# Patient Record
Sex: MALE | Race: WHITE | NOT HISPANIC OR LATINO | Employment: FULL TIME | URBAN - NONMETROPOLITAN AREA
[De-identification: names, ages, dates, MRNs, and addresses within clinical notes are randomized per-mention and may not be internally consistent; named-entity substitution may affect disease eponyms.]

---

## 2023-02-26 ENCOUNTER — OFFICE VISIT (OUTPATIENT)
Dept: URGENT CARE | Facility: PHYSICIAN GROUP | Age: 48
End: 2023-02-26
Payer: COMMERCIAL

## 2023-02-26 VITALS
OXYGEN SATURATION: 98 % | TEMPERATURE: 98.3 F | RESPIRATION RATE: 16 BRPM | SYSTOLIC BLOOD PRESSURE: 124 MMHG | WEIGHT: 271 LBS | HEART RATE: 114 BPM | DIASTOLIC BLOOD PRESSURE: 76 MMHG

## 2023-02-26 DIAGNOSIS — J44.1 COPD WITH ACUTE EXACERBATION (HCC): ICD-10-CM

## 2023-02-26 PROCEDURE — 99204 OFFICE O/P NEW MOD 45 MIN: CPT | Performed by: PHYSICIAN ASSISTANT

## 2023-02-26 RX ORDER — BENZONATATE 200 MG/1
200 CAPSULE ORAL 3 TIMES DAILY PRN
Qty: 30 CAPSULE | Refills: 0 | Status: SHIPPED | OUTPATIENT
Start: 2023-02-26 | End: 2023-07-17

## 2023-02-26 RX ORDER — ALBUTEROL SULFATE 90 UG/1
AEROSOL, METERED RESPIRATORY (INHALATION)
COMMUNITY
Start: 2021-08-22 | End: 2023-07-17 | Stop reason: SDUPTHER

## 2023-02-26 RX ORDER — PREDNISONE 5 MG/1
TABLET ORAL
COMMUNITY
Start: 2021-12-10 | End: 2023-02-26

## 2023-02-26 RX ORDER — ALBUTEROL SULFATE 2.5 MG/3ML
2.5 SOLUTION RESPIRATORY (INHALATION)
COMMUNITY
Start: 2022-07-11 | End: 2024-03-04

## 2023-02-26 RX ORDER — FLUTICASONE PROPIONATE AND SALMETEROL 500; 50 UG/1; UG/1
1 POWDER RESPIRATORY (INHALATION)
COMMUNITY
Start: 2021-06-09 | End: 2023-02-26

## 2023-02-26 RX ORDER — KETOCONAZOLE 20 MG/ML
SHAMPOO TOPICAL
COMMUNITY
Start: 2022-04-11 | End: 2023-02-26

## 2023-02-26 RX ORDER — DOXYCYCLINE HYCLATE 100 MG
100 TABLET ORAL 2 TIMES DAILY
Qty: 14 TABLET | Refills: 0 | Status: SHIPPED | OUTPATIENT
Start: 2023-02-26 | End: 2023-03-05

## 2023-02-26 RX ORDER — TIOTROPIUM BROMIDE INHALATION SPRAY 3.12 UG/1
2 SPRAY, METERED RESPIRATORY (INHALATION) DAILY
COMMUNITY
Start: 2021-04-23 | End: 2023-02-26

## 2023-02-26 RX ORDER — METHYLPREDNISOLONE 4 MG/1
TABLET ORAL
Qty: 21 TABLET | Refills: 0 | Status: SHIPPED | OUTPATIENT
Start: 2023-02-26 | End: 2023-07-17

## 2023-02-26 RX ORDER — MONTELUKAST SODIUM 10 MG/1
1 TABLET ORAL EVERY EVENING
COMMUNITY
Start: 2021-06-11 | End: 2023-02-26

## 2023-02-26 RX ORDER — FLUCONAZOLE 200 MG/1
TABLET ORAL
COMMUNITY
Start: 2022-04-11 | End: 2023-02-26

## 2023-02-26 ASSESSMENT — ENCOUNTER SYMPTOMS
MYALGIAS: 0
VOMITING: 0
COUGH: 1
CHILLS: 0
HEADACHES: 0
HEMOPTYSIS: 0
DIARRHEA: 0
RHINORRHEA: 1
FEVER: 0
WHEEZING: 1
SORE THROAT: 0
ABDOMINAL PAIN: 0
SWEATS: 0
SPUTUM PRODUCTION: 1
NAUSEA: 0
SHORTNESS OF BREATH: 1

## 2023-02-26 ASSESSMENT — COPD QUESTIONNAIRES: COPD: 1

## 2023-02-27 NOTE — PROGRESS NOTES
Subjective     Luis Carlos Singh is a 47 y.o. male who presents with Congestion and Cough (X1week )            Cough  This is a new problem. Episode onset: 1 week. The problem has been gradually worsening. The problem occurs constantly. The cough is Productive of purulent sputum. Associated symptoms include nasal congestion, rhinorrhea, shortness of breath and wheezing. Pertinent negatives include no chest pain, chills, ear congestion, ear pain, fever, headaches, hemoptysis, myalgias, postnasal drip, rash, sore throat or sweats. The symptoms are aggravated by lying down. He has tried OTC cough suppressant for the symptoms. The treatment provided no relief. His past medical history is significant for COPD.       No past medical history on file.      No past surgical history on file.      No family history on file.    Allergies:  Penicillins      Medications, Allergies, and current problem list reviewed today in Epic    Review of Systems   Constitutional:  Positive for malaise/fatigue. Negative for chills and fever.   HENT:  Positive for congestion and rhinorrhea. Negative for ear discharge, ear pain, postnasal drip and sore throat.    Respiratory:  Positive for cough, sputum production, shortness of breath and wheezing. Negative for hemoptysis.    Cardiovascular:  Negative for chest pain.   Gastrointestinal:  Negative for abdominal pain, diarrhea, nausea and vomiting.   Musculoskeletal:  Negative for myalgias.   Skin:  Negative for rash.   Neurological:  Negative for headaches.        All other systems reviewed and are negative.       Objective     /76   Pulse (!) 114   Temp 36.8 °C (98.3 °F) (Temporal)   Resp 16   Wt 123 kg (271 lb)   SpO2 98%      Physical Exam  Constitutional:       General: He is not in acute distress.     Appearance: He is not ill-appearing.   HENT:      Head: Normocephalic and atraumatic.      Nose: Congestion present.      Mouth/Throat:      Mouth: Mucous membranes are moist.       Pharynx: No posterior oropharyngeal erythema.   Eyes:      Conjunctiva/sclera: Conjunctivae normal.   Cardiovascular:      Rate and Rhythm: Regular rhythm. Tachycardia present.      Heart sounds: Normal heart sounds.   Pulmonary:      Effort: Pulmonary effort is normal. No respiratory distress.      Breath sounds: No stridor. Wheezing and rhonchi present. No rales.      Comments: Diffuse mild wheezes and rhonchi  Skin:     General: Skin is warm and dry.   Neurological:      General: No focal deficit present.      Mental Status: He is alert and oriented to person, place, and time.   Psychiatric:         Mood and Affect: Mood normal.         Behavior: Behavior normal.         Thought Content: Thought content normal.         Judgment: Judgment normal.                           Assessment & Plan        1. COPD with acute exacerbation (HCC)    - doxycycline (VIBRAMYCIN) 100 MG Tab; Take 1 Tablet by mouth 2 times a day for 7 days.  Dispense: 14 Tablet; Refill: 0  - methylPREDNISolone (MEDROL DOSEPAK) 4 MG Tablet Therapy Pack; Follow schedule on package instructions.  Dispense: 21 Tablet; Refill: 0  - benzonatate (TESSALON) 200 MG capsule; Take 1 Capsule by mouth 3 times a day as needed for Cough.  Dispense: 30 Capsule; Refill: 0     Differential diagnoses, Supportive care, and indications for immediate follow-up discussed with patient.   Pathogenesis of diagnosis discussed including typical length and natural progression.   Instructed to return to clinic or nearest emergency department for any change in condition, further concerns, or worsening of symptoms.      The patient demonstrated a good understanding and agreed with the treatment plan.    Nickie Sanabria P.A.-C.

## 2023-07-17 ENCOUNTER — OFFICE VISIT (OUTPATIENT)
Dept: MEDICAL GROUP | Facility: CLINIC | Age: 48
End: 2023-07-17
Payer: COMMERCIAL

## 2023-07-17 VITALS
BODY MASS INDEX: 37.94 KG/M2 | OXYGEN SATURATION: 96 % | WEIGHT: 265 LBS | HEART RATE: 85 BPM | RESPIRATION RATE: 20 BRPM | DIASTOLIC BLOOD PRESSURE: 74 MMHG | TEMPERATURE: 98.4 F | SYSTOLIC BLOOD PRESSURE: 108 MMHG | HEIGHT: 70 IN

## 2023-07-17 DIAGNOSIS — R79.89 LOW TESTOSTERONE: ICD-10-CM

## 2023-07-17 DIAGNOSIS — J44.89 ASTHMA-CHRONIC OBSTRUCTIVE PULMONARY DISEASE OVERLAP SYNDROME (HCC): ICD-10-CM

## 2023-07-17 DIAGNOSIS — Z13.220 LIPID SCREENING: ICD-10-CM

## 2023-07-17 DIAGNOSIS — B36.0 TINEA VERSICOLOR: ICD-10-CM

## 2023-07-17 DIAGNOSIS — Z11.59 NEED FOR HEPATITIS C SCREENING TEST: ICD-10-CM

## 2023-07-17 DIAGNOSIS — Z13.29 THYROID DISORDER SCREEN: ICD-10-CM

## 2023-07-17 DIAGNOSIS — Z13.21 ENCOUNTER FOR VITAMIN DEFICIENCY SCREENING: ICD-10-CM

## 2023-07-17 PROBLEM — E23.0 HYPOGONADOTROPIC HYPOGONADISM (HCC): Status: ACTIVE | Noted: 2020-03-05

## 2023-07-17 PROCEDURE — 3074F SYST BP LT 130 MM HG: CPT | Performed by: PHYSICIAN ASSISTANT

## 2023-07-17 PROCEDURE — 99214 OFFICE O/P EST MOD 30 MIN: CPT | Performed by: PHYSICIAN ASSISTANT

## 2023-07-17 PROCEDURE — 3078F DIAST BP <80 MM HG: CPT | Performed by: PHYSICIAN ASSISTANT

## 2023-07-17 RX ORDER — ALBUTEROL SULFATE 90 UG/1
2 AEROSOL, METERED RESPIRATORY (INHALATION) EVERY 4 HOURS PRN
Qty: 18 G | Refills: 2 | Status: SHIPPED | OUTPATIENT
Start: 2023-07-17

## 2023-07-17 RX ORDER — FLUCONAZOLE 200 MG/1
TABLET ORAL
Qty: 2 TABLET | Refills: 0 | Status: SHIPPED | OUTPATIENT
Start: 2023-07-17 | End: 2023-08-16

## 2023-07-17 ASSESSMENT — PATIENT HEALTH QUESTIONNAIRE - PHQ9: CLINICAL INTERPRETATION OF PHQ2 SCORE: 0

## 2023-07-17 NOTE — PROGRESS NOTES
"cc:  rash    Subjective:     Luis Carlos Singh is a 48 y.o. male presenting for rash      Patient presents to the office for rash.    Patient states that this is mostly heat activated for 2 years.  He states that he has been given ketoconazole soap which has never helped.  It is only itchy if he is hot and sweaty.  It will turn pink with heat.      Patient was diagnosed with copd after having covid.  He is currently on albuterol once a day if active.  Less than this if he is active.  Running upstairs, he will need to use his albuterol.      Patient has previously been on testosterone for hypogonadism.  He indicates that he has had difficulty even with medication getting his testosterone levels to be within a normal range.  He was seeing endocrinology at [place] for this.  He is requesting a referral and lab work so that he can discuss using testosterone again.    Review of systems:  See above.   Denies any symptoms unless previously indicated.        Current Outpatient Medications:     fluconazole (DIFLUCAN) 200 MG Tab, Take 400 mg one time., Disp: 2 Tablet, Rfl: 0    albuterol 108 (90 Base) MCG/ACT Aero Soln inhalation aerosol, Inhale 2 Puffs every four hours as needed for Shortness of Breath., Disp: 18 g, Rfl: 2    albuterol (PROVENTIL) 2.5mg/3ml Nebu Soln solution for nebulization, Inhale 2.5 mg., Disp: , Rfl:     gabapentin (NEURONTIN) 300 MG Cap, Take 1 Capsule by mouth 3 times a day. (Patient not taking: Reported on 7/17/2023), Disp: 90 Capsule, Rfl: 0    Allergies, past medical history, past surgical history, family history, social history reviewed and updated    Objective:     Vitals: /74 (BP Location: Left arm, Patient Position: Sitting, BP Cuff Size: Large adult)   Pulse 85   Temp 36.9 °C (98.4 °F) (Temporal)   Resp 20   Ht 1.778 m (5' 10\")   Wt 120 kg (265 lb)   SpO2 96%   BMI 38.02 kg/m²   General: Alert, pleasant, NAD  EYES:   PERRL, EOMI, no icterus or pallor.  Conjunctivae and lids normal. "   HENT:  Normocephalic.  External ears normal..    Respiratory: Normal respiratory effort.    Abdomen: obese  Skin: Warm, dry, tinea versicolor rash upper extremities and trunk  Musculoskeletal: Gait is normal.  Moves all extremities well.    Extremities: Normal range of motion all extremities.   Neurological: No tremors, sensation grossly intact,  CN2-12 intact.  Psych:  Affect/mood is normal, judgement is good, memory is intact, grooming is appropriate.    Assessment/Plan:     Luis Carlos was seen today for establish care and rash.    Diagnoses and all orders for this visit:    Tinea versicolor  -     fluconazole (DIFLUCAN) 200 MG Tab; Take 400 mg one time.    Ketoconazole shampoo has not been effective for patient.  We will try patient on fluconazole 400 mg one-time.  Follow-up in 3 to 4 weeks.    Asthma-chronic obstructive pulmonary disease overlap syndrome (HCC)  -     albuterol 108 (90 Base) MCG/ACT Aero Soln inhalation aerosol; Inhale 2 Puffs every four hours as needed for Shortness of Breath.    Patient is currently taking albuterol.  He has found Spiriva to be less effective.  We will continue with albuterol at this time but may consider an oral with follow-up depending on symptoms.    Low testosterone  -     Testosterone, Free & Total, Adult Male (w/SHBG); Future  -     Referral to Urology  -     PROSTATE SPECIFIC AG SCREENING; Future  -     CBC WITH DIFFERENTIAL; Future    Labs ordered to evaluate further.  Referral submitted.    Lipid screening  -     Comp Metabolic Panel; Future  -     Lipid Profile; Future  Thyroid disorder screen  -     TSH WITH REFLEX TO FT4; Future  -     Comp Metabolic Panel; Future  -     Lipid Profile; Future  Encounter for vitamin deficiency screening  -     VITAMIN D,25 HYDROXY (DEFICIENCY); Future  Need for hepatitis C screening test  -     HEP C VIRUS ANTIBODY; Future      Lab work is been ordered to evaluate further.      Return in about 4 weeks (around 8/14/2023), or if  symptoms worsen or fail to improve, for 3-4 weeks labs and rash follow up.  .    Please note that this dictation was created using voice recognition software. I have made every reasonable attempt to correct obvious errors, but expect that there are errors of grammar and possible content that I did not discover before finalizing note.

## 2023-08-07 ENCOUNTER — HOSPITAL ENCOUNTER (OUTPATIENT)
Dept: LAB | Facility: MEDICAL CENTER | Age: 48
End: 2023-08-07
Attending: PHYSICIAN ASSISTANT
Payer: COMMERCIAL

## 2023-08-07 DIAGNOSIS — Z13.220 LIPID SCREENING: ICD-10-CM

## 2023-08-07 DIAGNOSIS — R79.89 LOW TESTOSTERONE: ICD-10-CM

## 2023-08-07 DIAGNOSIS — Z11.59 NEED FOR HEPATITIS C SCREENING TEST: ICD-10-CM

## 2023-08-07 DIAGNOSIS — Z13.21 ENCOUNTER FOR VITAMIN DEFICIENCY SCREENING: ICD-10-CM

## 2023-08-07 DIAGNOSIS — Z13.29 THYROID DISORDER SCREEN: ICD-10-CM

## 2023-08-07 LAB
25(OH)D3 SERPL-MCNC: 29 NG/ML (ref 30–100)
ALBUMIN SERPL BCP-MCNC: 4.5 G/DL (ref 3.2–4.9)
ALBUMIN/GLOB SERPL: 1.6 G/DL
ALP SERPL-CCNC: 53 U/L (ref 30–99)
ALT SERPL-CCNC: 35 U/L (ref 2–50)
ANION GAP SERPL CALC-SCNC: 10 MMOL/L (ref 7–16)
AST SERPL-CCNC: 22 U/L (ref 12–45)
BASOPHILS # BLD AUTO: 1.2 % (ref 0–1.8)
BASOPHILS # BLD: 0.08 K/UL (ref 0–0.12)
BILIRUB SERPL-MCNC: 0.6 MG/DL (ref 0.1–1.5)
BUN SERPL-MCNC: 15 MG/DL (ref 8–22)
CALCIUM ALBUM COR SERPL-MCNC: 9.2 MG/DL (ref 8.5–10.5)
CALCIUM SERPL-MCNC: 9.6 MG/DL (ref 8.5–10.5)
CHLORIDE SERPL-SCNC: 102 MMOL/L (ref 96–112)
CHOLEST SERPL-MCNC: 160 MG/DL (ref 100–199)
CO2 SERPL-SCNC: 26 MMOL/L (ref 20–33)
CREAT SERPL-MCNC: 0.87 MG/DL (ref 0.5–1.4)
EOSINOPHIL # BLD AUTO: 0.25 K/UL (ref 0–0.51)
EOSINOPHIL NFR BLD: 3.7 % (ref 0–6.9)
ERYTHROCYTE [DISTWIDTH] IN BLOOD BY AUTOMATED COUNT: 39.5 FL (ref 35.9–50)
FASTING STATUS PATIENT QL REPORTED: NORMAL
GFR SERPLBLD CREATININE-BSD FMLA CKD-EPI: 105 ML/MIN/1.73 M 2
GLOBULIN SER CALC-MCNC: 2.8 G/DL (ref 1.9–3.5)
GLUCOSE SERPL-MCNC: 108 MG/DL (ref 65–99)
HCT VFR BLD AUTO: 44.1 % (ref 42–52)
HCV AB SER QL: NORMAL
HDLC SERPL-MCNC: 32 MG/DL
HGB BLD-MCNC: 14.8 G/DL (ref 14–18)
IMM GRANULOCYTES # BLD AUTO: 0.08 K/UL (ref 0–0.11)
IMM GRANULOCYTES NFR BLD AUTO: 1.2 % (ref 0–0.9)
LDLC SERPL CALC-MCNC: 98 MG/DL
LYMPHOCYTES # BLD AUTO: 2.74 K/UL (ref 1–4.8)
LYMPHOCYTES NFR BLD: 40.1 % (ref 22–41)
MCH RBC QN AUTO: 29.7 PG (ref 27–33)
MCHC RBC AUTO-ENTMCNC: 33.6 G/DL (ref 32.3–36.5)
MCV RBC AUTO: 88.4 FL (ref 81.4–97.8)
MONOCYTES # BLD AUTO: 0.65 K/UL (ref 0–0.85)
MONOCYTES NFR BLD AUTO: 9.5 % (ref 0–13.4)
NEUTROPHILS # BLD AUTO: 3.04 K/UL (ref 1.82–7.42)
NEUTROPHILS NFR BLD: 44.3 % (ref 44–72)
NRBC # BLD AUTO: 0 K/UL
NRBC BLD-RTO: 0 /100 WBC (ref 0–0.2)
PLATELET # BLD AUTO: 255 K/UL (ref 164–446)
PMV BLD AUTO: 10.8 FL (ref 9–12.9)
POTASSIUM SERPL-SCNC: 4.8 MMOL/L (ref 3.6–5.5)
PROT SERPL-MCNC: 7.3 G/DL (ref 6–8.2)
PSA SERPL-MCNC: 1.63 NG/ML (ref 0–4)
RBC # BLD AUTO: 4.99 M/UL (ref 4.7–6.1)
SODIUM SERPL-SCNC: 138 MMOL/L (ref 135–145)
TRIGL SERPL-MCNC: 150 MG/DL (ref 0–149)
TSH SERPL DL<=0.005 MIU/L-ACNC: 0.88 UIU/ML (ref 0.38–5.33)
WBC # BLD AUTO: 6.8 K/UL (ref 4.8–10.8)

## 2023-08-07 PROCEDURE — 84402 ASSAY OF FREE TESTOSTERONE: CPT

## 2023-08-07 PROCEDURE — 84270 ASSAY OF SEX HORMONE GLOBUL: CPT

## 2023-08-07 PROCEDURE — 85025 COMPLETE CBC W/AUTO DIFF WBC: CPT

## 2023-08-07 PROCEDURE — 82306 VITAMIN D 25 HYDROXY: CPT

## 2023-08-07 PROCEDURE — 86803 HEPATITIS C AB TEST: CPT

## 2023-08-07 PROCEDURE — 84153 ASSAY OF PSA TOTAL: CPT

## 2023-08-07 PROCEDURE — 80061 LIPID PANEL: CPT

## 2023-08-07 PROCEDURE — 36415 COLL VENOUS BLD VENIPUNCTURE: CPT

## 2023-08-07 PROCEDURE — 84443 ASSAY THYROID STIM HORMONE: CPT

## 2023-08-07 PROCEDURE — 80053 COMPREHEN METABOLIC PANEL: CPT

## 2023-08-07 PROCEDURE — 84403 ASSAY OF TOTAL TESTOSTERONE: CPT

## 2023-08-10 LAB
SHBG SERPL-SCNC: 9 NMOL/L (ref 19–76)
TESTOST FREE MFR SERPL: 2.8 % (ref 1.6–2.9)
TESTOST FREE SERPL-MCNC: 47 PG/ML (ref 47–244)
TESTOST SERPL-MCNC: 168 NG/DL (ref 300–890)

## 2023-08-16 ENCOUNTER — OFFICE VISIT (OUTPATIENT)
Dept: MEDICAL GROUP | Facility: CLINIC | Age: 48
End: 2023-08-16
Payer: COMMERCIAL

## 2023-08-16 VITALS
HEART RATE: 89 BPM | SYSTOLIC BLOOD PRESSURE: 122 MMHG | TEMPERATURE: 98.1 F | BODY MASS INDEX: 36.31 KG/M2 | DIASTOLIC BLOOD PRESSURE: 78 MMHG | OXYGEN SATURATION: 96 % | WEIGHT: 259.4 LBS | RESPIRATION RATE: 16 BRPM | HEIGHT: 71 IN

## 2023-08-16 DIAGNOSIS — R73.9 HYPERGLYCEMIA: ICD-10-CM

## 2023-08-16 DIAGNOSIS — R73.03 PREDIABETES: ICD-10-CM

## 2023-08-16 DIAGNOSIS — Z65.9 CONCERNED ABOUT HAVING SOCIAL PROBLEM: ICD-10-CM

## 2023-08-16 DIAGNOSIS — R79.89 LOW TESTOSTERONE: ICD-10-CM

## 2023-08-16 DIAGNOSIS — E55.9 VITAMIN D DEFICIENCY: ICD-10-CM

## 2023-08-16 DIAGNOSIS — E78.2 MIXED HYPERLIPIDEMIA: ICD-10-CM

## 2023-08-16 DIAGNOSIS — Z23 NEED FOR VACCINATION: ICD-10-CM

## 2023-08-16 PROCEDURE — 90471 IMMUNIZATION ADMIN: CPT | Performed by: PHYSICIAN ASSISTANT

## 2023-08-16 PROCEDURE — 90472 IMMUNIZATION ADMIN EACH ADD: CPT | Performed by: PHYSICIAN ASSISTANT

## 2023-08-16 PROCEDURE — 3078F DIAST BP <80 MM HG: CPT | Performed by: PHYSICIAN ASSISTANT

## 2023-08-16 PROCEDURE — 99214 OFFICE O/P EST MOD 30 MIN: CPT | Mod: 25 | Performed by: PHYSICIAN ASSISTANT

## 2023-08-16 PROCEDURE — 90632 HEPA VACCINE ADULT IM: CPT | Performed by: PHYSICIAN ASSISTANT

## 2023-08-16 PROCEDURE — 90746 HEPB VACCINE 3 DOSE ADULT IM: CPT | Performed by: PHYSICIAN ASSISTANT

## 2023-08-16 PROCEDURE — 3074F SYST BP LT 130 MM HG: CPT | Performed by: PHYSICIAN ASSISTANT

## 2023-08-16 ASSESSMENT — FIBROSIS 4 INDEX: FIB4 SCORE: 0.73

## 2023-08-16 NOTE — PROGRESS NOTES
"cc:  rash    Subjective:     Luis Carlos Singh is a 48 y.o. male presenting for rash      Patient presents to the office for rash.  Patient states that his rash is gone.      Patient is also here to follow-up with his most recent lab results.  His labs do show a low testosterone level.  He does have an appointment scheduled with urology.  Labs also show mixed hyperlipidemia, prediabetes and low vitamin D.    Patient also presents the office today as he is very concerned about his wife who tried to harm herself yesterday.  She was not willing to come with him to the office today but patient has concerns as to how to best help her.  He is struggling a great deal with how to best help her.    Review of systems:  See above.   Denies any symptoms unless previously indicated.        Current Outpatient Medications:     albuterol 108 (90 Base) MCG/ACT Aero Soln inhalation aerosol, Inhale 2 Puffs every four hours as needed for Shortness of Breath., Disp: 18 g, Rfl: 2    albuterol (PROVENTIL) 2.5mg/3ml Nebu Soln solution for nebulization, Inhale 2.5 mg., Disp: , Rfl:     gabapentin (NEURONTIN) 300 MG Cap, Take 1 Capsule by mouth 3 times a day. (Patient not taking: Reported on 7/17/2023), Disp: 90 Capsule, Rfl: 0    Allergies, past medical history, past surgical history, family history, social history reviewed and updated    Objective:     Vitals: /78 (BP Location: Left arm, Patient Position: Sitting, BP Cuff Size: Large adult)   Pulse 89   Temp 36.7 °C (98.1 °F) (Temporal)   Resp 16   Ht 1.803 m (5' 11\")   Wt 118 kg (259 lb 6.4 oz)   SpO2 96%   BMI 36.18 kg/m²   General: Alert, pleasant, NAD  EYES:   PERRL, EOMI, no icterus or pallor.  Conjunctivae and lids normal.   HENT:  Normocephalic.  External ears normal.  Neck supple.   Respiratory: Normal respiratory effort.    Abdomen: obese  Skin: Warm, dry, no rashes.  Musculoskeletal: Gait is normal.  Moves all extremities well.    Extremities: normal range of motion " all extremities.   Neurological: No tremors, sensation grossly intact,  CN2-12 intact.  Psych:  Affect/mood is normal, judgement is good, memory is intact, grooming is appropriate.     Latest Reference Range & Units 08/07/23 06:25   WBC 4.8 - 10.8 K/uL 6.8   RBC 4.70 - 6.10 M/uL 4.99   Hemoglobin 14.0 - 18.0 g/dL 14.8   Hematocrit 42.0 - 52.0 % 44.1   MCV 81.4 - 97.8 fL 88.4   MCH 27.0 - 33.0 pg 29.7   MCHC 32.3 - 36.5 g/dL 33.6   RDW 35.9 - 50.0 fL 39.5   Platelet Count 164 - 446 K/uL 255   MPV 9.0 - 12.9 fL 10.8   Neutrophils-Polys 44.00 - 72.00 % 44.30   Neutrophils (Absolute) 1.82 - 7.42 K/uL 3.04   Lymphocytes 22.00 - 41.00 % 40.10   Lymphs (Absolute) 1.00 - 4.80 K/uL 2.74   Monocytes 0.00 - 13.40 % 9.50   Monos (Absolute) 0.00 - 0.85 K/uL 0.65   Eosinophils 0.00 - 6.90 % 3.70   Eos (Absolute) 0.00 - 0.51 K/uL 0.25   Basophils 0.00 - 1.80 % 1.20   Baso (Absolute) 0.00 - 0.12 K/uL 0.08   Immature Granulocytes 0.00 - 0.90 % 1.20 (H)   Immature Granulocytes (abs) 0.00 - 0.11 K/uL 0.08   Nucleated RBC 0.00 - 0.20 /100 WBC 0.00   NRBC (Absolute) K/uL 0.00   Sodium 135 - 145 mmol/L 138   Potassium 3.6 - 5.5 mmol/L 4.8   Chloride 96 - 112 mmol/L 102   Co2 20 - 33 mmol/L 26   Anion Gap 7.0 - 16.0  10.0   Glucose 65 - 99 mg/dL 108 (H)   Bun 8 - 22 mg/dL 15   Creatinine 0.50 - 1.40 mg/dL 0.87   GFR (CKD-EPI) >60 mL/min/1.73 m 2 105   Calcium 8.5 - 10.5 mg/dL 9.6   Correct Calcium 8.5 - 10.5 mg/dL 9.2   AST(SGOT) 12 - 45 U/L 22   ALT(SGPT) 2 - 50 U/L 35   Alkaline Phosphatase 30 - 99 U/L 53   Total Bilirubin 0.1 - 1.5 mg/dL 0.6   Albumin 3.2 - 4.9 g/dL 4.5   Total Protein 6.0 - 8.2 g/dL 7.3   Globulin 1.9 - 3.5 g/dL 2.8   A-G Ratio g/dL 1.6   Fasting Status  Fasting   Cholesterol,Tot 100 - 199 mg/dL 160   Triglycerides 0 - 149 mg/dL 150 (H)   HDL >=40 mg/dL 32 !   LDL <100 mg/dL 98   25-Hydroxy   Vitamin D 25 30 - 100 ng/mL 29 (L)   Prostatic Specific Antigen Tot 0.00 - 4.00 ng/mL 1.63   TSH 0.380 - 5.330 uIU/mL 0.880    Hepatitis C Antibody Non-Reactive  Non-Reactive   Free Testosterone 47 - 244 pg/mL 47   Sex Hormone Bind Globulin 19 - 76 nmol/L 9 (L)   Testosterone % Free 1.6 - 2.9 % 2.8   Testosterone,Total 300 - 890 ng/dL 168 (L)   (H): Data is abnormally high  !: Data is abnormal  (L): Data is abnormally low    Assessment/Plan:     Luis Carlos was seen today for rash and lab results.    Diagnoses and all orders for this visit:    Low testosterone  -     Testosterone, Free & Total, Adult Male (w/SHBG); Future    Urology referral has been submitted.  Patient has an appointment.    Mixed hyperlipidemia  Prediabetes  Hyperglycemia  Vitamin D deficiency    Labs discussed with patient.  Follow-up in approximately 9 months for repeat labs.    Concerned about having social problem    This is a 30-minute appointment with greater than 50% spent in education counseling and coordination of care.  Patient is struggling significantly as his wife has tried to harm herself and he is not sure what to do.  Lengthy discussion about options.  Ultimately I do believe that his wife is in need of significant help and I have instructed patient that the best option is to try and get her to the ER where they can do a legal hold if needed so that she will receive the help that she needs.  Patient will let me know how this is proceeding.    Need for vaccination  -     Hepatitis B Vaccine Adult IM  -     Hepatitis A Vaccine Adult IM    Vaccines given.    No follow-ups on file.    Please note that this dictation was created using voice recognition software. I have made every reasonable attempt to correct obvious errors, but expect that there are errors of grammar and possible content that I did not discover before finalizing note.

## 2023-09-25 ENCOUNTER — NON-PROVIDER VISIT (OUTPATIENT)
Dept: MEDICAL GROUP | Facility: CLINIC | Age: 48
End: 2023-09-25
Payer: COMMERCIAL

## 2023-09-25 DIAGNOSIS — Z23 NEED FOR VACCINATION: ICD-10-CM

## 2023-09-25 PROCEDURE — 90746 HEPB VACCINE 3 DOSE ADULT IM: CPT | Performed by: PHYSICIAN ASSISTANT

## 2023-09-25 PROCEDURE — 90471 IMMUNIZATION ADMIN: CPT | Performed by: PHYSICIAN ASSISTANT

## 2023-09-25 NOTE — PROGRESS NOTES
"Luis Carlos Singh is a 48 y.o. male here for a non-provider visit for:   HEPATITIS B 2 of 3    Reason for immunization: continue or complete series started at the office  Immunization records indicate need for vaccine: Yes, confirmed with Epic  Minimum interval has been met for this vaccine: Yes  ABN completed: Not Indicated    VIS Dated  5/12/23 was given to patient: Yes  All IAC Questionnaire questions were answered \"No.\"    Patient tolerated injection and no adverse effects were observed or reported: Yes    Pt scheduled for next dose in series: Yes    "

## 2023-09-28 DIAGNOSIS — G56.21 CUBITAL TUNNEL SYNDROME ON RIGHT: ICD-10-CM

## 2023-09-28 RX ORDER — GABAPENTIN 300 MG/1
300 CAPSULE ORAL 3 TIMES DAILY
Qty: 90 CAPSULE | Refills: 1 | Status: SHIPPED | OUTPATIENT
Start: 2023-09-28 | End: 2023-11-28

## 2023-09-28 NOTE — TELEPHONE ENCOUNTER
Received request via: Patient    Was the patient seen in the last year in this department? Yes    Does the patient have an active prescription (recently filled or refills available) for medication(s) requested? No    Does the patient have detention Plus and need 100 day supply (blood pressure, diabetes and cholesterol meds only)? Patient does not have SCP      Last Ov 8/16/23  Last Labs 8/7/23    Medication marked not taking and was denied yesterday by [facility]

## 2023-10-21 ENCOUNTER — HOSPITAL ENCOUNTER (OUTPATIENT)
Dept: LAB | Facility: MEDICAL CENTER | Age: 48
End: 2023-10-21
Attending: STUDENT IN AN ORGANIZED HEALTH CARE EDUCATION/TRAINING PROGRAM
Payer: COMMERCIAL

## 2023-10-21 LAB
25(OH)D3 SERPL-MCNC: 30 NG/ML (ref 30–100)
ESTRADIOL SERPL-MCNC: 31.6 PG/ML
HCT VFR BLD AUTO: 47.7 % (ref 42–52)
HGB BLD-MCNC: 15.6 G/DL (ref 14–18)
TESTOST SERPL-MCNC: 643 NG/DL (ref 175–781)

## 2023-10-21 PROCEDURE — 82670 ASSAY OF TOTAL ESTRADIOL: CPT

## 2023-10-21 PROCEDURE — 84403 ASSAY OF TOTAL TESTOSTERONE: CPT

## 2023-10-21 PROCEDURE — 36415 COLL VENOUS BLD VENIPUNCTURE: CPT

## 2023-10-21 PROCEDURE — 85014 HEMATOCRIT: CPT

## 2023-10-21 PROCEDURE — 85018 HEMOGLOBIN: CPT

## 2023-10-21 PROCEDURE — 82306 VITAMIN D 25 HYDROXY: CPT

## 2023-11-26 DIAGNOSIS — G56.21 CUBITAL TUNNEL SYNDROME ON RIGHT: ICD-10-CM

## 2023-11-27 NOTE — TELEPHONE ENCOUNTER
Was the patient seen in the last year in this department? Yes    Does patient have an active prescription for medications requested? Yes    Received Request Via: Pharmacy    Hospital Outpatient Visit on 10/21/2023   Component Date Value    Hemoglobin 10/21/2023 15.6     Hematocrit 10/21/2023 47.7     Testosterone,Total 10/21/2023 643     Estradiol-E2 10/21/2023 31.6     25-Hydroxy   Vitamin D 25 10/21/2023 30    Hospital Outpatient Visit on 08/07/2023   Component Date Value    WBC 08/07/2023 6.8     RBC 08/07/2023 4.99     Hemoglobin 08/07/2023 14.8     Hematocrit 08/07/2023 44.1     MCV 08/07/2023 88.4     MCH 08/07/2023 29.7     MCHC 08/07/2023 33.6     RDW 08/07/2023 39.5     Platelet Count 08/07/2023 255     MPV 08/07/2023 10.8     Neutrophils-Polys 08/07/2023 44.30     Lymphocytes 08/07/2023 40.10     Monocytes 08/07/2023 9.50     Eosinophils 08/07/2023 3.70     Basophils 08/07/2023 1.20     Immature Granulocytes 08/07/2023 1.20 (H)     Nucleated RBC 08/07/2023 0.00     Neutrophils (Absolute) 08/07/2023 3.04     Lymphs (Absolute) 08/07/2023 2.74     Monos (Absolute) 08/07/2023 0.65     Eos (Absolute) 08/07/2023 0.25     Baso (Absolute) 08/07/2023 0.08     Immature Granulocytes (a* 08/07/2023 0.08     NRBC (Absolute) 08/07/2023 0.00     Prostatic Specific Antig* 08/07/2023 1.63     Testosterone,Total 08/07/2023 168 (L)     Sex Hormone Bind Globulin 08/07/2023 9 (L)     Free Testosterone 08/07/2023 47     Testosterone % Free 08/07/2023 2.8     Cholesterol,Tot 08/07/2023 160     Triglycerides 08/07/2023 150 (H)     HDL 08/07/2023 32 (A)     LDL 08/07/2023 98     Sodium 08/07/2023 138     Potassium 08/07/2023 4.8     Chloride 08/07/2023 102     Co2 08/07/2023 26     Anion Gap 08/07/2023 10.0     Glucose 08/07/2023 108 (H)     Bun 08/07/2023 15     Creatinine 08/07/2023 0.87     Calcium 08/07/2023 9.6     Correct Calcium 08/07/2023 9.2     AST(SGOT) 08/07/2023 22     ALT(SGPT) 08/07/2023 35     Alkaline Phosphatase  08/07/2023 53     Total Bilirubin 08/07/2023 0.6     Albumin 08/07/2023 4.5     Total Protein 08/07/2023 7.3     Globulin 08/07/2023 2.8     A-G Ratio 08/07/2023 1.6     25-Hydroxy   Vitamin D 25 08/07/2023 29 (L)     TSH 08/07/2023 0.880     Hepatitis C Antibody 08/07/2023 Non-Reactive     Fasting Status 08/07/2023 Fasting     GFR (CKD-EPI) 08/07/2023 105    ]

## 2023-11-28 RX ORDER — GABAPENTIN 300 MG/1
300 CAPSULE ORAL 3 TIMES DAILY
Qty: 90 CAPSULE | Refills: 2 | Status: SHIPPED | OUTPATIENT
Start: 2023-11-28 | End: 2024-01-04

## 2024-01-04 ENCOUNTER — OFFICE VISIT (OUTPATIENT)
Dept: MEDICAL GROUP | Facility: CLINIC | Age: 49
End: 2024-01-04
Payer: COMMERCIAL

## 2024-01-04 VITALS
RESPIRATION RATE: 20 BRPM | SYSTOLIC BLOOD PRESSURE: 118 MMHG | OXYGEN SATURATION: 95 % | DIASTOLIC BLOOD PRESSURE: 68 MMHG | HEIGHT: 71 IN | HEART RATE: 87 BPM | WEIGHT: 277.2 LBS | BODY MASS INDEX: 38.81 KG/M2 | TEMPERATURE: 98.5 F

## 2024-01-04 DIAGNOSIS — M48.062 SPINAL STENOSIS OF LUMBAR REGION WITH NEUROGENIC CLAUDICATION: ICD-10-CM

## 2024-01-04 DIAGNOSIS — E23.0 HYPOGONADOTROPIC HYPOGONADISM (HCC): ICD-10-CM

## 2024-01-04 DIAGNOSIS — Z23 NEED FOR VACCINATION: ICD-10-CM

## 2024-01-04 PROCEDURE — 90471 IMMUNIZATION ADMIN: CPT | Performed by: PHYSICIAN ASSISTANT

## 2024-01-04 PROCEDURE — 90686 IIV4 VACC NO PRSV 0.5 ML IM: CPT | Performed by: PHYSICIAN ASSISTANT

## 2024-01-04 PROCEDURE — 99213 OFFICE O/P EST LOW 20 MIN: CPT | Mod: 25 | Performed by: PHYSICIAN ASSISTANT

## 2024-01-04 PROCEDURE — 3074F SYST BP LT 130 MM HG: CPT | Performed by: PHYSICIAN ASSISTANT

## 2024-01-04 PROCEDURE — 3078F DIAST BP <80 MM HG: CPT | Performed by: PHYSICIAN ASSISTANT

## 2024-01-04 RX ORDER — GABAPENTIN 400 MG/1
400 CAPSULE ORAL 3 TIMES DAILY
Qty: 270 CAPSULE | Refills: 1 | Status: SHIPPED | OUTPATIENT
Start: 2024-01-04 | End: 2024-01-29 | Stop reason: SDUPTHER

## 2024-01-04 RX ORDER — SYRINGE W-NEEDLE,DISPOSAB,3 ML 25GX5/8"
SYRINGE, EMPTY DISPOSABLE MISCELLANEOUS
COMMUNITY
Start: 2023-11-10 | End: 2024-03-04

## 2024-01-04 RX ORDER — TESTOSTERONE CYPIONATE 200 MG/ML
140 INJECTION, SOLUTION INTRAMUSCULAR
COMMUNITY

## 2024-01-04 ASSESSMENT — FIBROSIS 4 INDEX: FIB4 SCORE: 0.73

## 2024-01-04 ASSESSMENT — PATIENT HEALTH QUESTIONNAIRE - PHQ9: CLINICAL INTERPRETATION OF PHQ2 SCORE: 0

## 2024-01-05 NOTE — PROGRESS NOTES
"cc: Spinal stenosis    Subjective:     Luis Carlos Singh is a 48 y.o. male presenting for spinal stenosis    Patient presents to the office for spinal stenosis.  Patient states that he was to have a 3rd back surgery and has not had a chance to have this scheduled.  He is having increased numbness contributing to urinary incontinence.  He is having increased burning and pain.  He states that the concern is at l2 l3.  He has had epidurals.  They worked for short periods.  Patient is requesting referral for surgeon and understands that he will need an MRI for this.    Patient is requesting an 18-gauge 1-1/2 inch needle to draw up testosterone.  He indicates that with a 22-gauge, it is difficult to drop the thick fluid.  In the past he has had an 18-gauge needle to draw up the testosterone.    Patient is due for flu vaccine today.    Review of systems:  See above.   Denies any symptoms unless previously indicated.        Current Outpatient Medications:     testosterone cypionate (DEPO-TESTOSTERONE) 200 MG/ML injection, , Disp: , Rfl:     SylantroPOINT SAFETY SYRINGE 22G X 1-1/2\" 3 ML Misc, USE AS DIRECTED TO INJECT DEPOTESTOSTERONE, Disp: , Rfl:     NEEDLE, DISP, 18 G 18G X 1-1/2\" Misc, Use one needle weekly for testosterone, Disp: 12 Each, Rfl: 3    gabapentin (NEURONTIN) 400 MG Cap, Take 1 Capsule by mouth 3 times a day., Disp: 270 Capsule, Rfl: 1    albuterol 108 (90 Base) MCG/ACT Aero Soln inhalation aerosol, Inhale 2 Puffs every four hours as needed for Shortness of Breath., Disp: 18 g, Rfl: 2    albuterol (PROVENTIL) 2.5mg/3ml Nebu Soln solution for nebulization, Inhale 2.5 mg., Disp: , Rfl:     Allergies, past medical history, past surgical history, family history, social history reviewed and updated    Objective:     Vitals: /68 (BP Location: Left arm, Patient Position: Sitting, BP Cuff Size: Adult)   Pulse 87   Temp 36.9 °C (98.5 °F) (Temporal)   Resp 20   Ht 1.803 m (5' 11\")   Wt (!) 126 kg (277 lb 3.2 " "oz)   SpO2 95%   BMI 38.66 kg/m²   General: Alert, pleasant, NAD  EYES:   PERRL, EOMI, no icterus or pallor.  Conjunctivae and lids normal.   HENT:  Normocephalic.  External ears normal.   Neck supple.     Respiratory: Normal respiratory effort.   Abdomen: obese  Skin: Warm, dry, no rashes.  Musculoskeletal: Gait is normal.  Moves all extremities well.    Extremities: normal range of motion all extremities.   Neurological: No tremors, sensation grossly intact, CN2-12 intact.  Psych:  Affect/mood is normal, judgement is good, memory is intact, grooming is appropriate.    Assessment/Plan:     Luis Carlos was seen today for other and medication refill.    Diagnoses and all orders for this visit:    Spinal stenosis of lumbar region with neurogenic claudication  -     Referral to Neurosurgery  -     MR-LUMBAR SPINE-W/O; Future  -     gabapentin (NEURONTIN) 400 MG Cap; Take 1 Capsule by mouth 3 times a day.    Patient has a significant history of spinal surgery and back issues.  Will obtain an MRI to evaluate further, increase the gabapentin from 300 to 400 mg 3 times a day as it is helpful.  However his symptoms are worsening, increasing the dose I believe would be of benefit to the patient.  He will let me know if he is not hearing regarding the referrals in the next week.    Hypogonadotropic hypogonadism (HCC)  -     NEEDLE, DISP, 18 G 18G X 1-1/2\" Misc; Use one needle weekly for testosterone    Prescription submitted for 18-gauge 1-1/2 inch needle to draw up testosterone.    Need for vaccination  -     INFLUENZA VACCINE QUAD INJ (PF)      Flu vaccine given today.      No follow-ups on file.    Please note that this dictation was created using voice recognition software. I have made every reasonable attempt to correct obvious errors, but expect that there are errors of grammar and possible content that I did not discover before finalizing note.   "

## 2024-01-11 ENCOUNTER — HOSPITAL ENCOUNTER (OUTPATIENT)
Dept: RADIOLOGY | Facility: MEDICAL CENTER | Age: 49
End: 2024-01-11
Attending: PHYSICIAN ASSISTANT
Payer: COMMERCIAL

## 2024-01-11 DIAGNOSIS — M48.062 SPINAL STENOSIS OF LUMBAR REGION WITH NEUROGENIC CLAUDICATION: ICD-10-CM

## 2024-01-11 PROCEDURE — 72148 MRI LUMBAR SPINE W/O DYE: CPT

## 2024-01-27 ENCOUNTER — HOSPITAL ENCOUNTER (OUTPATIENT)
Dept: LAB | Facility: MEDICAL CENTER | Age: 49
End: 2024-01-27
Attending: PHYSICIAN ASSISTANT
Payer: COMMERCIAL

## 2024-01-27 ENCOUNTER — HOSPITAL ENCOUNTER (OUTPATIENT)
Dept: LAB | Facility: MEDICAL CENTER | Age: 49
End: 2024-01-27
Attending: STUDENT IN AN ORGANIZED HEALTH CARE EDUCATION/TRAINING PROGRAM
Payer: COMMERCIAL

## 2024-01-27 DIAGNOSIS — R79.89 LOW TESTOSTERONE: ICD-10-CM

## 2024-01-27 LAB
25(OH)D3 SERPL-MCNC: 28 NG/ML (ref 30–100)
ESTRADIOL SERPL-MCNC: 100 PG/ML
HCT VFR BLD AUTO: 53.4 % (ref 42–52)
HGB BLD-MCNC: 17.5 G/DL (ref 14–18)
TESTOST SERPL-MCNC: 1188 NG/DL (ref 175–781)

## 2024-01-27 PROCEDURE — 82306 VITAMIN D 25 HYDROXY: CPT

## 2024-01-27 PROCEDURE — 84403 ASSAY OF TOTAL TESTOSTERONE: CPT

## 2024-01-27 PROCEDURE — 82670 ASSAY OF TOTAL ESTRADIOL: CPT

## 2024-01-27 PROCEDURE — 84270 ASSAY OF SEX HORMONE GLOBUL: CPT

## 2024-01-27 PROCEDURE — 85014 HEMATOCRIT: CPT

## 2024-01-27 PROCEDURE — 85018 HEMOGLOBIN: CPT

## 2024-01-27 PROCEDURE — 84402 ASSAY OF FREE TESTOSTERONE: CPT

## 2024-01-27 PROCEDURE — 36415 COLL VENOUS BLD VENIPUNCTURE: CPT

## 2024-01-29 DIAGNOSIS — M48.062 SPINAL STENOSIS OF LUMBAR REGION WITH NEUROGENIC CLAUDICATION: ICD-10-CM

## 2024-01-29 LAB
SHBG SERPL-SCNC: 11 NMOL/L (ref 19–76)
TESTOST FREE MFR SERPL: 3 % (ref 1.6–2.9)
TESTOST FREE SERPL-MCNC: 351 PG/ML (ref 47–244)
TESTOST SERPL-MCNC: 1154 NG/DL (ref 300–890)

## 2024-01-29 RX ORDER — GABAPENTIN 400 MG/1
400 CAPSULE ORAL 3 TIMES DAILY
Qty: 270 CAPSULE | Refills: 1 | Status: SHIPPED | OUTPATIENT
Start: 2024-01-29

## 2024-01-29 NOTE — TELEPHONE ENCOUNTER
Received request via: Pharmacy    Was the patient seen in the last year in this department? Yes    Does the patient have an active prescription (recently filled or refills available) for medication(s) requested? No    Pharmacy Name: [facility]     Does the patient have [facility] Plus and need 100 day supply (blood pressure, diabetes and cholesterol meds only)? Patient does not have SCP

## 2024-01-31 ENCOUNTER — HOSPITAL ENCOUNTER (OUTPATIENT)
Dept: RADIOLOGY | Facility: MEDICAL CENTER | Age: 49
End: 2024-01-31
Attending: NEUROLOGICAL SURGERY
Payer: COMMERCIAL

## 2024-01-31 DIAGNOSIS — M54.16 LUMBAR RADICULOPATHY: ICD-10-CM

## 2024-01-31 PROCEDURE — 72131 CT LUMBAR SPINE W/O DYE: CPT

## 2024-03-04 ENCOUNTER — APPOINTMENT (OUTPATIENT)
Dept: RADIOLOGY | Facility: MEDICAL CENTER | Age: 49
DRG: 454 | End: 2024-03-04
Attending: NEUROLOGICAL SURGERY
Payer: COMMERCIAL

## 2024-03-04 ENCOUNTER — HOSPITAL ENCOUNTER (INPATIENT)
Facility: MEDICAL CENTER | Age: 49
LOS: 4 days | DRG: 454 | End: 2024-03-08
Attending: EMERGENCY MEDICINE | Admitting: STUDENT IN AN ORGANIZED HEALTH CARE EDUCATION/TRAINING PROGRAM
Payer: COMMERCIAL

## 2024-03-04 DIAGNOSIS — M54.16 LUMBAR RADICULOPATHY, ACUTE: ICD-10-CM

## 2024-03-04 DIAGNOSIS — Z98.1 S/P LUMBAR FUSION: ICD-10-CM

## 2024-03-04 DIAGNOSIS — R29.898 WEAKNESS OF LOWER EXTREMITY, UNSPECIFIED LATERALITY: ICD-10-CM

## 2024-03-04 DIAGNOSIS — M54.50 LUMBAR BACK PAIN: ICD-10-CM

## 2024-03-04 PROBLEM — G62.9 NEUROPATHY: Status: ACTIVE | Noted: 2024-03-04

## 2024-03-04 PROBLEM — E66.9 CLASS 2 OBESITY IN ADULT: Status: ACTIVE | Noted: 2024-03-04

## 2024-03-04 PROBLEM — G83.4 CAUDA EQUINA SYNDROME (HCC): Status: ACTIVE | Noted: 2024-03-04

## 2024-03-04 PROBLEM — E66.812 CLASS 2 OBESITY IN ADULT: Status: ACTIVE | Noted: 2024-03-04

## 2024-03-04 LAB
ALBUMIN SERPL BCP-MCNC: 4.3 G/DL (ref 3.2–4.9)
ALBUMIN/GLOB SERPL: 1.4 G/DL
ALP SERPL-CCNC: 49 U/L (ref 30–99)
ALT SERPL-CCNC: 32 U/L (ref 2–50)
ANION GAP SERPL CALC-SCNC: 14 MMOL/L (ref 7–16)
APTT PPP: 28.8 SEC (ref 24.7–36)
AST SERPL-CCNC: 23 U/L (ref 12–45)
BASOPHILS # BLD AUTO: 0.9 % (ref 0–1.8)
BASOPHILS # BLD: 0.1 K/UL (ref 0–0.12)
BILIRUB SERPL-MCNC: 0.3 MG/DL (ref 0.1–1.5)
BUN SERPL-MCNC: 11 MG/DL (ref 8–22)
CALCIUM ALBUM COR SERPL-MCNC: 9.1 MG/DL (ref 8.5–10.5)
CALCIUM SERPL-MCNC: 9.3 MG/DL (ref 8.5–10.5)
CHLORIDE SERPL-SCNC: 101 MMOL/L (ref 96–112)
CO2 SERPL-SCNC: 22 MMOL/L (ref 20–33)
CREAT SERPL-MCNC: 0.8 MG/DL (ref 0.5–1.4)
EOSINOPHIL # BLD AUTO: 0.17 K/UL (ref 0–0.51)
EOSINOPHIL NFR BLD: 1.5 % (ref 0–6.9)
ERYTHROCYTE [DISTWIDTH] IN BLOOD BY AUTOMATED COUNT: 37.1 FL (ref 35.9–50)
GFR SERPLBLD CREATININE-BSD FMLA CKD-EPI: 107 ML/MIN/1.73 M 2
GLOBULIN SER CALC-MCNC: 3.1 G/DL (ref 1.9–3.5)
GLUCOSE SERPL-MCNC: 74 MG/DL (ref 65–99)
HCT VFR BLD AUTO: 53.9 % (ref 42–52)
HGB BLD-MCNC: 18.1 G/DL (ref 14–18)
IMM GRANULOCYTES # BLD AUTO: 0.03 K/UL (ref 0–0.11)
IMM GRANULOCYTES NFR BLD AUTO: 0.3 % (ref 0–0.9)
INR PPP: 1.04 (ref 0.87–1.13)
LYMPHOCYTES # BLD AUTO: 4.63 K/UL (ref 1–4.8)
LYMPHOCYTES NFR BLD: 40.9 % (ref 22–41)
MAGNESIUM SERPL-MCNC: 2.2 MG/DL (ref 1.5–2.5)
MCH RBC QN AUTO: 28.5 PG (ref 27–33)
MCHC RBC AUTO-ENTMCNC: 33.6 G/DL (ref 32.3–36.5)
MCV RBC AUTO: 85 FL (ref 81.4–97.8)
MONOCYTES # BLD AUTO: 1.03 K/UL (ref 0–0.85)
MONOCYTES NFR BLD AUTO: 9.1 % (ref 0–13.4)
NEUTROPHILS # BLD AUTO: 5.36 K/UL (ref 1.82–7.42)
NEUTROPHILS NFR BLD: 47.3 % (ref 44–72)
NRBC # BLD AUTO: 0 K/UL
NRBC BLD-RTO: 0 /100 WBC (ref 0–0.2)
PLATELET # BLD AUTO: 300 K/UL (ref 164–446)
PMV BLD AUTO: 10.6 FL (ref 9–12.9)
POTASSIUM SERPL-SCNC: 4.2 MMOL/L (ref 3.6–5.5)
PROT SERPL-MCNC: 7.4 G/DL (ref 6–8.2)
PROTHROMBIN TIME: 13.7 SEC (ref 12–14.6)
RBC # BLD AUTO: 6.34 M/UL (ref 4.7–6.1)
SODIUM SERPL-SCNC: 137 MMOL/L (ref 135–145)
WBC # BLD AUTO: 11.3 K/UL (ref 4.8–10.8)

## 2024-03-04 PROCEDURE — 36415 COLL VENOUS BLD VENIPUNCTURE: CPT

## 2024-03-04 PROCEDURE — 770001 HCHG ROOM/CARE - MED/SURG/GYN PRIV*

## 2024-03-04 PROCEDURE — 51798 US URINE CAPACITY MEASURE: CPT

## 2024-03-04 PROCEDURE — 99222 1ST HOSP IP/OBS MODERATE 55: CPT | Performed by: STUDENT IN AN ORGANIZED HEALTH CARE EDUCATION/TRAINING PROGRAM

## 2024-03-04 PROCEDURE — 85610 PROTHROMBIN TIME: CPT

## 2024-03-04 PROCEDURE — 700102 HCHG RX REV CODE 250 W/ 637 OVERRIDE(OP): Performed by: NURSE PRACTITIONER

## 2024-03-04 PROCEDURE — 80053 COMPREHEN METABOLIC PANEL: CPT | Mod: 91

## 2024-03-04 PROCEDURE — 85025 COMPLETE CBC W/AUTO DIFF WBC: CPT | Mod: 91

## 2024-03-04 PROCEDURE — 700102 HCHG RX REV CODE 250 W/ 637 OVERRIDE(OP): Performed by: STUDENT IN AN ORGANIZED HEALTH CARE EDUCATION/TRAINING PROGRAM

## 2024-03-04 PROCEDURE — 83735 ASSAY OF MAGNESIUM: CPT | Mod: 91

## 2024-03-04 PROCEDURE — 700111 HCHG RX REV CODE 636 W/ 250 OVERRIDE (IP): Performed by: EMERGENCY MEDICINE

## 2024-03-04 PROCEDURE — 700105 HCHG RX REV CODE 258: Performed by: STUDENT IN AN ORGANIZED HEALTH CARE EDUCATION/TRAINING PROGRAM

## 2024-03-04 PROCEDURE — 72148 MRI LUMBAR SPINE W/O DYE: CPT

## 2024-03-04 PROCEDURE — A9270 NON-COVERED ITEM OR SERVICE: HCPCS | Performed by: STUDENT IN AN ORGANIZED HEALTH CARE EDUCATION/TRAINING PROGRAM

## 2024-03-04 PROCEDURE — A9270 NON-COVERED ITEM OR SERVICE: HCPCS | Performed by: NURSE PRACTITIONER

## 2024-03-04 PROCEDURE — 700111 HCHG RX REV CODE 636 W/ 250 OVERRIDE (IP): Mod: JZ | Performed by: STUDENT IN AN ORGANIZED HEALTH CARE EDUCATION/TRAINING PROGRAM

## 2024-03-04 PROCEDURE — 99285 EMERGENCY DEPT VISIT HI MDM: CPT

## 2024-03-04 PROCEDURE — 85730 THROMBOPLASTIN TIME PARTIAL: CPT

## 2024-03-04 PROCEDURE — 96374 THER/PROPH/DIAG INJ IV PUSH: CPT

## 2024-03-04 PROCEDURE — 84100 ASSAY OF PHOSPHORUS: CPT

## 2024-03-04 RX ORDER — HYDROMORPHONE HYDROCHLORIDE 1 MG/ML
0.5 INJECTION, SOLUTION INTRAMUSCULAR; INTRAVENOUS; SUBCUTANEOUS ONCE
Status: COMPLETED | OUTPATIENT
Start: 2024-03-04 | End: 2024-03-04

## 2024-03-04 RX ORDER — CELECOXIB 200 MG/1
200 CAPSULE ORAL 2 TIMES DAILY PRN
Status: DISCONTINUED | OUTPATIENT
Start: 2024-03-09 | End: 2024-03-05

## 2024-03-04 RX ORDER — LIDOCAINE 4 G/G
1 PATCH TOPICAL EVERY 24 HOURS
Status: DISCONTINUED | OUTPATIENT
Start: 2024-03-04 | End: 2024-03-08 | Stop reason: HOSPADM

## 2024-03-04 RX ORDER — PROMETHAZINE HYDROCHLORIDE 25 MG/1
12.5-25 SUPPOSITORY RECTAL EVERY 4 HOURS PRN
Status: DISCONTINUED | OUTPATIENT
Start: 2024-03-04 | End: 2024-03-05

## 2024-03-04 RX ORDER — ACETAMINOPHEN 325 MG/1
650 TABLET ORAL EVERY 6 HOURS PRN
Status: DISCONTINUED | OUTPATIENT
Start: 2024-03-04 | End: 2024-03-04

## 2024-03-04 RX ORDER — ONDANSETRON 4 MG/1
4 TABLET, ORALLY DISINTEGRATING ORAL EVERY 4 HOURS PRN
Status: DISCONTINUED | OUTPATIENT
Start: 2024-03-04 | End: 2024-03-05

## 2024-03-04 RX ORDER — GABAPENTIN 400 MG/1
400 CAPSULE ORAL 3 TIMES DAILY
Status: DISCONTINUED | OUTPATIENT
Start: 2024-03-04 | End: 2024-03-08 | Stop reason: HOSPADM

## 2024-03-04 RX ORDER — METHOCARBAMOL 750 MG/1
750 TABLET, FILM COATED ORAL 3 TIMES DAILY
Status: DISCONTINUED | OUTPATIENT
Start: 2024-03-04 | End: 2024-03-05

## 2024-03-04 RX ORDER — HYDROMORPHONE HYDROCHLORIDE 1 MG/ML
0.5 INJECTION, SOLUTION INTRAMUSCULAR; INTRAVENOUS; SUBCUTANEOUS
Status: DISCONTINUED | OUTPATIENT
Start: 2024-03-04 | End: 2024-03-06

## 2024-03-04 RX ORDER — ACETAMINOPHEN 325 MG/1
650 TABLET ORAL EVERY 6 HOURS PRN
Status: DISCONTINUED | OUTPATIENT
Start: 2024-03-09 | End: 2024-03-08 | Stop reason: HOSPADM

## 2024-03-04 RX ORDER — ACETAMINOPHEN 325 MG/1
650 TABLET ORAL EVERY 6 HOURS
Status: DISCONTINUED | OUTPATIENT
Start: 2024-03-04 | End: 2024-03-08 | Stop reason: HOSPADM

## 2024-03-04 RX ORDER — SODIUM CHLORIDE 9 MG/ML
INJECTION, SOLUTION INTRAVENOUS CONTINUOUS
Status: DISCONTINUED | OUTPATIENT
Start: 2024-03-04 | End: 2024-03-05

## 2024-03-04 RX ORDER — CELECOXIB 200 MG/1
200 CAPSULE ORAL 2 TIMES DAILY
Status: DISCONTINUED | OUTPATIENT
Start: 2024-03-04 | End: 2024-03-05

## 2024-03-04 RX ORDER — HYDROCODONE BITARTRATE AND ACETAMINOPHEN 5; 325 MG/1; MG/1
2 TABLET ORAL EVERY 6 HOURS PRN
Status: DISCONTINUED | OUTPATIENT
Start: 2024-03-04 | End: 2024-03-06

## 2024-03-04 RX ORDER — DEXAMETHASONE SODIUM PHOSPHATE 4 MG/ML
4 INJECTION, SOLUTION INTRA-ARTICULAR; INTRALESIONAL; INTRAMUSCULAR; INTRAVENOUS; SOFT TISSUE EVERY 6 HOURS
Status: DISPENSED | OUTPATIENT
Start: 2024-03-04 | End: 2024-03-06

## 2024-03-04 RX ORDER — OXYCODONE HYDROCHLORIDE 10 MG/1
10 TABLET ORAL
Status: DISCONTINUED | OUTPATIENT
Start: 2024-03-04 | End: 2024-03-04

## 2024-03-04 RX ORDER — PROCHLORPERAZINE EDISYLATE 5 MG/ML
5-10 INJECTION INTRAMUSCULAR; INTRAVENOUS EVERY 4 HOURS PRN
Status: DISCONTINUED | OUTPATIENT
Start: 2024-03-04 | End: 2024-03-05

## 2024-03-04 RX ORDER — PROMETHAZINE HYDROCHLORIDE 25 MG/1
12.5-25 TABLET ORAL EVERY 4 HOURS PRN
Status: DISCONTINUED | OUTPATIENT
Start: 2024-03-04 | End: 2024-03-05

## 2024-03-04 RX ORDER — OXYCODONE HYDROCHLORIDE 5 MG/1
5 TABLET ORAL
Status: DISCONTINUED | OUTPATIENT
Start: 2024-03-04 | End: 2024-03-04

## 2024-03-04 RX ORDER — AMOXICILLIN 250 MG
2 CAPSULE ORAL EVERY EVENING
Status: DISCONTINUED | OUTPATIENT
Start: 2024-03-04 | End: 2024-03-05

## 2024-03-04 RX ORDER — LABETALOL HYDROCHLORIDE 5 MG/ML
10 INJECTION, SOLUTION INTRAVENOUS EVERY 4 HOURS PRN
Status: DISCONTINUED | OUTPATIENT
Start: 2024-03-04 | End: 2024-03-08 | Stop reason: HOSPADM

## 2024-03-04 RX ORDER — ALBUTEROL SULFATE 90 UG/1
2 AEROSOL, METERED RESPIRATORY (INHALATION) EVERY 4 HOURS PRN
Status: DISCONTINUED | OUTPATIENT
Start: 2024-03-04 | End: 2024-03-08 | Stop reason: HOSPADM

## 2024-03-04 RX ORDER — ONDANSETRON 2 MG/ML
4 INJECTION INTRAMUSCULAR; INTRAVENOUS EVERY 4 HOURS PRN
Status: DISCONTINUED | OUTPATIENT
Start: 2024-03-04 | End: 2024-03-05

## 2024-03-04 RX ORDER — POLYETHYLENE GLYCOL 3350 17 G/17G
1 POWDER, FOR SOLUTION ORAL
Status: DISCONTINUED | OUTPATIENT
Start: 2024-03-04 | End: 2024-03-05

## 2024-03-04 RX ORDER — IPRATROPIUM BROMIDE AND ALBUTEROL SULFATE 2.5; .5 MG/3ML; MG/3ML
3 SOLUTION RESPIRATORY (INHALATION)
Status: DISCONTINUED | OUTPATIENT
Start: 2024-03-04 | End: 2024-03-08 | Stop reason: HOSPADM

## 2024-03-04 RX ORDER — HYDROCODONE BITARTRATE AND ACETAMINOPHEN 5; 325 MG/1; MG/1
1 TABLET ORAL EVERY 6 HOURS PRN
Status: DISCONTINUED | OUTPATIENT
Start: 2024-03-04 | End: 2024-03-06

## 2024-03-04 RX ADMIN — CELECOXIB 200 MG: 200 CAPSULE ORAL at 17:27

## 2024-03-04 RX ADMIN — HYDROCODONE BITARTRATE AND ACETAMINOPHEN 2 TABLET: 5; 325 TABLET ORAL at 21:10

## 2024-03-04 RX ADMIN — HYDROMORPHONE HYDROCHLORIDE 0.5 MG: 1 INJECTION, SOLUTION INTRAMUSCULAR; INTRAVENOUS; SUBCUTANEOUS at 14:58

## 2024-03-04 RX ADMIN — DEXAMETHASONE SODIUM PHOSPHATE 4 MG: 4 INJECTION INTRA-ARTICULAR; INTRALESIONAL; INTRAMUSCULAR; INTRAVENOUS; SOFT TISSUE at 17:28

## 2024-03-04 RX ADMIN — ACETAMINOPHEN 650 MG: 325 TABLET, FILM COATED ORAL at 17:26

## 2024-03-04 RX ADMIN — GABAPENTIN 400 MG: 400 CAPSULE ORAL at 17:27

## 2024-03-04 RX ADMIN — METHOCARBAMOL 750 MG: 750 TABLET ORAL at 17:26

## 2024-03-04 RX ADMIN — OXYCODONE 5 MG: 5 TABLET ORAL at 18:32

## 2024-03-04 RX ADMIN — DOCUSATE SODIUM 50 MG AND SENNOSIDES 8.6 MG 2 TABLET: 8.6; 5 TABLET, FILM COATED ORAL at 17:26

## 2024-03-04 RX ADMIN — SODIUM CHLORIDE: 9 INJECTION, SOLUTION INTRAVENOUS at 17:46

## 2024-03-04 ASSESSMENT — ENCOUNTER SYMPTOMS
COUGH: 0
MYALGIAS: 1
CHILLS: 0
FEVER: 0
VOMITING: 0
DIZZINESS: 0
HEADACHES: 0
DOUBLE VISION: 0
NAUSEA: 0
ABDOMINAL PAIN: 0
HEARTBURN: 0
NECK PAIN: 0
SHORTNESS OF BREATH: 0
BLURRED VISION: 0
BACK PAIN: 1
PALPITATIONS: 0
FOCAL WEAKNESS: 0
WEAKNESS: 1
FALLS: 0
DEPRESSION: 0
BRUISES/BLEEDS EASILY: 0

## 2024-03-04 ASSESSMENT — PATIENT HEALTH QUESTIONNAIRE - PHQ9
2. FEELING DOWN, DEPRESSED, IRRITABLE, OR HOPELESS: NOT AT ALL
SUM OF ALL RESPONSES TO PHQ9 QUESTIONS 1 AND 2: 0
1. LITTLE INTEREST OR PLEASURE IN DOING THINGS: NOT AT ALL

## 2024-03-04 ASSESSMENT — PAIN DESCRIPTION - PAIN TYPE
TYPE: ACUTE PAIN

## 2024-03-04 ASSESSMENT — FIBROSIS 4 INDEX: FIB4 SCORE: 0.73

## 2024-03-04 ASSESSMENT — LIFESTYLE VARIABLES: SUBSTANCE_ABUSE: 0

## 2024-03-04 NOTE — ED NOTES
Med rec updated and complete. Allergies reviewed. Confirmed name and date of birth.   No outpatient antibiotic use in last 30 days.  No anticoagulant or antiplatelet medications.      Home pharmacy    [facility] = [phone]

## 2024-03-04 NOTE — H&P
Hospital Medicine History & Physical Note    Date of Service  3/4/2024    Primary Care Physician  Jackie Espinosa P.A.-C.    Consultants  Neurosurgery (Dr. Lauren)    Code Status  Full Code    Chief Complaint  Chief Complaint   Patient presents with    Low Back Pain     Chronic 5/10 lower back pain. Degenerative disc problem with multiple surgeries to the lumbar and sacral area.  Last night, severe pain started while sleeping with right leg weakness and pain. Urine incontinence started this morning.        History of Presenting Illness  Luis Carlos Singh is a 48 y.o. male with history of asthma, chronic back pain followed by Dr. Lauren who presented 3/4/2024 to ER at the referral neurosurgery PA for evaluation of lumbar radiculopathy, suspected cauda equina syndrome.  He does endorse mild episodic urinary incontinence, denies loss of bowel incontinence.  He was seen by neurosurgery office outpatient earlier today, due to concern of possibly needing invasive surgical intervention, referred to ER for evaluation.  Neurosurgery has been consulted formally by ERP, formal evaluation to follow.  Admitted to medicine service for further evaluation and treatment.    I discussed the plan of care with patient, family, bedside RN, and pharmacy.    Review of Systems  Review of Systems   Constitutional:  Negative for chills and fever.   HENT:  Negative for hearing loss and tinnitus.    Eyes:  Negative for blurred vision and double vision.   Respiratory:  Negative for cough and shortness of breath.    Cardiovascular:  Negative for chest pain and palpitations.   Gastrointestinal:  Negative for abdominal pain, heartburn, nausea and vomiting.   Genitourinary:  Negative for dysuria and urgency.   Musculoskeletal:  Positive for back pain, joint pain and myalgias. Negative for falls and neck pain.   Skin:  Negative for itching and rash.   Neurological:  Positive for weakness. Negative for dizziness, focal weakness and headaches.  "  Endo/Heme/Allergies:  Negative for environmental allergies. Does not bruise/bleed easily.   Psychiatric/Behavioral:  Negative for depression and substance abuse.    All other systems reviewed and are negative.      Past Medical History   has no past medical history on file.    Surgical History   has a past surgical history that includes lumbar fusion anterior and carpal tunnel release (Left).     Family History  family history is not on file.   Family history reviewed with patient. There is no family history that is pertinent to the chief complaint.     Social History   reports that he has never smoked. He has never used smokeless tobacco. He reports current alcohol use. He reports that he does not currently use drugs.    Allergies  Allergies   Allergen Reactions    Penicillins Rash     > 40 years ago       Medications  Prior to Admission Medications   Prescriptions Last Dose Informant Patient Reported? Taking?   NEEDLE, DISP, 18 G 18G X 1-12\" Misc supply at n/a Patient, Family Member No No   Sig: Use one needle weekly for testosterone   albuterol 108 (90 Base) MCG/ACT Aero Soln inhalation aerosol 2024 at 1530 Patient, Family Member No No   Sig: Inhale 2 Puffs every four hours as needed for Shortness of Breath.   gabapentin (NEURONTIN) 400 MG Cap 3/4/2024 at 0800 Patient, Family Member No Yes   Sig: Take 1 Capsule by mouth 3 times a day.   testosterone cypionate (DEPO-TESTOSTERONE) 200 MG/ML injection 2024 at 1000 Patient, Family Member Yes No   Si mg every Wednesday. 0.7 ml = 140 mg      Facility-Administered Medications: None       Physical Exam  Temp:  [36.6 °C (97.8 °F)-36.7 °C (98.1 °F)] 36.7 °C (98.1 °F)  Pulse:  [83-94] 83  Resp:  [14-16] 16  BP: (122-133)/() 127/75  SpO2:  [94 %-95 %] 95 %  Blood Pressure: (!) 133/102   Temperature: 36.6 °C (97.8 °F)   Pulse: 94   Respiration: 14   Pulse Oximetry: 94 %       Physical Exam  Vitals and nursing note reviewed.   Constitutional:       " "General: He is not in acute distress.     Appearance: He is obese.   HENT:      Head: Normocephalic and atraumatic.      Nose: Nose normal.      Mouth/Throat:      Mouth: Mucous membranes are moist.   Eyes:      General: No scleral icterus.     Extraocular Movements: Extraocular movements intact.   Cardiovascular:      Rate and Rhythm: Normal rate and regular rhythm.      Pulses: Normal pulses.      Heart sounds:      No friction rub.   Pulmonary:      Effort: No respiratory distress.      Breath sounds: No wheezing or rales.   Chest:      Chest wall: No tenderness.   Abdominal:      General: There is no distension.      Tenderness: There is no abdominal tenderness. There is no guarding or rebound.   Musculoskeletal:      Cervical back: Neck supple. No tenderness.      Comments: Tender in thoracolumbar region  Positive straight leg raise test on right   Skin:     General: Skin is warm and dry.      Capillary Refill: Capillary refill takes less than 2 seconds.   Neurological:      General: No focal deficit present.      Mental Status: He is alert and oriented to person, place, and time.   Psychiatric:         Mood and Affect: Mood normal.         Laboratory:  Recent Labs     03/04/24  1434   WBC 11.3*   RBC 6.34*   HEMOGLOBIN 18.1*   HEMATOCRIT 53.9*   MCV 85.0   MCH 28.5   MCHC 33.6   RDW 37.1   PLATELETCT 300   MPV 10.6     Recent Labs     03/04/24  1434   SODIUM 137   POTASSIUM 4.2   CHLORIDE 101   CO2 22   GLUCOSE 74   BUN 11   CREATININE 0.80   CALCIUM 9.3     Recent Labs     03/04/24  1434   ALTSGPT 32   ASTSGOT 23   ALKPHOSPHAT 49   TBILIRUBIN 0.3   GLUCOSE 74     Recent Labs     03/04/24  1434   APTT 28.8   INR 1.04     No results for input(s): \"NTPROBNP\" in the last 72 hours.      No results for input(s): \"TROPONINT\" in the last 72 hours.    Imaging:  MR-LUMBAR SPINE-W/O    (Results Pending)       no X-Ray or EKG requiring interpretation    Assessment/Plan:  Justification for Admission Status  I anticipate " this patient will require at least 2 midnights of hospitalization, therefore appropriate for inpatient status.        * Cauda equina syndrome (HCC)  Assessment & Plan  Suspected  MRI lumbar pending    Decadron  Supportive pain control  PT/OT  Neurosurgery consulted, f/u appreciated    Lumbar radiculopathy, acute- (present on admission)  Assessment & Plan  As noted above  RLE>LLE    Neuropathy  Assessment & Plan  Gabapentin    Hypogonadotropic hypogonadism (HCC)- (present on admission)  Assessment & Plan  Per history  Was on testosterone  Outpatient follow-up    Asthma-chronic obstructive pulmonary disease overlap syndrome- (present on admission)  Assessment & Plan  Currently not in acute exacerbation  As needed DuoNebs, albuterol    Class 2 obesity in adult  Assessment & Plan  Diet and lifestyle modification  Body mass index is 38.74 kg/m².          VTE prophylaxis: SCDs/TEDs

## 2024-03-04 NOTE — ED PROVIDER NOTES
ED Provider Note    CHIEF COMPLAINT  Chief Complaint   Patient presents with    Low Back Pain     Chronic 5/10 lower back pain. Degenerative disc problem with multiple surgeries to the lumbar and sacral area.  Last night, severe pain started while sleeping with right leg weakness and pain. Urine incontinence started this morning.        EXTERNAL RECORDS REVIEWED  Outpatient Notes with neurosurgery,   February 14, 2024 with noted junctional stenosis,, patient was subsequently seen by additional provider 2 days and sent to the emergency department with concern for spinal compression syndrome    HPI/ROS  LIMITATION TO HISTORY   Select: : None  OUTSIDE HISTORIAN(S):  none    Luis Carlos Singh is a 48 y.o. male who presents with low back pain.  Patient reports he has had low back pain for many years requiring 2 prior surgeries on his lumbar spine in [facility].  He has been following with Dr. Lauren here in [place] due to some continued issues with his back.  However last night he began having severe back pain that was much worse than his usual pain.  At that point he also reports pain shooting down his right leg as well as some weakness and numbness to the right leg.  He does report he has had some chronic numbness and occasional pain to his left leg.  He also reports difficulty urinating and incontinence beginning last night.  He reports a numbing sensation to the inner part of his groin as well.  He reports no other focal weakness or numbness.  He reports no abdominal pain, nausea vomiting or diarrhea.  He reports no fevers or chills.    He was seen in the neurosurgery office and sent to the emergency department today    PAST MEDICAL HISTORY       SURGICAL HISTORY   has a past surgical history that includes lumbar fusion anterior and carpal tunnel release (Left).    FAMILY HISTORY  No family history on file.    SOCIAL HISTORY  Social History     Tobacco Use    Smoking status: Never    Smokeless tobacco:  "Never   Vaping Use    Vaping Use: Never used   Substance and Sexual Activity    Alcohol use: Yes     Comment: 12 pack beer/mo    Drug use: Not Currently    Sexual activity: Not on file       CURRENT MEDICATIONS  Home Medications       Reviewed by Cherise Ro (Pharmacy Tech) on 03/04/24 at 1443  Med List Status: Complete     Medication Last Dose Status   albuterol 108 (90 Base) MCG/ACT Aero Soln inhalation aerosol 2/29/2024 Active   gabapentin (NEURONTIN) 400 MG Cap 3/4/2024 Active   NEEDLE, DISP, 18 G 18G X 1-1/2\" Misc supply Active   testosterone cypionate (DEPO-TESTOSTERONE) 200 MG/ML injection 2/28/2024 Active                    ALLERGIES  Allergies   Allergen Reactions    Penicillins Rash     > 40 years ago       PHYSICAL EXAM  VITAL SIGNS: /75   Pulse 83   Temp 36.7 °C (98.1 °F) (Temporal)   Resp 16   Ht 1.778 m (5' 10\")   Wt 122 kg (270 lb)   SpO2 95%   BMI 38.74 kg/m²      Pulse ox interpretation: I interpret this pulse ox as normal.  Constitutional: Alert uncomfortable.  HENT: No signs of trauma, Bilateral external ears normal, Nose normal.   Eyes: Pupils are equal and reactive, Conjunctiva normal, Non-icteric.   Neck: Normal range of motion, No tenderness, Supple, No stridor.   Cardiovascular: Regular rate and rhythm, no murmurs.   Thorax & Lungs: Normal breath sounds, No respiratory distress, No wheezing, No chest tenderness.   Abdomen: Bowel sounds normal, Soft, No tenderness, No masses, No pulsatile masses. No peritoneal signs.  Skin: Warm, Dry, No erythema, No rash.   Back: Large midline surgical scar that is well-healed.  Minimal midline tenderness in the lumbar spine, no swelling, no erythema no bony tenderness, No CVA tenderness.   Extremities: Intact distal pulses, No edema, No tenderness, No cyanosis,  Negative Verona's sign.   Musculoskeletal: Good range of motion in all major joints. No tenderness to palpation or major deformities noted.   Neurologic: Alert , on the left the " strength is 5 out of 5 with hip flexion extension, knee flexion extension and dorsiflexion and plantarflexion at the ankle, on the right, strength is 4 out of 5 with flexion and extension of the hip although potential some pain limitations, 5 out of 5 with flexion extension at knee and 4 out of 5 with flexion extension at the ankle    Psychiatric: Affect normal, Judgment normal, Mood normal.               DIAGNOSTIC STUDIES / PROCEDURES  Labs Reviewed   CBC WITH DIFFERENTIAL - Abnormal; Notable for the following components:       Result Value    WBC 11.3 (*)     RBC 6.34 (*)     Hemoglobin 18.1 (*)     Hematocrit 53.9 (*)     Monos (Absolute) 1.03 (*)     All other components within normal limits   COMP METABOLIC PANEL   PROTHROMBIN TIME   APTT   MAGNESIUM   ESTIMATED GFR             COURSE & MEDICAL DECISION MAKING        INITIAL ASSESSMENT, COURSE AND PLAN  Care Narrative: 1:54 PM  Patient is evaluated the bedside and chart is reviewed.  At this point patient does have known degenerative disease in his spine and is having some concerning symptoms for spinal compression syndrome.   will provide analgesia with IV pain medication, contact neurosurgery with apparent operative plan    I discussed the case with Dr. Lauren's PA.  I will plan to admit the patient to the hospitalist, likely or tomorrow.  Did advise to obtain an MRI for Dr. Lauren so this has been ordered as well    Case discussed with Dr. Cardenas for admission       PROBLEM LIST  # Cauda equina symptoms.  Patient with longstanding history of degenerative lumbar disease.  He is developed new neurologic symptoms as well as worsening pain and admitted to the hospital for operative care with Dr. Lauren    # Lumbar back pain.  DISPOSITION AND DISCUSSIONS  I have discussed management of the patient with the following physicians and GIDEON's:  CHRISTIANO Moore on behalf of Dr. Lauren    Patient is admitted in guarded condition    FINAL DIAGNOSIS  1. Lumbar back pain     2. Weakness of lower extremity, unspecified laterality           Electronically signed by: Trevon Pandey M.D., 3/4/2024 1:53 PM

## 2024-03-04 NOTE — ED TRIAGE NOTES
Luis Carlos Singh  48 y.o. male  Chief Complaint   Patient presents with    Low Back Pain     Chronic 5/10 lower back pain. Degenerative disc problem with multiple surgeries to the lumbar and sacral area.  Last night, severe pain started while sleeping with right leg weakness and pain. Urine incontinence started this morning.      Pt on wheelchair to triage for above complaint. Baseline ambulates without assistance.      Pt is GCS 15, speaking in full sentences, follows commands and responds appropriately to questions. Resp are even and unlabored.    Pt placed in ED lobby. Pt educated on triage process. Pt encouraged to alert staff for any changes.       Vitals:    03/04/24 1233   BP: (!) 133/102   Pulse: 94   Resp: 14   Temp: 36.6 °C (97.8 °F)   SpO2: 94%

## 2024-03-05 ENCOUNTER — ANESTHESIA EVENT (OUTPATIENT)
Dept: SURGERY | Facility: MEDICAL CENTER | Age: 49
DRG: 454 | End: 2024-03-05
Payer: COMMERCIAL

## 2024-03-05 ENCOUNTER — ANESTHESIA (OUTPATIENT)
Dept: SURGERY | Facility: MEDICAL CENTER | Age: 49
DRG: 454 | End: 2024-03-05
Payer: COMMERCIAL

## 2024-03-05 ENCOUNTER — APPOINTMENT (OUTPATIENT)
Dept: RADIOLOGY | Facility: MEDICAL CENTER | Age: 49
DRG: 454 | End: 2024-03-05
Attending: NEUROLOGICAL SURGERY
Payer: COMMERCIAL

## 2024-03-05 PROBLEM — E87.5 HYPERKALEMIA: Status: ACTIVE | Noted: 2024-03-05

## 2024-03-05 LAB
ALBUMIN SERPL BCP-MCNC: 4.7 G/DL (ref 3.2–4.9)
ALBUMIN/GLOB SERPL: 1.7 G/DL
ALP SERPL-CCNC: 49 U/L (ref 30–99)
ALT SERPL-CCNC: 35 U/L (ref 2–50)
ANION GAP SERPL CALC-SCNC: 10 MMOL/L (ref 7–16)
ANION GAP SERPL CALC-SCNC: 12 MMOL/L (ref 7–16)
AST SERPL-CCNC: 20 U/L (ref 12–45)
BASOPHILS # BLD AUTO: 0.5 % (ref 0–1.8)
BASOPHILS # BLD: 0.05 K/UL (ref 0–0.12)
BILIRUB SERPL-MCNC: 0.4 MG/DL (ref 0.1–1.5)
BUN SERPL-MCNC: 12 MG/DL (ref 8–22)
BUN SERPL-MCNC: 14 MG/DL (ref 8–22)
CALCIUM ALBUM COR SERPL-MCNC: 8.7 MG/DL (ref 8.5–10.5)
CALCIUM SERPL-MCNC: 9.1 MG/DL (ref 8.5–10.5)
CALCIUM SERPL-MCNC: 9.3 MG/DL (ref 8.5–10.5)
CHLORIDE SERPL-SCNC: 101 MMOL/L (ref 96–112)
CHLORIDE SERPL-SCNC: 99 MMOL/L (ref 96–112)
CO2 SERPL-SCNC: 21 MMOL/L (ref 20–33)
CO2 SERPL-SCNC: 23 MMOL/L (ref 20–33)
CREAT SERPL-MCNC: 1.06 MG/DL (ref 0.5–1.4)
CREAT SERPL-MCNC: 1.17 MG/DL (ref 0.5–1.4)
EOSINOPHIL # BLD AUTO: 0.01 K/UL (ref 0–0.51)
EOSINOPHIL NFR BLD: 0.1 % (ref 0–6.9)
ERYTHROCYTE [DISTWIDTH] IN BLOOD BY AUTOMATED COUNT: 37.7 FL (ref 35.9–50)
GFR SERPLBLD CREATININE-BSD FMLA CKD-EPI: 76 ML/MIN/1.73 M 2
GFR SERPLBLD CREATININE-BSD FMLA CKD-EPI: 85 ML/MIN/1.73 M 2
GLOBULIN SER CALC-MCNC: 2.8 G/DL (ref 1.9–3.5)
GLUCOSE SERPL-MCNC: 153 MG/DL (ref 65–99)
GLUCOSE SERPL-MCNC: 154 MG/DL (ref 65–99)
HCT VFR BLD AUTO: 53.9 % (ref 42–52)
HGB BLD-MCNC: 17.9 G/DL (ref 14–18)
IMM GRANULOCYTES # BLD AUTO: 0.04 K/UL (ref 0–0.11)
IMM GRANULOCYTES NFR BLD AUTO: 0.4 % (ref 0–0.9)
LYMPHOCYTES # BLD AUTO: 2.43 K/UL (ref 1–4.8)
LYMPHOCYTES NFR BLD: 26 % (ref 22–41)
MAGNESIUM SERPL-MCNC: 2.1 MG/DL (ref 1.5–2.5)
MCH RBC QN AUTO: 28.5 PG (ref 27–33)
MCHC RBC AUTO-ENTMCNC: 33.2 G/DL (ref 32.3–36.5)
MCV RBC AUTO: 86 FL (ref 81.4–97.8)
MONOCYTES # BLD AUTO: 0.27 K/UL (ref 0–0.85)
MONOCYTES NFR BLD AUTO: 2.9 % (ref 0–13.4)
NEUTROPHILS # BLD AUTO: 6.56 K/UL (ref 1.82–7.42)
NEUTROPHILS NFR BLD: 70.1 % (ref 44–72)
NRBC # BLD AUTO: 0 K/UL
NRBC BLD-RTO: 0 /100 WBC (ref 0–0.2)
PHOSPHATE SERPL-MCNC: 1.7 MG/DL (ref 2.5–4.5)
PLATELET # BLD AUTO: 327 K/UL (ref 164–446)
PMV BLD AUTO: 10.9 FL (ref 9–12.9)
POTASSIUM SERPL-SCNC: 5.3 MMOL/L (ref 3.6–5.5)
POTASSIUM SERPL-SCNC: 5.8 MMOL/L (ref 3.6–5.5)
PROT SERPL-MCNC: 7.5 G/DL (ref 6–8.2)
RBC # BLD AUTO: 6.27 M/UL (ref 4.7–6.1)
SODIUM SERPL-SCNC: 132 MMOL/L (ref 135–145)
SODIUM SERPL-SCNC: 134 MMOL/L (ref 135–145)
WBC # BLD AUTO: 9.4 K/UL (ref 4.8–10.8)

## 2024-03-05 PROCEDURE — 160035 HCHG PACU - 1ST 60 MINS PHASE I: Performed by: NEUROLOGICAL SURGERY

## 2024-03-05 PROCEDURE — 700102 HCHG RX REV CODE 250 W/ 637 OVERRIDE(OP): Performed by: STUDENT IN AN ORGANIZED HEALTH CARE EDUCATION/TRAINING PROGRAM

## 2024-03-05 PROCEDURE — 700111 HCHG RX REV CODE 636 W/ 250 OVERRIDE (IP): Mod: JZ | Performed by: STUDENT IN AN ORGANIZED HEALTH CARE EDUCATION/TRAINING PROGRAM

## 2024-03-05 PROCEDURE — 700111 HCHG RX REV CODE 636 W/ 250 OVERRIDE (IP): Performed by: ANESTHESIOLOGY

## 2024-03-05 PROCEDURE — 770001 HCHG ROOM/CARE - MED/SURG/GYN PRIV*

## 2024-03-05 PROCEDURE — 700101 HCHG RX REV CODE 250: Performed by: NEUROLOGICAL SURGERY

## 2024-03-05 PROCEDURE — A9270 NON-COVERED ITEM OR SERVICE: HCPCS | Performed by: PHYSICIAN ASSISTANT

## 2024-03-05 PROCEDURE — 700101 HCHG RX REV CODE 250: Performed by: ANESTHESIOLOGY

## 2024-03-05 PROCEDURE — 700101 HCHG RX REV CODE 250: Performed by: PHYSICIAN ASSISTANT

## 2024-03-05 PROCEDURE — 160042 HCHG SURGERY MINUTES - EA ADDL 1 MIN LEVEL 5: Performed by: NEUROLOGICAL SURGERY

## 2024-03-05 PROCEDURE — A9270 NON-COVERED ITEM OR SERVICE: HCPCS | Performed by: STUDENT IN AN ORGANIZED HEALTH CARE EDUCATION/TRAINING PROGRAM

## 2024-03-05 PROCEDURE — 700102 HCHG RX REV CODE 250 W/ 637 OVERRIDE(OP): Performed by: PHYSICIAN ASSISTANT

## 2024-03-05 PROCEDURE — 160009 HCHG ANES TIME/MIN: Performed by: NEUROLOGICAL SURGERY

## 2024-03-05 PROCEDURE — 99233 SBSQ HOSP IP/OBS HIGH 50: CPT | Performed by: INTERNAL MEDICINE

## 2024-03-05 PROCEDURE — 700105 HCHG RX REV CODE 258: Performed by: ANESTHESIOLOGY

## 2024-03-05 PROCEDURE — 0SG00A0 FUSION OF LUMBAR VERTEBRAL JOINT WITH INTERBODY FUSION DEVICE, ANTERIOR APPROACH, ANTERIOR COLUMN, OPEN APPROACH: ICD-10-PCS | Performed by: NEUROLOGICAL SURGERY

## 2024-03-05 PROCEDURE — A9270 NON-COVERED ITEM OR SERVICE: HCPCS | Performed by: ANESTHESIOLOGY

## 2024-03-05 PROCEDURE — 72100 X-RAY EXAM L-S SPINE 2/3 VWS: CPT

## 2024-03-05 PROCEDURE — 8E0WXBZ COMPUTER ASSISTED PROCEDURE OF TRUNK REGION: ICD-10-PCS | Performed by: NEUROLOGICAL SURGERY

## 2024-03-05 PROCEDURE — A9270 NON-COVERED ITEM OR SERVICE: HCPCS | Performed by: NURSE PRACTITIONER

## 2024-03-05 PROCEDURE — 700111 HCHG RX REV CODE 636 W/ 250 OVERRIDE (IP): Performed by: PHYSICIAN ASSISTANT

## 2024-03-05 PROCEDURE — 110371 HCHG SHELL REV 272: Performed by: NEUROLOGICAL SURGERY

## 2024-03-05 PROCEDURE — C1713 ANCHOR/SCREW BN/BN,TIS/BN: HCPCS | Performed by: NEUROLOGICAL SURGERY

## 2024-03-05 PROCEDURE — 110454 HCHG SHELL REV 250: Performed by: NEUROLOGICAL SURGERY

## 2024-03-05 PROCEDURE — 700105 HCHG RX REV CODE 258: Performed by: PHYSICIAN ASSISTANT

## 2024-03-05 PROCEDURE — 160002 HCHG RECOVERY MINUTES (STAT): Performed by: NEUROLOGICAL SURGERY

## 2024-03-05 PROCEDURE — 700102 HCHG RX REV CODE 250 W/ 637 OVERRIDE(OP): Performed by: NURSE PRACTITIONER

## 2024-03-05 PROCEDURE — 502240 HCHG MISC OR SUPPLY RC 0272: Performed by: NEUROLOGICAL SURGERY

## 2024-03-05 PROCEDURE — 80048 BASIC METABOLIC PNL TOTAL CA: CPT

## 2024-03-05 PROCEDURE — 700102 HCHG RX REV CODE 250 W/ 637 OVERRIDE(OP): Performed by: ANESTHESIOLOGY

## 2024-03-05 PROCEDURE — 502000 HCHG MISC OR IMPLANTS RC 0278: Performed by: NEUROLOGICAL SURGERY

## 2024-03-05 PROCEDURE — 0ST20ZZ RESECTION OF LUMBAR VERTEBRAL DISC, OPEN APPROACH: ICD-10-PCS | Performed by: NEUROLOGICAL SURGERY

## 2024-03-05 PROCEDURE — 700105 HCHG RX REV CODE 258

## 2024-03-05 PROCEDURE — 160036 HCHG PACU - EA ADDL 30 MINS PHASE I: Performed by: NEUROLOGICAL SURGERY

## 2024-03-05 PROCEDURE — 700111 HCHG RX REV CODE 636 W/ 250 OVERRIDE (IP): Performed by: NEUROLOGICAL SURGERY

## 2024-03-05 PROCEDURE — 36415 COLL VENOUS BLD VENIPUNCTURE: CPT

## 2024-03-05 PROCEDURE — 0SG1071 FUSION OF 2 OR MORE LUMBAR VERTEBRAL JOINTS WITH AUTOLOGOUS TISSUE SUBSTITUTE, POSTERIOR APPROACH, POSTERIOR COLUMN, OPEN APPROACH: ICD-10-PCS | Performed by: NEUROLOGICAL SURGERY

## 2024-03-05 PROCEDURE — 700111 HCHG RX REV CODE 636 W/ 250 OVERRIDE (IP)

## 2024-03-05 PROCEDURE — 160048 HCHG OR STATISTICAL LEVEL 1-5: Performed by: NEUROLOGICAL SURGERY

## 2024-03-05 PROCEDURE — 160031 HCHG SURGERY MINUTES - 1ST 30 MINS LEVEL 5: Performed by: NEUROLOGICAL SURGERY

## 2024-03-05 DEVICE — GRAFT CANCELLOUS CUBES 6-10MM 15CC: Type: IMPLANTABLE DEVICE | Site: SPINE LUMBAR | Status: FUNCTIONAL

## 2024-03-05 DEVICE — SCREW 7.5MM X 50MM POLYAXIAL (1EA): Type: IMPLANTABLE DEVICE | Site: SPINE LUMBAR | Status: FUNCTIONAL

## 2024-03-05 DEVICE — SCREW SET INVICTUS: Type: IMPLANTABLE DEVICE | Site: SPINE LUMBAR | Status: FUNCTIONAL

## 2024-03-05 DEVICE — IMPLANTABLE DEVICE: Type: IMPLANTABLE DEVICE | Site: SPINE LUMBAR | Status: FUNCTIONAL

## 2024-03-05 DEVICE — SCREW 6.5MM X 50MM POLYAXIAL (1EA): Type: IMPLANTABLE DEVICE | Site: SPINE LUMBAR | Status: FUNCTIONAL

## 2024-03-05 RX ORDER — VANCOMYCIN HYDROCHLORIDE 1 G/20ML
INJECTION, POWDER, LYOPHILIZED, FOR SOLUTION INTRAVENOUS
Status: COMPLETED | OUTPATIENT
Start: 2024-03-05 | End: 2024-03-05

## 2024-03-05 RX ORDER — OXYCODONE HCL 5 MG/5 ML
10 SOLUTION, ORAL ORAL
Status: COMPLETED | OUTPATIENT
Start: 2024-03-05 | End: 2024-03-05

## 2024-03-05 RX ORDER — HALOPERIDOL 5 MG/ML
1 INJECTION INTRAMUSCULAR
Status: DISCONTINUED | OUTPATIENT
Start: 2024-03-05 | End: 2024-03-05 | Stop reason: HOSPADM

## 2024-03-05 RX ORDER — SODIUM CHLORIDE, SODIUM LACTATE, POTASSIUM CHLORIDE, CALCIUM CHLORIDE 600; 310; 30; 20 MG/100ML; MG/100ML; MG/100ML; MG/100ML
INJECTION, SOLUTION INTRAVENOUS
Status: DISCONTINUED | OUTPATIENT
Start: 2024-03-05 | End: 2024-03-05 | Stop reason: SURG

## 2024-03-05 RX ORDER — OXYCODONE HCL 5 MG/5 ML
5 SOLUTION, ORAL ORAL
Status: COMPLETED | OUTPATIENT
Start: 2024-03-05 | End: 2024-03-05

## 2024-03-05 RX ORDER — CEFAZOLIN SODIUM 1 G/3ML
INJECTION, POWDER, FOR SOLUTION INTRAMUSCULAR; INTRAVENOUS PRN
Status: DISCONTINUED | OUTPATIENT
Start: 2024-03-05 | End: 2024-03-07 | Stop reason: HOSPADM

## 2024-03-05 RX ORDER — OXYCODONE HYDROCHLORIDE 5 MG/1
5 TABLET ORAL EVERY 4 HOURS PRN
Status: DISCONTINUED | OUTPATIENT
Start: 2024-03-05 | End: 2024-03-08 | Stop reason: HOSPADM

## 2024-03-05 RX ORDER — MEPERIDINE HYDROCHLORIDE 25 MG/ML
6.25 INJECTION INTRAMUSCULAR; INTRAVENOUS; SUBCUTANEOUS
Status: DISCONTINUED | OUTPATIENT
Start: 2024-03-05 | End: 2024-03-05 | Stop reason: HOSPADM

## 2024-03-05 RX ORDER — ONDANSETRON 2 MG/ML
4 INJECTION INTRAMUSCULAR; INTRAVENOUS EVERY 4 HOURS PRN
Status: DISCONTINUED | OUTPATIENT
Start: 2024-03-05 | End: 2024-03-08 | Stop reason: HOSPADM

## 2024-03-05 RX ORDER — MIDAZOLAM HYDROCHLORIDE 1 MG/ML
1 INJECTION INTRAMUSCULAR; INTRAVENOUS
Status: DISCONTINUED | OUTPATIENT
Start: 2024-03-05 | End: 2024-03-05 | Stop reason: HOSPADM

## 2024-03-05 RX ORDER — DIPHENHYDRAMINE HYDROCHLORIDE 50 MG/ML
12.5 INJECTION INTRAMUSCULAR; INTRAVENOUS
Status: DISCONTINUED | OUTPATIENT
Start: 2024-03-05 | End: 2024-03-05 | Stop reason: HOSPADM

## 2024-03-05 RX ORDER — HYDROMORPHONE HYDROCHLORIDE 1 MG/ML
0.4 INJECTION, SOLUTION INTRAMUSCULAR; INTRAVENOUS; SUBCUTANEOUS
Status: DISCONTINUED | OUTPATIENT
Start: 2024-03-05 | End: 2024-03-05 | Stop reason: HOSPADM

## 2024-03-05 RX ORDER — HYDROCODONE BITARTRATE AND ACETAMINOPHEN 5; 325 MG/1; MG/1
1 TABLET ORAL EVERY 4 HOURS PRN
Status: DISCONTINUED | OUTPATIENT
Start: 2024-03-05 | End: 2024-03-08 | Stop reason: HOSPADM

## 2024-03-05 RX ORDER — PROMETHAZINE HYDROCHLORIDE 25 MG/1
12.5-25 TABLET ORAL EVERY 4 HOURS PRN
Status: DISCONTINUED | OUTPATIENT
Start: 2024-03-05 | End: 2024-03-08 | Stop reason: HOSPADM

## 2024-03-05 RX ORDER — DOCUSATE SODIUM 100 MG/1
100 CAPSULE, LIQUID FILLED ORAL 2 TIMES DAILY
Status: DISCONTINUED | OUTPATIENT
Start: 2024-03-05 | End: 2024-03-08 | Stop reason: HOSPADM

## 2024-03-05 RX ORDER — EPHEDRINE SULFATE 50 MG/ML
5 INJECTION, SOLUTION INTRAVENOUS
Status: DISCONTINUED | OUTPATIENT
Start: 2024-03-05 | End: 2024-03-05 | Stop reason: HOSPADM

## 2024-03-05 RX ORDER — OXYCODONE HYDROCHLORIDE 10 MG/1
10 TABLET ORAL EVERY 4 HOURS PRN
Status: DISCONTINUED | OUTPATIENT
Start: 2024-03-05 | End: 2024-03-08 | Stop reason: HOSPADM

## 2024-03-05 RX ORDER — BISACODYL 10 MG
10 SUPPOSITORY, RECTAL RECTAL
Status: DISCONTINUED | OUTPATIENT
Start: 2024-03-05 | End: 2024-03-08 | Stop reason: HOSPADM

## 2024-03-05 RX ORDER — HYDROMORPHONE HYDROCHLORIDE 1 MG/ML
0.1 INJECTION, SOLUTION INTRAMUSCULAR; INTRAVENOUS; SUBCUTANEOUS
Status: DISCONTINUED | OUTPATIENT
Start: 2024-03-05 | End: 2024-03-05 | Stop reason: HOSPADM

## 2024-03-05 RX ORDER — HYDROMORPHONE HYDROCHLORIDE 1 MG/ML
0.2 INJECTION, SOLUTION INTRAMUSCULAR; INTRAVENOUS; SUBCUTANEOUS
Status: DISCONTINUED | OUTPATIENT
Start: 2024-03-05 | End: 2024-03-05 | Stop reason: HOSPADM

## 2024-03-05 RX ORDER — ONDANSETRON 2 MG/ML
INJECTION INTRAMUSCULAR; INTRAVENOUS PRN
Status: DISCONTINUED | OUTPATIENT
Start: 2024-03-05 | End: 2024-03-05 | Stop reason: SURG

## 2024-03-05 RX ORDER — ONDANSETRON 2 MG/ML
4 INJECTION INTRAMUSCULAR; INTRAVENOUS
Status: DISCONTINUED | OUTPATIENT
Start: 2024-03-05 | End: 2024-03-05 | Stop reason: HOSPADM

## 2024-03-05 RX ORDER — METHOCARBAMOL 750 MG/1
750 TABLET, FILM COATED ORAL EVERY 8 HOURS PRN
Status: DISCONTINUED | OUTPATIENT
Start: 2024-03-06 | End: 2024-03-08 | Stop reason: HOSPADM

## 2024-03-05 RX ORDER — BUPIVACAINE HYDROCHLORIDE 5 MG/ML
INJECTION, SOLUTION EPIDURAL; INTRACAUDAL
Status: DISCONTINUED | OUTPATIENT
Start: 2024-03-05 | End: 2024-03-05 | Stop reason: HOSPADM

## 2024-03-05 RX ORDER — HEPARIN SODIUM 1000 [USP'U]/ML
INJECTION, SOLUTION INTRAVENOUS; SUBCUTANEOUS
Status: DISCONTINUED | OUTPATIENT
Start: 2024-03-05 | End: 2024-03-05 | Stop reason: HOSPADM

## 2024-03-05 RX ORDER — ENEMA 19; 7 G/133ML; G/133ML
1 ENEMA RECTAL
Status: DISCONTINUED | OUTPATIENT
Start: 2024-03-05 | End: 2024-03-08 | Stop reason: HOSPADM

## 2024-03-05 RX ORDER — ENOXAPARIN SODIUM 100 MG/ML
40 INJECTION SUBCUTANEOUS DAILY
Status: DISCONTINUED | OUTPATIENT
Start: 2024-03-06 | End: 2024-03-07

## 2024-03-05 RX ORDER — PROMETHAZINE HYDROCHLORIDE 25 MG/1
12.5-25 SUPPOSITORY RECTAL EVERY 4 HOURS PRN
Status: DISCONTINUED | OUTPATIENT
Start: 2024-03-05 | End: 2024-03-08 | Stop reason: HOSPADM

## 2024-03-05 RX ORDER — DIPHENHYDRAMINE HYDROCHLORIDE 50 MG/ML
25 INJECTION INTRAMUSCULAR; INTRAVENOUS EVERY 6 HOURS PRN
Status: DISCONTINUED | OUTPATIENT
Start: 2024-03-05 | End: 2024-03-08 | Stop reason: HOSPADM

## 2024-03-05 RX ORDER — HYDROMORPHONE HYDROCHLORIDE 2 MG/ML
INJECTION, SOLUTION INTRAMUSCULAR; INTRAVENOUS; SUBCUTANEOUS PRN
Status: DISCONTINUED | OUTPATIENT
Start: 2024-03-05 | End: 2024-03-07 | Stop reason: HOSPADM

## 2024-03-05 RX ORDER — ACETAMINOPHEN 325 MG/1
650 TABLET ORAL EVERY 4 HOURS PRN
Status: DISCONTINUED | OUTPATIENT
Start: 2024-03-05 | End: 2024-03-06

## 2024-03-05 RX ORDER — AMOXICILLIN 250 MG
1 CAPSULE ORAL
Status: DISCONTINUED | OUTPATIENT
Start: 2024-03-05 | End: 2024-03-08 | Stop reason: HOSPADM

## 2024-03-05 RX ORDER — ROCURONIUM BROMIDE 10 MG/ML
INJECTION, SOLUTION INTRAVENOUS PRN
Status: DISCONTINUED | OUTPATIENT
Start: 2024-03-05 | End: 2024-03-05 | Stop reason: SURG

## 2024-03-05 RX ORDER — CEFAZOLIN SODIUM 1 G/3ML
INJECTION, POWDER, FOR SOLUTION INTRAMUSCULAR; INTRAVENOUS
Status: DISCONTINUED | OUTPATIENT
Start: 2024-03-05 | End: 2024-03-05 | Stop reason: HOSPADM

## 2024-03-05 RX ORDER — SODIUM CHLORIDE 9 MG/ML
INJECTION, SOLUTION INTRAVENOUS CONTINUOUS
Status: DISCONTINUED | OUTPATIENT
Start: 2024-03-05 | End: 2024-03-08 | Stop reason: HOSPADM

## 2024-03-05 RX ORDER — LABETALOL HYDROCHLORIDE 5 MG/ML
5 INJECTION, SOLUTION INTRAVENOUS
Status: DISCONTINUED | OUTPATIENT
Start: 2024-03-05 | End: 2024-03-05 | Stop reason: HOSPADM

## 2024-03-05 RX ORDER — BUPIVACAINE HYDROCHLORIDE AND EPINEPHRINE 5; 5 MG/ML; UG/ML
INJECTION, SOLUTION PERINEURAL
Status: DISCONTINUED | OUTPATIENT
Start: 2024-03-05 | End: 2024-03-05 | Stop reason: HOSPADM

## 2024-03-05 RX ORDER — CALCIUM CARBONATE 500 MG/1
500 TABLET, CHEWABLE ORAL 2 TIMES DAILY
Status: DISCONTINUED | OUTPATIENT
Start: 2024-03-05 | End: 2024-03-08 | Stop reason: HOSPADM

## 2024-03-05 RX ORDER — METHOCARBAMOL 750 MG/1
750 TABLET, FILM COATED ORAL EVERY 8 HOURS PRN
Status: DISCONTINUED | OUTPATIENT
Start: 2024-03-05 | End: 2024-03-05

## 2024-03-05 RX ORDER — AMOXICILLIN 250 MG
1 CAPSULE ORAL NIGHTLY
Status: DISCONTINUED | OUTPATIENT
Start: 2024-03-05 | End: 2024-03-08 | Stop reason: HOSPADM

## 2024-03-05 RX ORDER — SODIUM CHLORIDE 9 MG/ML
500 INJECTION, SOLUTION INTRAVENOUS ONCE
Status: COMPLETED | OUTPATIENT
Start: 2024-03-05 | End: 2024-03-05

## 2024-03-05 RX ORDER — HYDRALAZINE HYDROCHLORIDE 20 MG/ML
5 INJECTION INTRAMUSCULAR; INTRAVENOUS
Status: DISCONTINUED | OUTPATIENT
Start: 2024-03-05 | End: 2024-03-05 | Stop reason: HOSPADM

## 2024-03-05 RX ORDER — HYDROCODONE BITARTRATE AND ACETAMINOPHEN 10; 325 MG/1; MG/1
1 TABLET ORAL EVERY 4 HOURS PRN
Status: DISCONTINUED | OUTPATIENT
Start: 2024-03-05 | End: 2024-03-08 | Stop reason: HOSPADM

## 2024-03-05 RX ORDER — SODIUM CHLORIDE, SODIUM LACTATE, POTASSIUM CHLORIDE, CALCIUM CHLORIDE 600; 310; 30; 20 MG/100ML; MG/100ML; MG/100ML; MG/100ML
INJECTION, SOLUTION INTRAVENOUS CONTINUOUS
Status: DISCONTINUED | OUTPATIENT
Start: 2024-03-05 | End: 2024-03-05 | Stop reason: HOSPADM

## 2024-03-05 RX ORDER — ONDANSETRON 4 MG/1
4 TABLET, ORALLY DISINTEGRATING ORAL EVERY 4 HOURS PRN
Status: DISCONTINUED | OUTPATIENT
Start: 2024-03-05 | End: 2024-03-08 | Stop reason: HOSPADM

## 2024-03-05 RX ORDER — SUCCINYLCHOLINE CHLORIDE 20 MG/ML
INJECTION INTRAMUSCULAR; INTRAVENOUS PRN
Status: DISCONTINUED | OUTPATIENT
Start: 2024-03-05 | End: 2024-03-05 | Stop reason: SURG

## 2024-03-05 RX ORDER — DEXAMETHASONE SODIUM PHOSPHATE 4 MG/ML
INJECTION, SOLUTION INTRA-ARTICULAR; INTRALESIONAL; INTRAMUSCULAR; INTRAVENOUS; SOFT TISSUE PRN
Status: DISCONTINUED | OUTPATIENT
Start: 2024-03-05 | End: 2024-03-05 | Stop reason: SURG

## 2024-03-05 RX ORDER — POLYETHYLENE GLYCOL 3350 17 G/17G
1 POWDER, FOR SOLUTION ORAL 2 TIMES DAILY PRN
Status: DISCONTINUED | OUTPATIENT
Start: 2024-03-05 | End: 2024-03-08 | Stop reason: HOSPADM

## 2024-03-05 RX ORDER — LIDOCAINE HYDROCHLORIDE 20 MG/ML
INJECTION, SOLUTION EPIDURAL; INFILTRATION; INTRACAUDAL; PERINEURAL PRN
Status: DISCONTINUED | OUTPATIENT
Start: 2024-03-05 | End: 2024-03-05 | Stop reason: SURG

## 2024-03-05 RX ORDER — HYDROMORPHONE HYDROCHLORIDE 1 MG/ML
0.5 INJECTION, SOLUTION INTRAMUSCULAR; INTRAVENOUS; SUBCUTANEOUS
Status: DISCONTINUED | OUTPATIENT
Start: 2024-03-05 | End: 2024-03-08 | Stop reason: HOSPADM

## 2024-03-05 RX ORDER — PROCHLORPERAZINE EDISYLATE 5 MG/ML
5-10 INJECTION INTRAMUSCULAR; INTRAVENOUS EVERY 4 HOURS PRN
Status: DISCONTINUED | OUTPATIENT
Start: 2024-03-05 | End: 2024-03-08 | Stop reason: HOSPADM

## 2024-03-05 RX ORDER — DIPHENHYDRAMINE HCL 25 MG
25 TABLET ORAL EVERY 6 HOURS PRN
Status: DISCONTINUED | OUTPATIENT
Start: 2024-03-05 | End: 2024-03-08 | Stop reason: HOSPADM

## 2024-03-05 RX ADMIN — SODIUM CHLORIDE 500 ML: 9 INJECTION, SOLUTION INTRAVENOUS at 02:16

## 2024-03-05 RX ADMIN — ONDANSETRON 4 MG: 2 INJECTION INTRAMUSCULAR; INTRAVENOUS at 17:29

## 2024-03-05 RX ADMIN — LIDOCAINE HYDROCHLORIDE 50 MG: 20 INJECTION, SOLUTION EPIDURAL; INFILTRATION; INTRACAUDAL at 12:38

## 2024-03-05 RX ADMIN — DOCUSATE SODIUM 100 MG: 100 CAPSULE, LIQUID FILLED ORAL at 21:07

## 2024-03-05 RX ADMIN — DEXAMETHASONE SODIUM PHOSPHATE 4 MG: 4 INJECTION INTRA-ARTICULAR; INTRALESIONAL; INTRAMUSCULAR; INTRAVENOUS; SOFT TISSUE at 23:28

## 2024-03-05 RX ADMIN — FENTANYL CITRATE 50 MCG: 50 INJECTION, SOLUTION INTRAMUSCULAR; INTRAVENOUS at 13:14

## 2024-03-05 RX ADMIN — ACETAMINOPHEN 650 MG: 325 TABLET, FILM COATED ORAL at 00:00

## 2024-03-05 RX ADMIN — FENTANYL CITRATE 50 MCG: 50 INJECTION, SOLUTION INTRAMUSCULAR; INTRAVENOUS at 15:04

## 2024-03-05 RX ADMIN — CEFAZOLIN 2 G: 10 INJECTION, POWDER, FOR SOLUTION INTRAVENOUS at 21:46

## 2024-03-05 RX ADMIN — DEXAMETHASONE SODIUM PHOSPHATE 4 MG: 4 INJECTION INTRA-ARTICULAR; INTRALESIONAL; INTRAMUSCULAR; INTRAVENOUS; SOFT TISSUE at 05:26

## 2024-03-05 RX ADMIN — SODIUM CHLORIDE, POTASSIUM CHLORIDE, SODIUM LACTATE AND CALCIUM CHLORIDE: 600; 310; 30; 20 INJECTION, SOLUTION INTRAVENOUS at 12:31

## 2024-03-05 RX ADMIN — FENTANYL CITRATE 50 MCG: 50 INJECTION, SOLUTION INTRAMUSCULAR; INTRAVENOUS at 17:10

## 2024-03-05 RX ADMIN — HYDROMORPHONE HYDROCHLORIDE 0.5 MG: 2 INJECTION INTRAMUSCULAR; INTRAVENOUS; SUBCUTANEOUS at 13:59

## 2024-03-05 RX ADMIN — SUCCINYLCHOLINE CHLORIDE 180 MG: 20 INJECTION, SOLUTION INTRAMUSCULAR; INTRAVENOUS at 12:38

## 2024-03-05 RX ADMIN — CELECOXIB 200 MG: 200 CAPSULE ORAL at 05:26

## 2024-03-05 RX ADMIN — ACETAMINOPHEN 650 MG: 325 TABLET, FILM COATED ORAL at 05:26

## 2024-03-05 RX ADMIN — METHOCARBAMOL 1000 MG: 100 INJECTION INTRAMUSCULAR; INTRAVENOUS at 18:41

## 2024-03-05 RX ADMIN — CEFAZOLIN 3 G: 1 INJECTION, POWDER, FOR SOLUTION INTRAMUSCULAR; INTRAVENOUS at 13:00

## 2024-03-05 RX ADMIN — DOCUSATE SODIUM 50 MG AND SENNOSIDES 8.6 MG 1 TABLET: 8.6; 5 TABLET, FILM COATED ORAL at 21:07

## 2024-03-05 RX ADMIN — FENTANYL CITRATE 50 MCG: 50 INJECTION, SOLUTION INTRAMUSCULAR; INTRAVENOUS at 13:25

## 2024-03-05 RX ADMIN — GABAPENTIN 400 MG: 400 CAPSULE ORAL at 05:26

## 2024-03-05 RX ADMIN — FENTANYL CITRATE 50 MCG: 50 INJECTION, SOLUTION INTRAMUSCULAR; INTRAVENOUS at 13:48

## 2024-03-05 RX ADMIN — HYDROCODONE BITARTRATE AND ACETAMINOPHEN 2 TABLET: 5; 325 TABLET ORAL at 05:25

## 2024-03-05 RX ADMIN — FENTANYL CITRATE 50 MCG: 50 INJECTION, SOLUTION INTRAMUSCULAR; INTRAVENOUS at 17:47

## 2024-03-05 RX ADMIN — FENTANYL CITRATE 50 MCG: 50 INJECTION, SOLUTION INTRAMUSCULAR; INTRAVENOUS at 17:52

## 2024-03-05 RX ADMIN — HYDROMORPHONE HYDROCHLORIDE 0.5 MG: 2 INJECTION INTRAMUSCULAR; INTRAVENOUS; SUBCUTANEOUS at 12:38

## 2024-03-05 RX ADMIN — DEXAMETHASONE SODIUM PHOSPHATE 4 MG: 4 INJECTION INTRA-ARTICULAR; INTRALESIONAL; INTRAMUSCULAR; INTRAVENOUS; SOFT TISSUE at 00:01

## 2024-03-05 RX ADMIN — CEFAZOLIN 3 G: 1 INJECTION, POWDER, FOR SOLUTION INTRAMUSCULAR; INTRAVENOUS at 16:56

## 2024-03-05 RX ADMIN — SODIUM CHLORIDE: 9 INJECTION, SOLUTION INTRAVENOUS at 20:42

## 2024-03-05 RX ADMIN — FENTANYL CITRATE 50 MCG: 50 INJECTION, SOLUTION INTRAMUSCULAR; INTRAVENOUS at 15:42

## 2024-03-05 RX ADMIN — PROPOFOL 250 MG: 10 INJECTION, EMULSION INTRAVENOUS at 12:38

## 2024-03-05 RX ADMIN — SODIUM BICARBONATE 50 MEQ: 84 INJECTION, SOLUTION INTRAVENOUS at 03:22

## 2024-03-05 RX ADMIN — HYDROCODONE BITARTRATE AND ACETAMINOPHEN 1 TABLET: 10; 325 TABLET ORAL at 22:17

## 2024-03-05 RX ADMIN — ANTACID TABLETS 500 MG: 500 TABLET, CHEWABLE ORAL at 21:07

## 2024-03-05 RX ADMIN — OXYCODONE HYDROCHLORIDE 10 MG: 5 SOLUTION ORAL at 18:40

## 2024-03-05 RX ADMIN — HYDROMORPHONE HYDROCHLORIDE 0.4 MG: 1 INJECTION, SOLUTION INTRAMUSCULAR; INTRAVENOUS; SUBCUTANEOUS at 19:32

## 2024-03-05 RX ADMIN — HYDROMORPHONE HYDROCHLORIDE 0.5 MG: 2 INJECTION INTRAMUSCULAR; INTRAVENOUS; SUBCUTANEOUS at 15:08

## 2024-03-05 RX ADMIN — ROCURONIUM BROMIDE 5 MG: 50 INJECTION, SOLUTION INTRAVENOUS at 12:38

## 2024-03-05 RX ADMIN — FENTANYL CITRATE 50 MCG: 50 INJECTION, SOLUTION INTRAMUSCULAR; INTRAVENOUS at 16:49

## 2024-03-05 RX ADMIN — HYDROMORPHONE HYDROCHLORIDE 0.5 MG: 2 INJECTION INTRAMUSCULAR; INTRAVENOUS; SUBCUTANEOUS at 12:44

## 2024-03-05 RX ADMIN — METHOCARBAMOL 750 MG: 750 TABLET ORAL at 05:26

## 2024-03-05 RX ADMIN — ALBUTEROL SULFATE 2.5 MG: 2.5 SOLUTION RESPIRATORY (INHALATION) at 18:07

## 2024-03-05 RX ADMIN — DEXAMETHASONE SODIUM PHOSPHATE 10 MG: 4 INJECTION INTRA-ARTICULAR; INTRALESIONAL; INTRAMUSCULAR; INTRAVENOUS; SOFT TISSUE at 12:40

## 2024-03-05 RX ADMIN — Medication 30 MG: at 14:45

## 2024-03-05 RX ADMIN — HALOPERIDOL LACTATE 1 MG: 5 INJECTION, SOLUTION INTRAMUSCULAR at 17:53

## 2024-03-05 RX ADMIN — PROPOFOL 50 MG: 10 INJECTION, EMULSION INTRAVENOUS at 15:08

## 2024-03-05 ASSESSMENT — PAIN DESCRIPTION - PAIN TYPE
TYPE: ACUTE PAIN;SURGICAL PAIN
TYPE: ACUTE PAIN

## 2024-03-05 NOTE — CARE PLAN
The patient is Stable - Low risk of patient condition declining or worsening    Shift Goals  Clinical Goals: Pain Control, Safety and comfort, Surgery  Patient Goals: Pain Control, surgery  Family Goals: not present at bedside    Progress made toward(s) clinical / shift goals:    Problem: Pain - Standard  Goal: Alleviation of pain or a reduction in pain to the patient’s comfort goal  Outcome: Progressing  Patient alert and oriented x4. C/o of pain, medicated per MAR. Educated on pain scale.      Problem: Knowledge Deficit - Standard  Goal: Patient and family/care givers will demonstrate understanding of plan of care, disease process/condition, diagnostic tests and medications  Outcome: Progressing   Plan of care was discuss with patient. Encouraged to verbalize feelings. Questions was answered.     Patient is not progressing towards the following goals:

## 2024-03-05 NOTE — ASSESSMENT & PLAN NOTE
- Body mass index is 38.74 kg/m²..  -  on weight loss.  - outpatient referral for outpatient weight management.

## 2024-03-05 NOTE — THERAPY
Occupational Therapy Contact Note    Patient Name: Luis Carlos Singh  Age:  48 y.o., Sex:  male  Medical Record #: [id number]  Today's Date: 3/5/2024         03/05/24 0814   Interdisciplinary Plan of Care Collaboration   Collaboration Comments OT referral received. Pt awaiting lumbar surgery. Will complete OT eval post-op as indicated.

## 2024-03-05 NOTE — ASSESSMENT & PLAN NOTE
- MRI showed severe canal stenosis at L2-3 with cauda equina impingement, with bilateral moderate to severe foraminal narrowing with impingement upon the exiting L2 nerves.    - now s/p L2-L3 lateral discectomy and titanium interbody cage placement, lateral interbody/allograft arthrodesis and fusion, and lateral plating fixation 3/5/2024.  Continues to have significant output from YAZ drain.  Continue to hold prophylactic Lovenox for now.  -Off IV Decadron.  Has muscle spasms, start scheduled Flexeril, along with as needed Robaxin.   Continue as needed oral Norco and IV Dilaudid.  -Continue LSO brace.  -Outpatient skilled therapies per PT/OT recommendations.  Referrals placed.

## 2024-03-05 NOTE — CARE PLAN
Problem: Pain - Standard  Goal: Alleviation of pain or a reduction in pain to the patient’s comfort goal  Outcome: Progressing     Problem: Knowledge Deficit - Standard  Goal: Patient and family/care givers will demonstrate understanding of plan of care, disease process/condition, diagnostic tests and medications  Outcome: Progressing     Problem: Urinary Elimination  Goal: Establish and maintain regular urinary output  Outcome: Progressing   The patient is Stable - Low risk of patient condition declining or worsening    Shift Goals  Clinical Goals: Pain Control, Safety and comfort, Surgery  Patient Goals: Pain Control, surgery  Family Goals: not present at bedside    Progress made toward(s) clinical / shift goals:      Patient is not progressing towards the following goals:

## 2024-03-05 NOTE — ANESTHESIA PROCEDURE NOTES
Airway    Date/Time: 3/5/2024 12:39 PM    Performed by: Abel Su M.D.  Authorized by: Abel Su M.D.    Location:  OR  Urgency:  Elective  Indications for Airway Management:  Anesthesia      Spontaneous Ventilation: absent    Sedation Level:  Deep  Preoxygenated: Yes    Patient Position:  Sniffing  Mask Difficulty Assessment:  0 - not attempted  Final Airway Type:  Endotracheal airway  Final Endotracheal Airway:  ETT  Cuffed: Yes    Technique Used for Successful ETT Placement:  Direct laryngoscopy    Insertion Site:  Oral  Blade Type:  Glide  Laryngoscope Blade/Videolaryngoscope Blade Size:  3  ETT Size (mm):  7.5  Measured from:  Teeth  ETT to Teeth (cm):  22  Placement Verified by: auscultation and capnometry    Cormack-Lehane Classification:  Grade I - full view of glottis  Number of Attempts at Approach:  1

## 2024-03-05 NOTE — PROGRESS NOTES
0153 Voalted hospitalist Ed Scott PA-C - Patient's potassium 5.8. no chest pain. No difficulty breathing. /75, HR 76, SpO2 94%, R 16.     0203 Received orders for IVF and bicarb and then a repeat in an hour when finished.     0221 Called pharmacy and spoke with abril Stringer tube medication.     0430 -  ml bolus and bicarb given. Repeat K result 5.3, hospitalist informed.     0681 Report given to pre-op nurse Gwendolyn Cullen.

## 2024-03-05 NOTE — ASSESSMENT & PLAN NOTE
- Not in acute exacerbation.  Not hypoxic.  No wheezing on exam.  -Continue respiratory support, as needed bronchodilators.  Keep saturations above 88%.

## 2024-03-05 NOTE — PROGRESS NOTES
Patient had been on track for outpatient elective lumbar fusion, but developing cauda equina symptoms now. OR tomorrow for lateral interbody fusion for sagittal balance correction and lumbar laminectomy and fusion. Appreciate hospitalist service assistance

## 2024-03-05 NOTE — CARE PLAN
The patient is Stable - Low risk of patient condition declining or worsening    Shift Goals  Clinical Goals: pain control, comfort, safety  Patient Goals: pain control, comfort  Family Goals: comfort    Progress made toward(s) clinical / shift goals:  YES      Problem: Pain - Standard  Goal: Alleviation of pain or a reduction in pain to the patient’s comfort goal  Outcome: Progressing     Problem: Knowledge Deficit - Standard  Goal: Patient and family/care givers will demonstrate understanding of plan of care, disease process/condition, diagnostic tests and medications  Outcome: Progressing     Problem: Urinary Elimination  Goal: Establish and maintain regular urinary output  Outcome: Progressing

## 2024-03-05 NOTE — THERAPY
Physical Therapy Contact Note    Patient Name: Luis Carlos Singh  Age:  48 y.o., Sex:  male  Medical Record #: [id number]  Today's Date: 3/5/2024    PT consult received. Pt is scheduled for OR today for spine sx s/p cauda equina. Will f/u post-op as able/appropriate.

## 2024-03-05 NOTE — PROGRESS NOTES
"Hospital Medicine Daily Progress Note    Date of Service  3/5/2024    Chief Complaint  Low back pain    Hospital Course  Luis Carlos Singh is a 48 y.o. male with history of asthma, chronic back pain for which he follows Dr. Lauren, who presented 3/4/2024 to ER at the referral neurosurgery [facility] for evaluation of lumbar radiculopathy with worsening right lower extremity pain, weakness and groin numbness, urinary retention with mild episode of urinary incontinence without bowel incontinence, with suspicion for cauda equina syndrome.  On evaluation, WBC was 11,300.  Electrolytes and renal function were normal.  LFTs were normal.  MRI showed severe canal stenosis at L2-3 with cauda equina impingement, with bilateral moderate to severe foraminal narrowing with impingement upon the exiting L2 nerves.  Neurosurgery was called, and recommended surgery for lateral interbody fusion with sagittal balance correction and lumbar laminectomy and fusion.  Patient was started on Decadron, along with analgesics.      Interval Problem Update  3/5/2024 - I reviewed the patient's chart. There were no significant overnight events. Remains hemodynamically stable and afebrile. Stable on RA.  Sodium 134.  Renal function remain normal.  WBC count has normalized.  Potassium was mildly elevated at 5.8 last night, which improved with IV fluids and bicarbonate.    > I have personally seen and examined the patient today.  States pain on the legs manageable.  Still numb on the groin.  Has no sensation of bladder fullness, but able to empty his bladder when he senses \"funny\" feeling inside.  No chest pain or shortness of breath.    I personally reviewed all lab results mentioned above. Prior medical records from this institution and outside facilities were independently reviewed as noted. I also personally reviewed all ER physician and consultant recommendations and plans as documented above. History was independently obtained by myself. I have " discussed this patient's plan of care and discharge plan at IDT rounds today with Case Management, Nursing, Nursing leadership, and other members of the IDT team.    Consultants/Specialty  neurosurgery    Code Status  Full Code    Disposition  The patient is not medically cleared for discharge to home or a post-acute facility.      Discharge plan TBD postoperatively.  PT/OT evaluation.  I have placed the appropriate orders for post-discharge needs.    Review of Systems  ROS     Pertinent positives/negatives as mentioned above.     A complete review of systems was personally done by me. All other systems were negative.       Physical Exam  Temp:  [36.3 °C (97.3 °F)-37 °C (98.6 °F)] 36.6 °C (97.8 °F)  Pulse:  [] 105  Resp:  [14-18] 18  BP: (107-139)/() 139/86  SpO2:  [91 %-96 %] 95 %    Physical Exam  Vitals reviewed.   Constitutional:       General: He is not in acute distress.     Appearance: Normal appearance. He is obese. He is not toxic-appearing or diaphoretic.      Comments: Body mass index is 38.74 kg/m².   HENT:      Head: Normocephalic and atraumatic.      Right Ear: External ear normal.      Left Ear: External ear normal.      Mouth/Throat:      Mouth: Mucous membranes are moist.      Pharynx: No oropharyngeal exudate.   Eyes:      General: No scleral icterus.     Extraocular Movements: Extraocular movements intact.      Conjunctiva/sclera: Conjunctivae normal.      Pupils: Pupils are equal, round, and reactive to light.   Cardiovascular:      Rate and Rhythm: Normal rate and regular rhythm.      Heart sounds: Normal heart sounds. No murmur heard.     No gallop.   Pulmonary:      Effort: Pulmonary effort is normal. No respiratory distress.      Breath sounds: Normal breath sounds. No stridor. No wheezing, rhonchi or rales.   Chest:      Chest wall: No tenderness.   Abdominal:      General: Bowel sounds are normal. There is no distension.      Palpations: Abdomen is soft. There is no mass.       Tenderness: There is no abdominal tenderness. There is no guarding or rebound.   Musculoskeletal:         General: No swelling. Normal range of motion.      Cervical back: Normal range of motion and neck supple.      Right lower leg: No edema.      Left lower leg: No edema.   Lymphadenopathy:      Cervical: No cervical adenopathy.   Skin:     General: Skin is warm and dry.      Coloration: Skin is not jaundiced.      Findings: No rash.   Neurological:      General: No focal deficit present.      Mental Status: He is alert and oriented to person, place, and time.      Cranial Nerves: No cranial nerve deficit.   Psychiatric:         Mood and Affect: Mood normal.         Behavior: Behavior normal.         Thought Content: Thought content normal.         Judgment: Judgment normal.         Fluids    Intake/Output Summary (Last 24 hours) at 3/5/2024 1141  Last data filed at 3/4/2024 2047  Gross per 24 hour   Intake --   Output 700 ml   Net -700 ml       Laboratory  Recent Labs     03/04/24  1434 03/04/24  2351   WBC 11.3* 9.4   RBC 6.34* 6.27*   HEMOGLOBIN 18.1* 17.9   HEMATOCRIT 53.9* 53.9*   MCV 85.0 86.0   MCH 28.5 28.5   MCHC 33.6 33.2   RDW 37.1 37.7   PLATELETCT 300 327   MPV 10.6 10.9     Recent Labs     03/04/24  1434 03/04/24  2351 03/05/24  0453   SODIUM 137 132* 134*   POTASSIUM 4.2 5.8* 5.3   CHLORIDE 101 99 101   CO2 22 21 23   GLUCOSE 74 153* 154*   BUN 11 14 12   CREATININE 0.80 1.17 1.06   CALCIUM 9.3 9.3 9.1     Recent Labs     03/04/24  1434   APTT 28.8   INR 1.04               Imaging  MR-LUMBAR SPINE-W/O   Final Result         Severe canal stenosis at L2-3 with cauda equina impingement, unchanged from the prior examination. There is bilateral moderate-to-severe foraminal narrowing with impingement upon the exiting L2 nerves.      Postoperative changes with well decompressed spinal canal at the L4-5, L5-S1 level.      Bilateral moderate foraminal narrowing at L5-S1, stable from previous examination.          DX-PORTABLE FLUORO > 1 HOUR    (Results Pending)   DX-LUMBAR SPINE-2 OR 3 VIEWS    (Results Pending)   DX-O-ARM    (Results Pending)        Assessment/Plan  * Cauda equina syndrome (HCC)- (present on admission)  Assessment & Plan  - Suspected cauda equina syndrome. MRI showed severe canal stenosis at L2-3 with cauda equina impingement, with bilateral moderate to severe foraminal narrowing with impingement upon the exiting L2 nerves.    -Neurosurgery on board, plan for lateral interbody fusion with sagittal balance correction and lumbar laminectomy and fusion today.  -Continue IV Decadron for now, along with scheduled Robaxin and NSAIDs.  Continue as needed oral Norco and IV Dilaudid.  -PT/OT evaluation postoperatively.    Hyperkalemia  Assessment & Plan  - Resolved.  Continue to monitor.  BMP in the morning    Lumbar radiculopathy, acute- (present on admission)  Assessment & Plan  - Management as above.    Class 2 obesity in adult- (present on admission)  Assessment & Plan  - Body mass index is 38.74 kg/m²..  -  on weight loss.  - outpatient referral for outpatient weight management.      Neuropathy- (present on admission)  Assessment & Plan  -Continue gabapentin    Hypogonadotropic hypogonadism (HCC)- (present on admission)  Assessment & Plan  - Per history  -Was on testosterone  -Continue outpatient follow-up    Asthma-chronic obstructive pulmonary disease overlap syndrome- (present on admission)  Assessment & Plan  - Not in acute exacerbation.  Not hypoxic.  No wheezing on exam.  -Continue respiratory support, as needed bronchodilators.  Keep saturations above 88%.         VTE prophylaxis: SCD    My total time spent caring for the patient on the day of the encounter was 50 minutes. This does not include time spent on separately billable procedures/tests.

## 2024-03-05 NOTE — PROGRESS NOTES
Neurosurgery Progress Note    Subjective:  48 year old male presenting with symptoms of cauda equina syndrome.  He was previously seen in clinic and tentatively scheduled for L2/3 XLIF with laminectomy and posterior fusion. He presents with worsening right lower extremity pain and weakness as well as groin numbness and urinary retention. He has hx of solid bony fusion from L3-S1.     He is doing better today with the addition of pain medications. He reports continued numbness to the groin. He has been urinating without issues.     Exam:  Pleasant and cooperative   Lower ext strength 4/5 to R IP otherwise 5/5   Sensation diminished to L2/3 distribution to light touch       BP  Min: 107/66  Max: 133/102  Pulse  Av.6  Min: 79  Max: 94  Resp  Avg: 15.6  Min: 14  Max: 16  Temp  Av.6 °C (97.9 °F)  Min: 36.3 °C (97.3 °F)  Max: 37 °C (98.6 °F)  SpO2  Av %  Min: 91 %  Max: 96 %    No data recorded    Recent Labs     24  1434 24  2351   WBC 11.3* 9.4   RBC 6.34* 6.27*   HEMOGLOBIN 18.1* 17.9   HEMATOCRIT 53.9* 53.9*   MCV 85.0 86.0   MCH 28.5 28.5   MCHC 33.6 33.2   RDW 37.1 37.7   PLATELETCT 300 327   MPV 10.6 10.9     Recent Labs     24  1434 24  2351 24  0453   SODIUM 137 132* 134*   POTASSIUM 4.2 5.8* 5.3   CHLORIDE 101 99 101   CO2 22 21 23   GLUCOSE 74 153* 154*   BUN 11 14 12   CREATININE 0.80 1.17 1.06   CALCIUM 9.3 9.3 9.1     Recent Labs     24  1434   APTT 28.8   INR 1.04           Intake/Output                         24 - 2459 24 - 2459     [id number] Total [id number] Total                 Intake    Total Intake -- -- -- -- -- --       Output    Urine  --  700 700  --  -- --    Urine Void (mL) -- 700 700 -- -- --    Total Output --  -- -- --       Net I/O     -- - -- -- --              Intake/Output Summary (Last 24 hours) at 3/5/2024 0722  Last data filed at 3/4/2024 2047  Gross per 24 hour    Intake --   Output 700 ml   Net -700 ml       $ Bladder Scan Results (mL): 590     albuterol  2 Puff Q4HRS PRN    gabapentin  400 mg TID    Pharmacy Consult Request  1 Each PHARMACY TO DOSE    acetaminophen  650 mg Q6HRS    Followed by    [START ON 3/9/2024] acetaminophen  650 mg Q6HRS PRN    celecoxib  200 mg BID    Followed by    [START ON 3/9/2024] celecoxib  200 mg BID PRN    HYDROmorphone  0.5 mg Q3HRS PRN    methocarbamol  750 mg TID    dexamethasone  4 mg Q6HRS    lidocaine  1 Patch Q24HR    NS   Continuous    senna-docusate  2 Tablet Q EVENING    And    polyethylene glycol/lytes  1 Packet QDAY PRN    labetalol  10 mg Q4HRS PRN    ondansetron  4 mg Q4HRS PRN    ondansetron  4 mg Q4HRS PRN    promethazine  12.5-25 mg Q4HRS PRN    promethazine  12.5-25 mg Q4HRS PRN    prochlorperazine  5-10 mg Q4HRS PRN    ipratropium-albuterol  3 mL Q4H PRN (RT)    HYDROcodone-acetaminophen  1 Tablet Q6HRS PRN    HYDROcodone-acetaminophen  2 Tablet Q6HRS PRN       Assessment and Plan:  Hospital day # 2  POD#0 L2/3 XLIF with L2 laminectomy and extension of fusion to L2  He is NPO     Chemical prophylactic DVT therapy: No  Start date/time: tomorrow 3/6

## 2024-03-05 NOTE — ANESTHESIA PREPROCEDURE EVALUATION
Case: [id number] Date/Time: 03/05/24 4075    Procedures:       FUSION, SPINE, LUMBAR, WITH O-ARM IMAGING GUIDANCE, L2-3 EXTREME LATERAL INTERBODY FUSION, L2-3 LAMINECTOMY WITH EXTENSION TO L2 WITH STEALTH AND HIP BONE MARROW      LAMINECTOMY, SPINE, LUMBAR, WITH DISCECTOMY      ASPIRATION, BONE MARROW    Anesthesia type: General    Location: [facility] OR  / SURGERY [facility]    Surgeons: Sandro Lauren III, M.D.            Relevant Problems   PULMONARY   (positive) Asthma-chronic obstructive pulmonary disease overlap syndrome      Other   (positive) Cauda equina syndrome (HCC)   (positive) Chronic low back pain   (positive) Class 2 obesity in adult   (positive) Facial nerve palsy   (positive) Hyperkalemia   (positive) Hypogonadotropic hypogonadism (HCC)   (positive) Lumbar radiculopathy, acute   (positive) Neuropathy   Hyper K improved s/p fluid bolus/bicarb    Physical Exam    Airway   Mallampati: II  TM distance: >3 FB  Neck ROM: full       Cardiovascular - normal exam  Rhythm: regular  Rate: normal  (-) murmur     Dental - normal exam           Pulmonary - normal exam  Breath sounds clear to auscultation     Abdominal    Neurological - normal exam                   Anesthesia Plan    ASA 2       Plan - general       Airway plan will be ETT          Induction: intravenous    Postoperative Plan: Postoperative administration of opioids is intended.    Pertinent diagnostic labs and testing reviewed    Informed Consent:    Anesthetic plan and risks discussed with patient.    Use of blood products discussed with: patient whom consented to blood products.

## 2024-03-06 LAB
ANION GAP SERPL CALC-SCNC: 9 MMOL/L (ref 7–16)
BUN SERPL-MCNC: 10 MG/DL (ref 8–22)
CALCIUM SERPL-MCNC: 8.6 MG/DL (ref 8.5–10.5)
CHLORIDE SERPL-SCNC: 104 MMOL/L (ref 96–112)
CO2 SERPL-SCNC: 27 MMOL/L (ref 20–33)
CREAT SERPL-MCNC: 0.91 MG/DL (ref 0.5–1.4)
GFR SERPLBLD CREATININE-BSD FMLA CKD-EPI: 102 ML/MIN/1.73 M 2
GLUCOSE SERPL-MCNC: 124 MG/DL (ref 65–99)
POTASSIUM SERPL-SCNC: 4.3 MMOL/L (ref 3.6–5.5)
SODIUM SERPL-SCNC: 140 MMOL/L (ref 135–145)

## 2024-03-06 PROCEDURE — 700111 HCHG RX REV CODE 636 W/ 250 OVERRIDE (IP): Mod: JZ | Performed by: STUDENT IN AN ORGANIZED HEALTH CARE EDUCATION/TRAINING PROGRAM

## 2024-03-06 PROCEDURE — 700102 HCHG RX REV CODE 250 W/ 637 OVERRIDE(OP): Performed by: STUDENT IN AN ORGANIZED HEALTH CARE EDUCATION/TRAINING PROGRAM

## 2024-03-06 PROCEDURE — A9270 NON-COVERED ITEM OR SERVICE: HCPCS | Performed by: PHYSICIAN ASSISTANT

## 2024-03-06 PROCEDURE — 99233 SBSQ HOSP IP/OBS HIGH 50: CPT | Performed by: INTERNAL MEDICINE

## 2024-03-06 PROCEDURE — 97535 SELF CARE MNGMENT TRAINING: CPT

## 2024-03-06 PROCEDURE — 700105 HCHG RX REV CODE 258: Performed by: PHYSICIAN ASSISTANT

## 2024-03-06 PROCEDURE — A9270 NON-COVERED ITEM OR SERVICE: HCPCS | Performed by: STUDENT IN AN ORGANIZED HEALTH CARE EDUCATION/TRAINING PROGRAM

## 2024-03-06 PROCEDURE — 770001 HCHG ROOM/CARE - MED/SURG/GYN PRIV*

## 2024-03-06 PROCEDURE — 700111 HCHG RX REV CODE 636 W/ 250 OVERRIDE (IP): Performed by: PHYSICIAN ASSISTANT

## 2024-03-06 PROCEDURE — 36415 COLL VENOUS BLD VENIPUNCTURE: CPT

## 2024-03-06 PROCEDURE — 97162 PT EVAL MOD COMPLEX 30 MIN: CPT

## 2024-03-06 PROCEDURE — 700101 HCHG RX REV CODE 250: Performed by: PHYSICIAN ASSISTANT

## 2024-03-06 PROCEDURE — 97165 OT EVAL LOW COMPLEX 30 MIN: CPT

## 2024-03-06 PROCEDURE — 700102 HCHG RX REV CODE 250 W/ 637 OVERRIDE(OP): Performed by: PHYSICIAN ASSISTANT

## 2024-03-06 PROCEDURE — 80048 BASIC METABOLIC PNL TOTAL CA: CPT

## 2024-03-06 RX ADMIN — DEXAMETHASONE SODIUM PHOSPHATE 4 MG: 4 INJECTION INTRA-ARTICULAR; INTRALESIONAL; INTRAMUSCULAR; INTRAVENOUS; SOFT TISSUE at 05:30

## 2024-03-06 RX ADMIN — CEFAZOLIN 2 G: 10 INJECTION, POWDER, FOR SOLUTION INTRAVENOUS at 05:35

## 2024-03-06 RX ADMIN — Medication 1 APPLICATOR: at 17:39

## 2024-03-06 RX ADMIN — HYDROCODONE BITARTRATE AND ACETAMINOPHEN 1 TABLET: 10; 325 TABLET ORAL at 19:34

## 2024-03-06 RX ADMIN — GABAPENTIN 400 MG: 400 CAPSULE ORAL at 12:30

## 2024-03-06 RX ADMIN — DOCUSATE SODIUM 50 MG AND SENNOSIDES 8.6 MG 1 TABLET: 8.6; 5 TABLET, FILM COATED ORAL at 20:09

## 2024-03-06 RX ADMIN — ACETAMINOPHEN 650 MG: 325 TABLET, FILM COATED ORAL at 17:39

## 2024-03-06 RX ADMIN — HYDROCODONE BITARTRATE AND ACETAMINOPHEN 1 TABLET: 10; 325 TABLET ORAL at 02:28

## 2024-03-06 RX ADMIN — Medication 1 APPLICATOR: at 05:28

## 2024-03-06 RX ADMIN — DOCUSATE SODIUM 100 MG: 100 CAPSULE, LIQUID FILLED ORAL at 05:29

## 2024-03-06 RX ADMIN — METHOCARBAMOL 1000 MG: 100 INJECTION INTRAMUSCULAR; INTRAVENOUS at 13:20

## 2024-03-06 RX ADMIN — HYDROCODONE BITARTRATE AND ACETAMINOPHEN 1 TABLET: 10; 325 TABLET ORAL at 10:47

## 2024-03-06 RX ADMIN — GABAPENTIN 400 MG: 400 CAPSULE ORAL at 17:39

## 2024-03-06 RX ADMIN — ENOXAPARIN SODIUM 40 MG: 100 INJECTION SUBCUTANEOUS at 17:39

## 2024-03-06 RX ADMIN — ACETAMINOPHEN 650 MG: 325 TABLET, FILM COATED ORAL at 05:28

## 2024-03-06 RX ADMIN — DEXAMETHASONE SODIUM PHOSPHATE 4 MG: 4 INJECTION INTRA-ARTICULAR; INTRALESIONAL; INTRAMUSCULAR; INTRAVENOUS; SOFT TISSUE at 12:31

## 2024-03-06 RX ADMIN — ACETAMINOPHEN 650 MG: 325 TABLET, FILM COATED ORAL at 12:31

## 2024-03-06 RX ADMIN — ANTACID TABLETS 500 MG: 500 TABLET, CHEWABLE ORAL at 17:38

## 2024-03-06 RX ADMIN — GABAPENTIN 400 MG: 400 CAPSULE ORAL at 05:28

## 2024-03-06 RX ADMIN — CEFAZOLIN 2 G: 10 INJECTION, POWDER, FOR SOLUTION INTRAVENOUS at 14:47

## 2024-03-06 RX ADMIN — HYDROCODONE BITARTRATE AND ACETAMINOPHEN 1 TABLET: 10; 325 TABLET ORAL at 14:46

## 2024-03-06 RX ADMIN — DOCUSATE SODIUM 100 MG: 100 CAPSULE, LIQUID FILLED ORAL at 17:39

## 2024-03-06 RX ADMIN — CHOLECALCIFEROL TAB 125 MCG (5000 UNIT) 5000 UNITS: 125 TAB at 05:29

## 2024-03-06 RX ADMIN — METHOCARBAMOL 1000 MG: 100 INJECTION INTRAMUSCULAR; INTRAVENOUS at 05:39

## 2024-03-06 RX ADMIN — ANTACID TABLETS 500 MG: 500 TABLET, CHEWABLE ORAL at 05:28

## 2024-03-06 RX ADMIN — HYDROCODONE BITARTRATE AND ACETAMINOPHEN 1 TABLET: 10; 325 TABLET ORAL at 06:34

## 2024-03-06 ASSESSMENT — PAIN DESCRIPTION - PAIN TYPE
TYPE: ACUTE PAIN;SURGICAL PAIN
TYPE: SURGICAL PAIN

## 2024-03-06 ASSESSMENT — COGNITIVE AND FUNCTIONAL STATUS - GENERAL
SUGGESTED CMS G CODE MODIFIER MOBILITY: CJ
MOBILITY SCORE: 22
DAILY ACTIVITIY SCORE: 24
SUGGESTED CMS G CODE MODIFIER DAILY ACTIVITY: CH
WALKING IN HOSPITAL ROOM: A LITTLE
CLIMB 3 TO 5 STEPS WITH RAILING: A LITTLE

## 2024-03-06 ASSESSMENT — GAIT ASSESSMENTS
GAIT LEVEL OF ASSIST: STANDBY ASSIST
ASSISTIVE DEVICE: FRONT WHEEL WALKER
DEVIATION: BRADYKINETIC
DISTANCE (FEET): 100

## 2024-03-06 ASSESSMENT — ACTIVITIES OF DAILY LIVING (ADL): TOILETING: INDEPENDENT

## 2024-03-06 NOTE — OP REPORT
DATE OF SERVICE:  03/05/2024     PREOPERATIVE DIAGNOSES:  1.  Junctional spondylosis.  2.  Junctional stenosis.  3.  Previous L3-S1 laminectomy fusion by another practitioner.  4.  Cauda equina syndrome.  5.  Lumbar radiculopathy.     POSTOPERATIVE DIAGNOSES:  1.  Junctional spondylosis.  2.  Junctional stenosis.  3.  Previous L3-S1 laminectomy fusion by another practitioner.  4.  Cauda equina syndrome.  5.  Lumbar radiculopathy.     PROCEDURES PERFORMED:  1.  Left lateral retroperitoneal approach to the L2-L3 spine.  2.  L2-L3 lateral diskectomy and titanium interbody cage placement.  3.  L2-L3 lateral interbody/allograft arthrodesis and fusion.  4.  L2-L3 lateral plating fixation.  5.  Intraoperative monitoring.     SURGEON:  Sandro Lauren III, MD     ASSISTANT:  Gwendolyn Bernal PA-C     ANESTHESIA:  General.     ESTIMATED BLOOD LOSS:  5 mL.     FINDINGS:  Nice lordosis reconstruction with a 15 degree graft, will remain in   the OR for stage II in the prone position.     COMPLICATIONS:  None.     DRAINS LEFT:  None.     DISPOSITION:  As above.     HISTORY OF PRESENT ILLNESS:  This is a 48-year-old man who has been worked up   for junctional stenosis, spondylosis above the fusion with a positive sagittal   balance deformity and needle 1 level lordotic reconstruction and repeat   decompression extension of fusion.  Unfortunately, he developed cauda equina   syndrome, came in to the ER and we are conforming the stay to be within 24   hours of that window.  I explained the risks, benefits and alternatives of the   lateral and then the posterior procedure and this includes pain, infection,   bleeding, CSF leak and failure to completely resolve all symptoms, neurologic   deficits including pain, numbness, weakness, numbness, bladder or bowel   difficulties, failure of fixation, failure of fusion, need for rostral caudal   extensions due to junctional stenosis, injury to the bowel contents, great   vessels, ureter and  temporary or permanent sensory motor neuropathies.  He   understood the risks, benefits and agreed to consent.     SUMMARY OF OPERATIVE PROCEDURE:  The patient was taken to the operating suite,   placed under general anesthesia, was on a regular bed supine, rolled into the   right lateral decubitus position with arms on the Alphatec lateral padded   positioner.  All padded pressure points secured, arms up in winger position.    A SafeOp monitoring hooked up by SSEPs and EMGs never any changes during the   case.  The patient was placed orthogonally on the bed and strapped in safely   with all padded pressure points secured.  X-ray fluoroscopy was brought in,   drilled left lateral retroperitoneal approach. Incisions at the L2-L3 disk   space infiltrated with Marcaine with epinephrine.  Preoperative antibiotics   were given.  Proper timeout was performed.  The patient was prepped and draped   in sterile fashion.     A linear incision was made and soft tissues dissected with bipolar and   monopolar electrocautery.  Once we got to the fascia, we did all blunt   dissection techniques with the dilators and Penfield 4, getting through the   external oblique and internal oblique and transversalis fascia, popping into   the retroperitoneal fat.  We docked initially more posterior portion with   finding nerve stimulating at 6 milliamps, so we removed a little anterior and   then we did not find that further.  We did a dilating Alphatec retractor,   placed a disk erci and ESTHER blade, an excellent visualization of the interbody   space with muscle cleared away and no evidence of neural tissue.     L2-L3 lateral diskectomy and titanium interbody cage placement:  Using the   [employer] surface technology cages and instruments, made a large annulotomy,   increasing size pituitary rasps, rongeurs and curettes, performed a complete   diskectomy of the contralateral side, cracking the annulus.  We trialled out a   55 mm wide by 8 degree  high x15-degree lordotic cage and this had a nice   tight snug fit and gave us additional lordosis for stage II and compression,   which was the goal of the surgery, the apposition endplate was excellent.     L2-L3 lateral interbody/allograft arthrodesis and fusion:  For introduction of   the graft, this was packed with Alpha graft cellular bone matrix.  These   grafts were designed with surface technology cause direct binding vertebral   body above and below helping to assure fusion.     L2-L3 lateral plating fixation:  Using 1 up, 1 down integrated plate on the   cage, we all parallel to the endplates confirmed bony confines with a Ramsey   probe and placed 5.5x55 mm screws.  These had a good bony purchase.  We   tightened them down over the  locking nut to prevent backing out   the screws.  We were happy with the final construct.     We copiously irrigated with bacitracin infused saline, withdrew the disk eric   and ESTHER blade, laid down fibrillar in our path, slowly withdrew the retractor,   assured hemostasis, closed the wound in anatomic layers, 0 Vicryl for the   fascia, 2-0 Vicryls for the dermis and Steri-Strips for the skin.  Sterile   dressing was applied.  The patient was rolled back into the supine position to   be transferred to the prone bed for stage II.     There were no complications.  Needle and sponge count correct at the end of   the case.        ______________________________  MD RHEA Malloy III/HEATHER/POLINA    DD:  03/05/2024 17:40  DT:  03/05/2024 18:11    Job#:  [id number]

## 2024-03-06 NOTE — CARE PLAN
The patient is Stable - Low risk of patient condition declining or worsening    Shift Goals  Clinical Goals: pain control, safety  Patient Goals: pain control, sleep  Family Goals: comfort, sleep    Progress made toward(s) clinical / shift goals:    Problem: Pain - Standard  Goal: Alleviation of pain or a reduction in pain to the patient’s comfort goal  Outcome: Progressing     Problem: Knowledge Deficit - Standard  Goal: Patient and family/care givers will demonstrate understanding of plan of care, disease process/condition, diagnostic tests and medications  Outcome: Progressing       Patient is not progressing towards the following goals:

## 2024-03-06 NOTE — CARE PLAN
The patient is Stable - Low risk of patient condition declining or worsening    Shift Goals  Clinical Goals: pain control; mobility  Patient Goals: pain control; mobility  Family Goals: [place]omfort    Progress made toward(s) clinical / shift goals:    Problem: Pain - Standard  Goal: Alleviation of pain or a reduction in pain to the patient’s comfort goal  Outcome: Progressing     Problem: Knowledge Deficit - Standard  Goal: Patient and family/care givers will demonstrate understanding of plan of care, disease process/condition, diagnostic tests and medications  Outcome: Progressing     Problem: Urinary Elimination  Goal: Establish and maintain regular urinary output  Outcome: Progressing       Patient is not progressing towards the following goals:

## 2024-03-06 NOTE — THERAPY
Physical Therapy   Initial Evaluation     Patient Name: Luis Carlos Singh  Age:  48 y.o., Sex:  male  Medical Record #: [id number]  Today's Date: 3/6/2024     Precautions  Precautions: Fall Risk;Spinal / Back Precautions   Comments: no brace    Assessment  Patient is 48 y.o. male presenting with causa equina syndrome. Pt is now s/p stage #1 L2-3 XLIF then stage #2 L3-4 exploration of fusion and b/l redo L2-3 both on 3/5. Pt with PMH including asthma, chronic back pain. Pt is independent with functional mobility at baseline using no AD. Lives with wife who can help at home as needed. During current session, pt presents near functional baseline requiring overall SBA-SPV for mobility as detailed below. Able to walk 100 ft with FWW, no LOB. Pt self-limited gait distance but anticipate that endurance will improve with continued OOB mobility with nursing. 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 Pt was receptive to spinal prx edu and demo'ed good log roll. Recommend d/c home with FWW and OPPT. Pt denies any mobility concerns with d/c'ing home. Patient will not be actively followed for physical therapy services at this time, however may be seen if requested by physician for 1 more visit within 30 days to address any discharge or equipment needs.    Plan    Physical Therapy Initial Treatment Plan   Duration: Discharge Needs Only    DC Equipment Recommendations: Front-Wheel Walker (order initiated)  Discharge Recommendations: Recommend outpatient physical therapy services to address higher level deficits       Subjective    Pt received resting in bed, agreeable to participate.      Objective       03/06/24 0848   Initial Contact Note    Initial Contact Note Order Received and Verified, Evaluation Only - Patient Does Not Require Further Acute Physical Therapy at this Time.  However, May Benefit from Post Acute Therapy for Higher Level Functional Deficits.   Precautions    Precautions Fall Risk;Spinal / Back Precautions    Comments no brace   Vitals   O2 Delivery Device None - Room Air   Pain 0 - 10 Group   Therapist Pain Assessment During Activity;Nurse Notified  (c/o b/l hip pain with mobility)   Prior Living Situation   Prior Services Home-Independent   Housing / Facility 2 Story House   Steps Into Home 1   Steps In Home   (FOS, however pt plans to live exclusively downstairs with access to recliner for sleeping and full bathroom with shower)   Equipment Owned None   Lives with - Patient's Self Care Capacity Spouse   Comments Lives in [place] with wife who can help at home as needed   Prior Level of Functional Mobility   Bed Mobility Independent   Transfer Status Independent   Ambulation Independent   Ambulation Distance community   Assistive Devices Used None   Stairs Independent   Cognition    Cognition / Consciousness WDL   Level of Consciousness Alert   Comments pleasant and cooperative, receptive to edu   Passive ROM Lower Body   Passive ROM Lower Body WDL   Active ROM Lower Body    Active ROM Lower Body  WDL   Strength Lower Body   Lower Body Strength  WDL   Comments BLE WFL, L>R LE slight weakness (3+/5) which pt endorsed at baseline. Pt reported resolution of RLE weakness post-op   Sensation Lower Body   Comments c/o pain and numbness at b/l hips   Coordination Lower Body    Coordination Lower Body  WDL   Comments slow but WFL   Balance Assessment   Sitting Balance (Static) Fair +   Sitting Balance (Dynamic) Fair +   Standing Balance (Static) Fair   Standing Balance (Dynamic) Fair   Weight Shift Sitting Fair   Weight Shift Standing Fair   Comments FWW   Bed Mobility    Supine to Sit Supervised   Scooting Supervised   Rolling Supervised   Comments HOBE (pt endorsed recliner for sleeping at home), up in recliner post   Gait Analysis   Gait Level Of Assist Standby Assist   Assistive Device Front Wheel Walker   Distance (Feet) 100   # of Times Distance was Traveled 1    Deviation Bradykinetic   # of Stairs Climbed 0   Comments pt self-limited distance d/t fatigue   Functional Mobility   Sit to Stand Supervised   Bed, Chair, Wheelchair Transfer Supervised   Transfer Method Stand Step   6 Clicks Assessment - How much HELP from from another person do you currently need... (If the patient hasn't done an activity recently, how much help from another person do you think he/she would need if he/she tried?)   Turning from your back to your side while in a flat bed without using bedrails? 4   Moving from lying on your back to sitting on the side of a flat bed without using bedrails? 4   Moving to and from a bed to a chair (including a wheelchair)? 4   Standing up from a chair using your arms (e.g., wheelchair, or bedside chair)? 4   Walking in hospital room? 3   Climbing 3-5 steps with a railing? 3   6 clicks Mobility Score 22   Education Group   Education Provided Role of Physical Therapist;Spine Precautions   Spine Precautions Patient Response Patient;Significant Other;Acceptance;Explanation;Demonstration;Handout;Verbal Demonstration;Action Demonstration   Role of Physical Therapist Patient Response Patient;Significant Other;Acceptance;Explanation;Demonstration;Verbal Demonstration;Action Demonstration   Physical Therapy Initial Treatment Plan    Duration Discharge Needs Only   Problem List    Problems None   Anticipated Discharge Equipment and Recommendations   DC Equipment Recommendations Front-Wheel Walker  (order initiated)   Discharge Recommendations Recommend outpatient physical therapy services to address higher level deficits   Interdisciplinary Plan of Care Collaboration   IDT Collaboration with  Nursing;Family / Caregiver   Patient Position at End of Therapy Seated;Call Light within Reach;Tray Table within Reach;Phone within Reach;Family / Friend in Room   Collaboration Comments RN updated, wife at bedside   Session Information   Date / Session Number  3/6- d/c needs only

## 2024-03-06 NOTE — PROGRESS NOTES
Neurosurgery Progress Note    Subjective:  48 year old male presenting with symptoms of cauda equina syndrome.  He was previously seen in clinic and tentatively scheduled for L2/3 XLIF with laminectomy and posterior fusion. He presents with worsening right lower extremity pain and weakness as well as groin numbness and urinary retention. He has hx of solid bony fusion from L3-S1.     POD#1 left L2/3 XLIF with laminectomy and L2-4 fusion  Patient doing great  Tolerable back pain  Right leg pain resolved  Left leg pain improved  Ambualting, voiding  Drain with 120cc overnight   No saddle anesthesia     Exam:  Pleasant and cooperative   Drain in  Dressings clean  Motor 5/5  Sensory intact       BP  Min: 110/93  Max: 182/98  Pulse  Av  Min: 86  Max: 122  Resp  Avg: 15.2  Min: 12  Max: 21  Temp  Av.8 °C (98.2 °F)  Min: 36.3 °C (97.3 °F)  Max: 37.4 °C (99.3 °F)  SpO2  Av.5 %  Min: 93 %  Max: 99 %    No data recorded    Recent Labs     24  1434 24  2351   WBC 11.3* 9.4   RBC 6.34* 6.27*   HEMOGLOBIN 18.1* 17.9   HEMATOCRIT 53.9* 53.9*   MCV 85.0 86.0   MCH 28.5 28.5   MCHC 33.6 33.2   RDW 37.1 37.7   PLATELETCT 300 327   MPV 10.6 10.9     Recent Labs     24  2351 24  0453 24  0659   SODIUM 132* 134* 140   POTASSIUM 5.8* 5.3 4.3   CHLORIDE 99 101 104   CO2 21 23 27   GLUCOSE 153* 154* 124*   BUN 14 12 10   CREATININE 1.17 1.06 0.91   CALCIUM 9.3 9.1 8.6     Recent Labs     24  1434   APTT 28.8   INR 1.04           Intake/Output                         24 - 24 0659 24 07 - 24 0659     1398-8993 5488-6877 Total 7381-5905 1650-9480 Total                 Intake    I.V.  1800  -- 1800  --  -- --    Volume (mL) (Lactated Ringers) 1800 -- 1800 -- -- --    Total Intake 1800 -- 1800 -- -- --       Output    Urine  450  2180 2630  --  -- --    Urine 450 -- 450 -- -- --    Number of Times Voided -- 2 x 2 x -- -- --    Urine Void (mL) -- 2180 -- --  --    Drains  35  170 205  70  -- 70    Output (mL) (Closed/Suction Drain 1 Posterior Back Chadd Bhakta) 35 170 205 70 -- 70    Blood  200  -- 200  --  -- --    Est. Blood Loss 200 -- 200 -- -- --    Total Output 685 2350 3035 70 -- 70       Net I/O     1115 -2350 -1235 -70 -- -70              Intake/Output Summary (Last 24 hours) at 3/6/2024 1104  Last data filed at 3/6/2024 1033  Gross per 24 hour   Intake 1800 ml   Output 3105 ml   Net -1305 ml             Nozin nasal  swab  1 Applicator BID    Pharmacy Consult Request  1 Each PHARMACY TO DOSE    MD ALERT...DO NOT ADMINISTER NSAIDS or ASPIRIN unless ORDERED By Neurosurgery  1 Each PRN    docusate sodium  100 mg BID    senna-docusate  1 Tablet Nightly    senna-docusate  1 Tablet Q24HRS PRN    polyethylene glycol/lytes  1 Packet BID PRN    magnesium hydroxide  30 mL QDAY PRN    bisacodyl  10 mg Q24HRS PRN    sodium phosphate  1 Each Once PRN    NS   Continuous    enoxaparin (LOVENOX) injection  40 mg DAILY AT 1800    HYDROcodone-acetaminophen  1 Tablet Q4HRS PRN    HYDROcodone/acetaminophen  1 Tablet Q4HRS PRN    oxyCODONE immediate-release  5 mg Q4HRS PRN    Or    oxyCODONE immediate-release  10 mg Q4HRS PRN    HYDROmorphone  0.5 mg Q3HRS PRN    ceFAZolin  2 g Q8HR    diphenhydrAMINE  25 mg Q6HRS PRN    Or    diphenhydrAMINE  25 mg Q6HRS PRN    ondansetron  4 mg Q4HRS PRN    ondansetron  4 mg Q4HRS PRN    promethazine  12.5-25 mg Q4HRS PRN    promethazine  12.5-25 mg Q4HRS PRN    prochlorperazine  5-10 mg Q4HRS PRN    benzocaine-menthol  1 Lozenge Q2HRS PRN    calcium carbonate  500 mg BID    vitamin D3  5,000 Units DAILY    methocarbamol (Robaxin) 1,000 mg in  mL IVPB  1,000 mg Q8HRS    methocarbamol  750 mg Q8HRS PRN    albuterol  2 Puff Q4HRS PRN    gabapentin  400 mg TID    acetaminophen  650 mg Q6HRS    Followed by    [START ON 3/9/2024] acetaminophen  650 mg Q6HRS PRN    dexamethasone  4 mg Q6HRS    lidocaine  1 Patch Q24HR    labetalol  10  mg Q4HRS PRN    ipratropium-albuterol  3 mL Q4H PRN (RT)       Assessment and Plan:  Hospital day #3  POD#1 L2/3 XLIF with L2 laminectomy and extension of fusion to L2  PT/OT  Ice the back often  Watch drain   Hopeful home tomorrow      Chemical prophylactic DVT therapy: No  Start date/time: tomorrow 3/6

## 2024-03-06 NOTE — OP REPORT
DATE OF SERVICE:  03/05/2024     PREOPERATIVE DIAGNOSES:  1.  Junctional spondylosis.  2.  Junctional lumbar stenosis.  3.  Cauda equina syndrome.  4.  Status post L3-S1 laminectomy and fusion with another practitioner.  5.  Status post L2-L3 XLIF by myself earlier today.     POSTOPERATIVE DIAGNOSES:  1.  Junctional spondylosis.  2.  Junctional lumbar stenosis.  3.  Cauda equina syndrome.  4.  Status post L3-S1 laminectomy and fusion with another practitioner.  5.  Status post L2-L3 XLIF by myself earlier today.     PROCEDURES PERFORMED:  1.  L3-L4 exploration of fusion.  2.  Stealth guidance for the spine.  3.  Hip bone marrow autograft aspirate via separate incision.  4.  Bilateral L2 stealth-guided pedicle screw fixation.  5.  Bilateral redo L3 laminotomies, L2 laminectomy.  6.  Bilateral L3 transpedicular approaches for diskectomy.  7.  L2, L3, L4 posterolateral allograft autograft meri arthrodesis and fusion.  8.  Intraoperative monitoring.     SURGEON:  Sandro Lauren III, MD     ASSISTANT:  Christin Bernal PA-C     ANESTHESIA:  General.     ESTIMATED BLOOD LOSS:  150 mL     FINDINGS:  Solid bony fixation, good fusion noted at L3-L4, fusion extended   well and thecal sac patulously decompressed with transpedicular approaches.     COMPLICATIONS:  None.     DRAINS LEFT:  Subfascial YAZ.     DISPOSITION:  Extubated to recovery and to the floor.     HISTORY OF PRESENT ILLNESS:  This man had undergone an XLIF for lordotic   correction for his cauda equina syndrome and previous L3-S1 fusion with   somewhat loss of lordosis and did well with the XLIF and is now here flipped   into the prone position for his posterior decompression and extension of   fusion. I explained the risks and benefits from previous.  He understood them   all.     SUMMARY OF OPERATIVE PROCEDURE:  He had been stable during the stage I and was   rolled into the prone position, on a chest, hip, thigh Chadd table, arms up   in superman position,  all padded pressure points secured.  X-ray fluoroscopy   was brought into localize a slightly longer incision up to L2 as well as a   separate incision for the right PSIS Stealth post.  These were infiltrated   both infiltrated with Marcaine and epinephrine.  Preoperative antibiotics were   given.  Proper timeout was performed.  The patient was prepped and draped in   sterile fashion.     A linear incision was made and soft tissues dissected with bipolar and   monopolar electrocautery.  We found the dorsal fascia, incised it sharply and   using subperiosteal technique stripped the muscles off the scarred-in area of   L3 and L4 and now to the L2 transverse process, careful to spare the L1-L2   facet.  We advanced our retractors, infiltrated muscle with Marcaine, verified   levels with x-ray.     Exploration of L3, L4, fusion:  Using a Kocher, we grabbed the meri and noted   the screws were very solid in L3.  We found the posterolateral fusion in the   spaces.  We actually had to drill some of that out in order to do a   side-to-side connector between L3 screw and the crosslink from L3-L4.  We   deemed this to be a solid fusion, we just wanted to extend it and connect up   to the level below and level above.     Stealth guidance for the spine with hip bone marrow autograft aspirate via   separate incision:  We made a small stab incision over the right PSIS,   dissecting the bony prominence at appropriate angle, placed a small trocar and   aspirated out 60 mL of stem cell rich bone marrow blood for use later.  This   spun down with special centrifuge yielding a 9 mL mesenchymal stem cell slurry   for use for autograft.  At same angle, we placed [employer] O-arm reference   frame pin into the patient, draped the patient appropriately, ran an O-arm   spin and all registration points for the screws were excellent.     Bilateral L2 stealth-guided pedicle screw fixation:  Using the [employer]phate   stealth-guided screws and  Sara Campbell O-arm instruments, made a small   corticectomy guided by the reference frame, all under Stealth, undertapped   under Stealth and noting the firm bony fixation, we placed a 6.5x50 mm screw   on the left and a 7.5x50 mm screw on the right.  We stimulated the screws and   the stimulation parameters were less than 20 milliamps.  We were happy with   this fixation.     Bilateral redo L3 laminotomies, L2 laminectomy:  We removed the spinous process   of left of L3 and of L2 and morcellized for autograft later.  Using Midas Bjorn   drill, drilled off the lamina widely of L2 and L3.  Sweeping with the [employer]   dental, we encountered some scar inferior to where the critical points of   stenosis was, but we did repeat decompression of L3 with Kerrison rongeurs and   undercut L1 for any junctional stenosis and did a laminectomy at L2 as well.    We then turned our attention to transpedicular approaches for additional   nerve root decompression.     Bilateral L3 transpedicular approaches, facetectomy:  This required additional   level of skill, risks and expertise to do so without destabilizing the spine,   exposing the screws justifying 59 modifier coding.  With my assistant   retracting on thecal sac, we drilled along the pedicles bilaterally of L3, we   released the facet joint.  This gave us additional decompression.  Using nerve   root retractor on the shoulder and nerve root, we bipolared back disk and   took out a large piece of disk on the left side for symptom relief.  We   bipolared back bleeding epidural veins.     L2, L3, L4 posterolateral allograft autograft meri arthrodesis and fusion:  We   decorticated the transverse process, lateral facets of L2, L3, L4 and laid the   mixture of patient's morselized autograft and bone chip allograft soaking in   bone marrow aspirate autograft for onlay fusion arthrodesis, supplemented with   a 5.5 lordotic titanium meri.  We used W connectors going medially between the    L3 screw and the crosslink for another construct and then performed   compression on the right and distraction on the left to straighten out the   scoliosis.  We tightened everything down with the 's locking nuts   and torque devices.  We did a crosslink. We were happy with the final   construct.     We copiously irrigated with bacitracin-infused saline, laid down vancomycin   powder, tunneled a 7.5 fluted YAZ and secured to skin with stitch.  Closed the   wound in anatomic layers with 0 Vicryl for muscle, 0 Vicryl for the fascia,   2-0 Vicryls to the dermis and staples for the skin.  Separate PSIS incision   was closed similarly.  The patient was rolled from prone to supine, Frederick   removed and extubated.     There were no complications.  Needle and sponge count correct at the end of   the case.        ______________________________  MD RHEA Malloy III/REFUGIO/POLINA    DD:  03/05/2024 17:45  DT:  03/05/2024 18:47    Job#:  [id number]

## 2024-03-06 NOTE — OR NURSING
Pt's VSS. Pt on 6L Oxymask sating greater than 92%. Pt A&Ox4. Pt denies nausea. Pt resting comfortably. Pt surgical dressings dry and intact. Pt returning to T302.

## 2024-03-06 NOTE — PROGRESS NOTES
Hospital Medicine Daily Progress Note    Date of Service  3/6/2024    Chief Complaint  Low back pain    Hospital Course  Luis Carlos Singh is a 48 y.o. male with history of asthma, chronic back pain for which he follows Dr. Lauren, who presented 3/4/2024 to ER at the referral neurosurgery [facility] for evaluation of lumbar radiculopathy with worsening right lower extremity pain, weakness and groin numbness, urinary retention with mild episode of urinary incontinence without bowel incontinence, with suspicion for cauda equina syndrome.  On evaluation, WBC was 11,300.  Electrolytes and renal function were normal.  LFTs were normal.  MRI showed severe canal stenosis at L2-3 with cauda equina impingement, with bilateral moderate to severe foraminal narrowing with impingement upon the exiting L2 nerves.  Neurosurgery was called, and recommended surgery for lateral interbody fusion with sagittal balance correction and lumbar laminectomy and fusion.  Patient was started on Decadron, along with analgesics.      Interval Problem Update  3/6/2024 - I reviewed the patient's chart today. Uneventful night. VSS. Afebrile. Saturating well on 6L O2 NC.  Patient underwent L2-L3 lateral discectomy and titanium interbody cage placement, lateral interbody/allograft arthrodesis and fusion, and lateral plating fixation yesterday 3/5/2024.    > I have personally seen and examined the patient today.  Postoperative pain so far adequately controlled, but has some pain on the left leg.  Right leg strength is much improved.  Passing gas but no bowel movement yet.  He is urinating without problems.  No chest pain or shortness of breath.  No nausea or vomiting.  Tolerating oral meals.    I personally reviewed all lab results mentioned above. Prior medical records from this institution and outside facilities were independently reviewed as noted. I also personally reviewed all ER physician and consultant recommendations and plans as documented  above. History was independently obtained by myself. I have discussed this patient's plan of care and discharge plan at IDT rounds today with Case Management, Nursing, Nursing leadership, and other members of the IDT team.    Consultants/Specialty  neurosurgery    Code Status  Full Code    Disposition  The patient is not medically cleared for discharge to home or a post-acute facility.      Discharge plan TBD.  Pending PT/OT evaluation.  I have placed the appropriate orders for post-discharge needs.    Review of Systems  ROS     Pertinent positives/negatives as mentioned above.     A complete review of systems was personally done by me. All other systems were negative.       Physical Exam  Temp:  [36.3 °C (97.3 °F)-37.4 °C (99.3 °F)] 36.9 °C (98.5 °F)  Pulse:  [] 107  Resp:  [12-21] 16  BP: (110-182)/() 117/88  SpO2:  [93 %-99 %] 95 %    Physical Exam  Vitals reviewed.   Constitutional:       General: He is not in acute distress.     Appearance: Normal appearance. He is obese. He is not toxic-appearing or diaphoretic.      Comments: Body mass index is 38.74 kg/m².   HENT:      Head: Normocephalic and atraumatic.      Right Ear: External ear normal.      Left Ear: External ear normal.      Mouth/Throat:      Mouth: Mucous membranes are moist.      Pharynx: No oropharyngeal exudate.   Eyes:      General: No scleral icterus.     Extraocular Movements: Extraocular movements intact.      Conjunctiva/sclera: Conjunctivae normal.      Pupils: Pupils are equal, round, and reactive to light.   Cardiovascular:      Rate and Rhythm: Normal rate and regular rhythm.      Heart sounds: Normal heart sounds. No murmur heard.     No gallop.   Pulmonary:      Effort: Pulmonary effort is normal. No respiratory distress.      Breath sounds: Normal breath sounds. No stridor. No wheezing, rhonchi or rales.   Chest:      Chest wall: No tenderness.   Abdominal:      General: Bowel sounds are normal. There is no distension.       Palpations: Abdomen is soft. There is no mass.      Tenderness: There is no abdominal tenderness. There is no guarding or rebound.   Musculoskeletal:         General: No swelling. Normal range of motion.      Cervical back: Normal range of motion and neck supple.      Right lower leg: No edema.      Left lower leg: No edema.   Lymphadenopathy:      Cervical: No cervical adenopathy.   Skin:     General: Skin is warm and dry.      Coloration: Skin is not jaundiced.      Findings: No rash.   Neurological:      General: No focal deficit present.      Mental Status: He is alert and oriented to person, place, and time.      Cranial Nerves: No cranial nerve deficit.   Psychiatric:         Mood and Affect: Mood normal.         Behavior: Behavior normal.         Thought Content: Thought content normal.         Judgment: Judgment normal.         Fluids    Intake/Output Summary (Last 24 hours) at 3/6/2024 1239  Last data filed at 3/6/2024 1033  Gross per 24 hour   Intake 1800 ml   Output 3105 ml   Net -1305 ml       Laboratory  Recent Labs     03/04/24  1434 03/04/24  2351   WBC 11.3* 9.4   RBC 6.34* 6.27*   HEMOGLOBIN 18.1* 17.9   HEMATOCRIT 53.9* 53.9*   MCV 85.0 86.0   MCH 28.5 28.5   MCHC 33.6 33.2   RDW 37.1 37.7   PLATELETCT 300 327   MPV 10.6 10.9     Recent Labs     03/04/24  2351 03/05/24  0453 03/06/24  0659   SODIUM 132* 134* 140   POTASSIUM 5.8* 5.3 4.3   CHLORIDE 99 101 104   CO2 21 23 27   GLUCOSE 153* 154* 124*   BUN 14 12 10   CREATININE 1.17 1.06 0.91   CALCIUM 9.3 9.1 8.6     Recent Labs     03/04/24  1434   APTT 28.8   INR 1.04               Imaging  DX-O-ARM   Final Result      Portable O-arm utilized for 4 seconds.         INTERPRETING LOCATION: [address] [place], [place] [address], [id number]      DX-LUMBAR SPINE-2 OR 3 VIEWS   Final Result      Portable fluoroscopy as described.      DX-PORTABLE FLUORO > 1 HOUR   Final Result      Portable fluoroscopy utilized for 1 minute 40 seconds.         INTERPRETING LOCATION: [facility]  [facility], [address]      MR-LUMBAR SPINE-W/O   Final Result         Severe canal stenosis at L2-3 with cauda equina impingement, unchanged from the prior examination. There is bilateral moderate-to-severe foraminal narrowing with impingement upon the exiting L2 nerves.      Postoperative changes with well decompressed spinal canal at the L4-5, L5-S1 level.      Bilateral moderate foraminal narrowing at L5-S1, stable from previous examination.              Assessment/Plan  * Cauda equina syndrome (HCC)- (present on admission)  Assessment & Plan  - MRI showed severe canal stenosis at L2-3 with cauda equina impingement, with bilateral moderate to severe foraminal narrowing with impingement upon the exiting L2 nerves.    - now s/p L2-L3 lateral discectomy and titanium interbody cage placement, lateral interbody/allograft arthrodesis and fusion, and lateral plating fixation 3/5/2024  -Off IV Decadron. Continue scheduled Robaxin.  Continue as needed oral Norco and IV Dilaudid.  -Placed LSO brace.  -Postoperative PT/OT evaluation pending.    Hyperkalemia  Assessment & Plan  - Resolved.  Continue to monitor.  BMP in the morning    Lumbar radiculopathy, acute- (present on admission)  Assessment & Plan  - Management as above.    Class 2 obesity in adult- (present on admission)  Assessment & Plan  - Body mass index is 38.74 kg/m²..  -  on weight loss.  - outpatient referral for outpatient weight management.      Neuropathy- (present on admission)  Assessment & Plan  -Continue gabapentin    Hypogonadotropic hypogonadism (HCC)- (present on admission)  Assessment & Plan  - Per history  -Was on testosterone  -Continue outpatient follow-up    Asthma-chronic obstructive pulmonary disease overlap syndrome- (present on admission)  Assessment & Plan  - Not in acute exacerbation.  Not hypoxic.  No wheezing on exam.  -Continue respiratory support, as needed bronchodilators.  Keep saturations above 88%.         VTE prophylaxis:  SCD    My total time spent caring for the patient on the day of the encounter was 51 minutes. This does not include time spent on separately billable procedures/tests.

## 2024-03-06 NOTE — THERAPY
Occupational Therapy   Initial Evaluation     Patient Name: Luis Carlos Singh  Age:  48 y.o., Sex:  male  Medical Record #: [id number]  Today's Date: 3/6/2024     Precautions: Fall Risk, Spinal / Back Precautions   Comments: no brace per chart    Assessment  Patient is 48 y.o. male admitted for worsening LBP, hx of DDD w/previous surgical repair, otherwise independent w/ADL's and no use of AD.   This admission pt is dx w/lumbar spondylosis cauda equina syndrome. Pt is POD#1 lateral left L2-3 fusion, posterior redo bilateral L3 lami L2 lami and Lumbar 2-4 fusion w/hip bone marrow autograft aspirate.   Pt reports pain but improved strength and today demonstrated ability to complete ADL's and txfs w/o assist using fww. Pt has good support at dc. OT reviewed spinal prec and modifications for pain control and ADL's using AE.     Plan  Occupational Therapy Initial Treatment Plan   Duration: Evaluation only    DC Equipment Recommendations: None  Discharge Recommendations: Anticipate that the patient will have no further occupational therapy needs after discharge from the hospital     Subjective  Agreeable to therapy      Objective     03/06/24 1054   Charge Group   OT Evaluation OT Evaluation Low   OT Self Care / ADL (Units) 1   Total Time Spent   OT Time Spent Yes   OT Self Care / ADL (Minutes) 15   OT Evaluation (Minutes) 12   OT Total Time Spent (Calculated) 27   Initial Contact Note    Initial Contact Note Order Received and Verified, Evaluation Only - Patient Does Not Require Further Acute Occupational Therapy at this Time.  However, May Benefit from Post Acute Therapy for Higher Level Functional Deficits.   Prior Living Situation   Prior Services Home-Independent   Housing / Facility 2 Story House   Steps Into Home 1   Bathroom Set up Walk In Shower;Shower Chair   Equipment Owned None   Lives with - Patient's Self Care Capacity Spouse   Comments So present and supportive resides in [place]   Prior Level of ADL  Function   Self Feeding Independent   Grooming / Hygiene Independent   Bathing Independent   Dressing Independent   Toileting Independent   Prior Level of IADL Function   Medication Management Independent   Laundry Independent   Kitchen Mobility Independent   Finances Independent   Home Management Independent   Shopping Independent   Prior Level Of Mobility Independent Without Device in Community   Driving / Transportation Driving Independent   Occupation (Pre-Hospital Vocational) Employed Full Time   Precautions   Precautions Fall Risk;Spinal / Back Precautions    Comments no brace per chart   Pain 0 - 10 Group   Location Back   Location Orientation Right;Left   Therapist Pain Assessment During Activity;Nurse Notified;8   Cognition    Cognition / Consciousness WDL   Level of Consciousness Alert   Passive ROM Upper Body   Passive ROM Upper Body WDL   Active ROM Upper Body   Active ROM Upper Body  WDL   Strength Upper Body   Upper Body Strength  WDL   Sensation Upper Body   Upper Extremity Sensation  WDL   Upper Body Muscle Tone   Upper Body Muscle Tone  WDL   Neurological Concerns   Neurological Concerns No   Coordination Upper Body   Coordination WDL   Balance Assessment   Sitting Balance (Static) Fair +   Sitting Balance (Dynamic) Fair   Standing Balance (Static) Fair   Standing Balance (Dynamic) Fair   Weight Shift Sitting Fair   Weight Shift Standing Fair   Comments w/fww   Bed Mobility    Comments up in chair pre/post   ADL Assessment   Grooming Supervision;Standing   Upper Body Dressing Supervision   Lower Body Dressing Supervision   Toileting Supervision   Comments previewed use of AE for LB dressing as well as adaptions for cammie care and shower safety   How much help from another person does the patient currently need...   6 Clicks Daily Activity Score 24   Functional Mobility   Sit to Stand Supervised   Bed, Chair, Wheelchair Transfer Supervised   Toilet Transfers Supervised   Mobility walking in room w/fww    Activity Tolerance   Comments limited by pain   Education Group   Education Provided Role of Occupational Therapist;Activities of Daily Living;Adaptive Equipment   Role of Occupational Therapist Patient Response Patient;Acceptance;Explanation;Demonstration   ADL Patient Response Patient;Acceptance;Explanation;Demonstration   Adaptive Equipment Patient Response Patient;Acceptance;Explanation;Demonstration   Occupational Therapy Initial Treatment Plan    Duration Evaluation only   Problem List   Problem List None   Anticipated Discharge Equipment and Recommendations   DC Equipment Recommendations None   Discharge Recommendations Anticipate that the patient will have no further occupational therapy needs after discharge from the hospital   Interdisciplinary Plan of Care Collaboration   IDT Collaboration with  Nursing   Patient Position at End of Therapy Seated;Call Light within Reach;Tray Table within Reach;Phone within Reach;Family / Friend in Room   Collaboration Comments RN aware of OT eval and pts efforts   Session Information   Date / Session Number  3/6 #1 eval only

## 2024-03-06 NOTE — OR SURGEON
Immediate Post OP Note    PreOp Diagnosis: lumbar spondylosis, cauda equina syndrome      PostOp Diagnosis: same      Procedure(s):  LEFT LATERAL LUMBAR 2-3 LATERAL INTERBODY FUSION - Wound Class: Clean  POSTERIOR REDO BILATERAL LUMBAR 3 LAMINOTOMY, LUMBAR 2 LAMINECTOMY, STEALTH LUMBAR 2-4 FUSION, HIP BONE MARROW AUTOGRAFT ASPIRATE AND CONCENTRATE - Wound Class: Clean with Drain    Surgeon(s):  Sandro Lauren III, M.D.    Anesthesiologist/Type of Anesthesia:  Anesthesiologist: Abel Su M.D./General    Surgical Staff:  Assistant: Christin Bernal P.A.-C.  Circulator: Radha Fleming R.N.  Relief Circulator: Makayla Yeung R.N.; Felipe Juares R.N.  Scrub Person: Jac Wynne; Soham Hinojosa  Radiology Technologist: Connie Bonilla    Specimens removed if any:  * No specimens in log *    Estimated Blood Loss: 100ml    Findings: lumbar spondylosis, went well, see full op report     Complications: none         3/5/2024 5:37 PM Christin Bernal P.A.-C.

## 2024-03-06 NOTE — ANESTHESIA TIME REPORT
Anesthesia Start and Stop Event Times       Date Time Event    3/5/2024 1036 Ready for Procedure     1231 Anesthesia Start     1746 Anesthesia Stop          Responsible Staff  03/05/24      Name Role Begin End    Abel Su M.D. Anesth 1231 1749          Overtime Reason:  no overtime (within assigned shift)    Comments:

## 2024-03-07 PROCEDURE — 99233 SBSQ HOSP IP/OBS HIGH 50: CPT | Performed by: INTERNAL MEDICINE

## 2024-03-07 PROCEDURE — 700111 HCHG RX REV CODE 636 W/ 250 OVERRIDE (IP): Performed by: PHYSICIAN ASSISTANT

## 2024-03-07 PROCEDURE — 770001 HCHG ROOM/CARE - MED/SURG/GYN PRIV*

## 2024-03-07 PROCEDURE — 700102 HCHG RX REV CODE 250 W/ 637 OVERRIDE(OP): Performed by: PHYSICIAN ASSISTANT

## 2024-03-07 PROCEDURE — 700102 HCHG RX REV CODE 250 W/ 637 OVERRIDE(OP): Performed by: STUDENT IN AN ORGANIZED HEALTH CARE EDUCATION/TRAINING PROGRAM

## 2024-03-07 PROCEDURE — A9270 NON-COVERED ITEM OR SERVICE: HCPCS | Performed by: PHYSICIAN ASSISTANT

## 2024-03-07 PROCEDURE — 94664 DEMO&/EVAL PT USE INHALER: CPT

## 2024-03-07 PROCEDURE — A9270 NON-COVERED ITEM OR SERVICE: HCPCS | Performed by: STUDENT IN AN ORGANIZED HEALTH CARE EDUCATION/TRAINING PROGRAM

## 2024-03-07 RX ADMIN — CHOLECALCIFEROL TAB 125 MCG (5000 UNIT) 5000 UNITS: 125 TAB at 04:51

## 2024-03-07 RX ADMIN — HYDROCODONE BITARTRATE AND ACETAMINOPHEN 1 TABLET: 5; 325 TABLET ORAL at 00:19

## 2024-03-07 RX ADMIN — HYDROCODONE BITARTRATE AND ACETAMINOPHEN 1 TABLET: 10; 325 TABLET ORAL at 04:50

## 2024-03-07 RX ADMIN — ANTACID TABLETS 500 MG: 500 TABLET, CHEWABLE ORAL at 04:51

## 2024-03-07 RX ADMIN — GABAPENTIN 400 MG: 400 CAPSULE ORAL at 17:49

## 2024-03-07 RX ADMIN — GABAPENTIN 400 MG: 400 CAPSULE ORAL at 04:51

## 2024-03-07 RX ADMIN — ALBUTEROL SULFATE 2 PUFF: 90 AEROSOL, METERED RESPIRATORY (INHALATION) at 16:10

## 2024-03-07 RX ADMIN — HYDROCODONE BITARTRATE AND ACETAMINOPHEN 1 TABLET: 5; 325 TABLET ORAL at 13:53

## 2024-03-07 RX ADMIN — HYDROMORPHONE HYDROCHLORIDE 0.5 MG: 1 INJECTION, SOLUTION INTRAMUSCULAR; INTRAVENOUS; SUBCUTANEOUS at 11:49

## 2024-03-07 RX ADMIN — Medication 1 APPLICATOR: at 04:52

## 2024-03-07 RX ADMIN — Medication 1 APPLICATOR: at 17:56

## 2024-03-07 RX ADMIN — DOCUSATE SODIUM 100 MG: 100 CAPSULE, LIQUID FILLED ORAL at 17:48

## 2024-03-07 RX ADMIN — METHOCARBAMOL 750 MG: 750 TABLET ORAL at 09:09

## 2024-03-07 RX ADMIN — HYDROCODONE BITARTRATE AND ACETAMINOPHEN 1 TABLET: 10; 325 TABLET ORAL at 17:50

## 2024-03-07 RX ADMIN — HYDROCODONE BITARTRATE AND ACETAMINOPHEN 1 TABLET: 10; 325 TABLET ORAL at 09:09

## 2024-03-07 RX ADMIN — GABAPENTIN 400 MG: 400 CAPSULE ORAL at 11:43

## 2024-03-07 RX ADMIN — ACETAMINOPHEN 650 MG: 325 TABLET, FILM COATED ORAL at 11:43

## 2024-03-07 RX ADMIN — ANTACID TABLETS 500 MG: 500 TABLET, CHEWABLE ORAL at 17:49

## 2024-03-07 RX ADMIN — ACETAMINOPHEN 650 MG: 325 TABLET, FILM COATED ORAL at 17:48

## 2024-03-07 RX ADMIN — DOCUSATE SODIUM 100 MG: 100 CAPSULE, LIQUID FILLED ORAL at 04:51

## 2024-03-07 RX ADMIN — DOCUSATE SODIUM 50 MG AND SENNOSIDES 8.6 MG 1 TABLET: 8.6; 5 TABLET, FILM COATED ORAL at 20:32

## 2024-03-07 RX ADMIN — HYDROCODONE BITARTRATE AND ACETAMINOPHEN 1 TABLET: 10; 325 TABLET ORAL at 22:05

## 2024-03-07 RX ADMIN — ACETAMINOPHEN 650 MG: 325 TABLET, FILM COATED ORAL at 23:51

## 2024-03-07 ASSESSMENT — PAIN DESCRIPTION - PAIN TYPE
TYPE: ACUTE PAIN;SURGICAL PAIN
TYPE: SURGICAL PAIN

## 2024-03-07 ASSESSMENT — PAIN SCALES - GENERAL: PAIN_LEVEL: 6

## 2024-03-07 NOTE — PROGRESS NOTES
Neurosurgery Progress Note    Subjective:  48 year old male presenting with symptoms of cauda equina syndrome.  He was previously seen in clinic and tentatively scheduled for L2/3 XLIF with laminectomy and posterior fusion. He presents with worsening right lower extremity pain and weakness as well as groin numbness and urinary retention. He has hx of solid bony fusion from L3-S1.     POD#2 left L2/3 XLIF with laminectomy and L2-4 fusion  Patient doing great  Tolerable back pain  Right leg pain resolved  Left leg pain much improved  Ambualting, voiding, had BM  Drain with 50cc this morning    No saddle anesthesia     Exam:  Pleasant and cooperative   Drain in  Dressings clean  Motor 5/5  Sensory intact       BP  Min: 115/82  Max: 150/85  Pulse  Av.7  Min: 88  Max: 101  Resp  Av.5  Min: 16  Max: 17  Temp  Av.7 °C (98.1 °F)  Min: 36.2 °C (97.2 °F)  Max: 36.9 °C (98.5 °F)  SpO2  Av.2 %  Min: 92 %  Max: 96 %    No data recorded    Recent Labs     24  1434 24  2351   WBC 11.3* 9.4   RBC 6.34* 6.27*   HEMOGLOBIN 18.1* 17.9   HEMATOCRIT 53.9* 53.9*   MCV 85.0 86.0   MCH 28.5 28.5   MCHC 33.6 33.2   RDW 37.1 37.7   PLATELETCT 300 327   MPV 10.6 10.9     Recent Labs     24  2351 24  0453 24  0659   SODIUM 132* 134* 140   POTASSIUM 5.8* 5.3 4.3   CHLORIDE 99 101 104   CO2 21 23 27   GLUCOSE 153* 154* 124*   BUN 14 12 10   CREATININE 1.17 1.06 0.91   CALCIUM 9.3 9.1 8.6     Recent Labs     24  1434   APTT 28.8   INR 1.04           Intake/Output                         24 07 - 24 0659 24 07 - 24 0659     4507-0137 0312-0959 Total [id number] Total                 Intake    Total Intake -- -- -- -- -- --       Output    Drains  130  130 260  --  -- --    Output (mL) (Closed/Suction Drain 1 Posterior Back Chadd Bhakta) 130 130 260 -- -- --    Stool  --  -- --  --  -- --    Number of Times Stooled 1 x -- 1 x -- -- --    Total Output 130 130  260 -- -- --       Net I/O     -130 -130 -260 -- -- --              Intake/Output Summary (Last 24 hours) at 3/7/2024 0952  Last data filed at 3/7/2024 0400  Gross per 24 hour   Intake --   Output 260 ml   Net -260 ml             Nozin nasal  swab  1 Applicator BID    Pharmacy Consult Request  1 Each PHARMACY TO DOSE    MD ALERT...DO NOT ADMINISTER NSAIDS or ASPIRIN unless ORDERED By Neurosurgery  1 Each PRN    docusate sodium  100 mg BID    senna-docusate  1 Tablet Nightly    senna-docusate  1 Tablet Q24HRS PRN    polyethylene glycol/lytes  1 Packet BID PRN    magnesium hydroxide  30 mL QDAY PRN    bisacodyl  10 mg Q24HRS PRN    sodium phosphate  1 Each Once PRN    NS   Continuous    enoxaparin (LOVENOX) injection  40 mg DAILY AT 1800    HYDROcodone-acetaminophen  1 Tablet Q4HRS PRN    HYDROcodone/acetaminophen  1 Tablet Q4HRS PRN    oxyCODONE immediate-release  5 mg Q4HRS PRN    Or    oxyCODONE immediate-release  10 mg Q4HRS PRN    HYDROmorphone  0.5 mg Q3HRS PRN    diphenhydrAMINE  25 mg Q6HRS PRN    Or    diphenhydrAMINE  25 mg Q6HRS PRN    ondansetron  4 mg Q4HRS PRN    ondansetron  4 mg Q4HRS PRN    promethazine  12.5-25 mg Q4HRS PRN    promethazine  12.5-25 mg Q4HRS PRN    prochlorperazine  5-10 mg Q4HRS PRN    benzocaine-menthol  1 Lozenge Q2HRS PRN    calcium carbonate  500 mg BID    vitamin D3  5,000 Units DAILY    methocarbamol  750 mg Q8HRS PRN    albuterol  2 Puff Q4HRS PRN    gabapentin  400 mg TID    acetaminophen  650 mg Q6HRS    Followed by    [START ON 3/9/2024] acetaminophen  650 mg Q6HRS PRN    lidocaine  1 Patch Q24HR    labetalol  10 mg Q4HRS PRN    ipratropium-albuterol  3 mL Q4H PRN (RT)       Assessment and Plan:  Hospital day #4  POD#2 L2/3 XLIF with L2 laminectomy and extension of fusion to L2  PT/OT  Ice the back often  Watch drain further; not ready to come out   Home tomorrow      Chemical prophylactic DVT therapy: No  Start date/time: tomorrow 3/6

## 2024-03-07 NOTE — PROGRESS NOTES
Hospital Medicine Daily Progress Note    Date of Service  3/7/2024    Chief Complaint  Low back pain    Hospital Course  Luis Carlos Singh is a 48 y.o. male with history of asthma, chronic back pain for which he follows Dr. Lauren, who presented 3/4/2024 to ER at the referral neurosurgery PA for evaluation of lumbar radiculopathy with worsening right lower extremity pain, weakness and groin numbness, urinary retention with mild episode of urinary incontinence without bowel incontinence, with suspicion for cauda equina syndrome.  On evaluation, WBC was 11,300.  Electrolytes and renal function were normal.  LFTs were normal.  MRI showed severe canal stenosis at L2-3 with cauda equina impingement, with bilateral moderate to severe foraminal narrowing with impingement upon the exiting L2 nerves.  Neurosurgery was called, and recommended surgery for lateral interbody fusion with sagittal balance correction and lumbar laminectomy and fusion.  Patient was started on Decadron, along with analgesics. Patient underwent L2-L3 lateral discectomy and titanium interbody cage placement, lateral interbody/allograft arthrodesis and fusion, and lateral plating fixation yesterday 3/5/2024.  Presenting symptoms have improved postoperatively.      Interval Problem Update  3/7/2024 - I reviewed the patient's chart. There were no significant overnight events. Remains hemodynamically stable and afebrile.  He has been weaned off supplemental oxygen.  Electrolytes and renal function are normal.  PT/OT recommended outpatient therapy.    > I have personally seen and examined the patient today.  Pain is well-controlled.  However, still has significant drainage from the YAZ drain ~ 260 cc in the last 24 hours.  No abdominal complaints.  Still passing a lot of flatus.  No nausea, vomiting, abdominal pain.  No chest pain or shortness of breath.      I personally reviewed all lab results mentioned above. Prior medical records from this  institution and outside facilities were independently reviewed as noted. I also personally reviewed all ER physician and consultant recommendations and plans as documented above. History was independently obtained by myself. I have discussed this patient's plan of care and discharge plan at IDT rounds today with Case Management, Nursing, Nursing leadership, and other members of the IDT team.    Consultants/Specialty  neurosurgery    Code Status  Full Code    Disposition  The patient is not medically cleared for discharge to home or a post-acute facility.      PT/OT recommended outpatient therapy.  I have placed the appropriate orders for post-discharge needs.    Review of Systems  ROS     Pertinent positives/negatives as mentioned above.     A complete review of systems was personally done by me. All other systems were negative.       Physical Exam  Temp:  [36.7 °C (98.1 °F)-36.9 °C (98.5 °F)] 36.7 °C (98.1 °F)  Pulse:  [] 92  Resp:  [16-17] 16  BP: (116-150)/(66-85) 150/85  SpO2:  [93 %-96 %] 96 %    Physical Exam  Vitals reviewed.   Constitutional:       General: He is not in acute distress.     Appearance: Normal appearance. He is obese. He is not toxic-appearing or diaphoretic.      Comments: Body mass index is 38.74 kg/m².   HENT:      Head: Normocephalic and atraumatic.      Right Ear: External ear normal.      Left Ear: External ear normal.      Mouth/Throat:      Mouth: Mucous membranes are moist.      Pharynx: No oropharyngeal exudate.   Eyes:      General: No scleral icterus.     Extraocular Movements: Extraocular movements intact.      Conjunctiva/sclera: Conjunctivae normal.      Pupils: Pupils are equal, round, and reactive to light.   Cardiovascular:      Rate and Rhythm: Normal rate and regular rhythm.      Heart sounds: Normal heart sounds. No murmur heard.     No gallop.   Pulmonary:      Effort: Pulmonary effort is normal. No respiratory distress.      Breath sounds: Normal breath sounds. No  stridor. No wheezing, rhonchi or rales.   Chest:      Chest wall: No tenderness.   Abdominal:      General: Bowel sounds are normal. There is no distension.      Palpations: Abdomen is soft. There is no mass.      Tenderness: There is no abdominal tenderness. There is no guarding or rebound.   Musculoskeletal:         General: No swelling. Normal range of motion.      Cervical back: Normal range of motion and neck supple.      Right lower leg: No edema.      Left lower leg: No edema.      Comments: (+) YAZ drain in place with serosanguineous drainage   Lymphadenopathy:      Cervical: No cervical adenopathy.   Skin:     General: Skin is warm and dry.      Coloration: Skin is not jaundiced.      Findings: No rash.   Neurological:      General: No focal deficit present.      Mental Status: He is alert and oriented to person, place, and time.      Cranial Nerves: No cranial nerve deficit.   Psychiatric:         Mood and Affect: Mood normal.         Behavior: Behavior normal.         Thought Content: Thought content normal.         Judgment: Judgment normal.         Fluids    Intake/Output Summary (Last 24 hours) at 3/7/2024 1203  Last data filed at 3/7/2024 0930  Gross per 24 hour   Intake --   Output 260 ml   Net -260 ml       Laboratory  Recent Labs     03/04/24  1434 03/04/24  2351   WBC 11.3* 9.4   RBC 6.34* 6.27*   HEMOGLOBIN 18.1* 17.9   HEMATOCRIT 53.9* 53.9*   MCV 85.0 86.0   MCH 28.5 28.5   MCHC 33.6 33.2   RDW 37.1 37.7   PLATELETCT 300 327   MPV 10.6 10.9     Recent Labs     03/04/24  2351 03/05/24  0453 03/06/24  0659   SODIUM 132* 134* 140   POTASSIUM 5.8* 5.3 4.3   CHLORIDE 99 101 104   CO2 21 23 27   GLUCOSE 153* 154* 124*   BUN 14 12 10   CREATININE 1.17 1.06 0.91   CALCIUM 9.3 9.1 8.6     Recent Labs     03/04/24  1434   APTT 28.8   INR 1.04               Imaging  DX-O-ARM   Final Result      Portable O-arm utilized for 4 seconds.         INTERPRETING LOCATION: [address] [place] [address], [address]      DX-LUMBAR  SPINE-2 OR 3 VIEWS   Final Result      Portable fluoroscopy as described.      DX-PORTABLE FLUORO > 1 HOUR   Final Result      Portable fluoroscopy utilized for 1 minute 40 seconds.         INTERPRETING LOCATION: [address] [place] [address], [place], [id number]      MR-LUMBAR SPINE-W/O   Final Result         Severe canal stenosis at L2-3 with cauda equina impingement, unchanged from the prior examination. There is bilateral moderate-to-severe foraminal narrowing with impingement upon the exiting L2 nerves.      Postoperative changes with well decompressed spinal canal at the L4-5, L5-S1 level.      Bilateral moderate foraminal narrowing at L5-S1, stable from previous examination.              Assessment/Plan  * Cauda equina syndrome (HCC)- (present on admission)  Assessment & Plan  - MRI showed severe canal stenosis at L2-3 with cauda equina impingement, with bilateral moderate to severe foraminal narrowing with impingement upon the exiting L2 nerves.    - now s/p L2-L3 lateral discectomy and titanium interbody cage placement, lateral interbody/allograft arthrodesis and fusion, and lateral plating fixation 3/5/2024.  Still with significant output from YAZ drain, drain not ready to come out yet per neurosurgery.  Hold prophylactic Lovenox for now.  -Off IV Decadron. Continue scheduled Robaxin.  Continue as needed oral Norco and IV Dilaudid.  -Continue LSO brace.  -Outpatient skilled therapies per PT/OT recommendations.  Referrals placed.    Hyperkalemia  Assessment & Plan  - Resolved.  Continue to monitor.  BMP in the morning    Lumbar radiculopathy, acute- (present on admission)  Assessment & Plan  - Management as above.    Class 2 obesity in adult- (present on admission)  Assessment & Plan  - Body mass index is 38.74 kg/m²..  -  on weight loss.  - outpatient referral for outpatient weight management.      Neuropathy- (present on admission)  Assessment & Plan  -Continue gabapentin    Hypogonadotropic hypogonadism (HCC)- (present  on admission)  Assessment & Plan  - Per history  -Was on testosterone  -Continue outpatient follow-up    Asthma-chronic obstructive pulmonary disease overlap syndrome- (present on admission)  Assessment & Plan  - Not in acute exacerbation.  Not hypoxic.  No wheezing on exam.  -Continue respiratory support, as needed bronchodilators.  Keep saturations above 88%.         VTE prophylaxis: SCD    My total time spent caring for the patient on the day of the encounter was 50 minutes. This does not include time spent on separately billable procedures/tests.

## 2024-03-07 NOTE — ANESTHESIA POSTPROCEDURE EVALUATION
Patient: Luis Carlos Singh    Procedure Summary       Date: 03/05/24 Room / Location: [facility] OR 04 / SURGERY [facility]    Anesthesia Start: 1231 Anesthesia Stop: 1746    Procedures:       LEFT LATERAL LUMBAR 2-3 LATERAL INTERBODY FUSION (Left: Spine Lumbar)      POSTERIOR REDO BILATERAL LUMBAR 3 LAMINOTOMY, LUMBAR 2 LAMINECTOMY, STEALTH LUMBAR 2-4 FUSION, HIP BONE MARROW AUTOGRAFT ASPIRATE AND CONCENTRATE (Bilateral: Spine Lumbar) Diagnosis: (LUMBAR STENOSIS, LUMBAR SPONDYLOSIS)    Surgeons: Sandro Lauren III, M.D. Responsible Provider: Abel Su M.D.    Anesthesia Type: general ASA Status: 2            Final Anesthesia Type: general  Last vitals  BP   Blood Pressure: (!) 145/77    Temp   36.7 °C (98.1 °F)    Pulse   88   Resp   17    SpO2   95 %      Anesthesia Post Evaluation    Patient location during evaluation: PACU  Patient participation: complete - patient participated  Level of consciousness: awake and alert  Pain score: 6    Airway patency: patent  Anesthetic complications: no  Cardiovascular status: hemodynamically stable  Respiratory status: acceptable  Hydration status: euvolemic    PONV: none          There were no known notable events for this encounter.

## 2024-03-07 NOTE — RESPIRATORY CARE
"  COPD EDUCATION by COPD CLINICAL EDUCATOR  3/7/2024  at  9:06 AM by Opal Villalobos, RRT     Patient interviewed by education team.  Patient declined or is unable to participate in the full program.  Therefore, a short intervention has been conducted.  A comprehensive packet including information about COPD, types of treatments to manage their disease and safe home Oxygen usage was provided and reviewed with patient at the bedside.      COPD Screen  COPD Risk Screening  Do you have a history of COPD?: Yes  Do you have a Pulmonologist?: No    COPD Assessment  COPD Clinical Specialists ONLY  COPD Education Initiated: Yes--Short Intervention  Is this a COPD exacerbation patient?: No (Per chart - Pt with HX of COPD)  DME Company: None at this time per PT  Physician Name: Jackie Espinosa P.A.-C.  Pulmonologist Name: None at this time - per PT  Referrals Initiated: Yes  Pulmonary Rehab: N/A  Smoking Cessation:  (Pt reports no smoking history)  Hospice: N/A  Home Health Care:  (TBD - pending D/C - pts spouse does help him at home)  Mobile Urgent Care Services: N/A  $ Demo/Eval of SVN's, MDI's and Aerosols: Yes (Reviewed respriatory medications and proper use)  Interdisciplinary Rounds: Attendance at Rounds (30 Min)    PFT Results    No results found for: \"PFT\"    Meds to Beds  [facility] provides bedside medication delivery for all eligible patients at discharge.  Would you like to opt out of this program for any reason?: No - Stay Opted In     MY COPD ACTION PLAN     It is recommended that patients and physicians /healthcare providers complete this action plan together. This plan should be discussed at each physician visit and updated as needed.    The green, yellow and red zones show groups of symptoms of COPD. This list of symptoms is not comprehensive, and you may experience other symptoms. In the \"Actions\" column, your healthcare provider has recommended actions for you to take based on your symptoms.    Patient Name: " "Luis Carlos Singh   YOB: 1975   Last Updated on: 3/7/2024  9:02 AM   Green Zone:  I am doing well today Actions     Usual activitiy and exercise level   Take daily medications     Usual amounts of cough and phlegm/mucus   Use oxygen as prescribed     Sleep well at night   Continue regular exercise/diet plan     Appetite is good   At all times avoid cigarette smoke, inhaled irritants     Daily Medications (these medications are taken every day):                Yellow Zone:  I am having a bad day or a COPD flare Actions     More breathless than usual   Continue daily medications     I have less energy for my daily activities   Use quick relief inhaler as ordered     Increased or thicker phlegm/mucus   Use oxygen as prescribed     Using quick relief inhaler/nebulizer more often   Get plenty of rest     Swelling of ankles more than usual   Use pursed lip breathing     More coughing than usual   At all times avoid cigarette smoke, inhaled irritants     I feel like I have a \"chest cold\"     Poor sleep and my symptoms woke me up     My appetite is not good     My medicine is not helping      Call provider immediately if symptoms don’t improve     Continue daily medications, add rescue medications:   Albuterol 2 Puffs Every 4 hours PRN       Medications to be used during a flare up, (as Discussed with Provider):           Additional Information:  Use as directed and with spacer     Red Zone:  I need urgent medical care Actions     Severe shortness of breath even at rest   Call 911 or seek medical care immediately     Not able to do any activity because of breathing      Fever or shaking chills      Feeling confused or very drowsy       Chest pains      Coughing up blood                  "

## 2024-03-07 NOTE — CARE PLAN
The patient is Stable - Low risk of patient condition declining or worsening    Shift Goals  Clinical Goals: discharge  Patient Goals: pain control and rest  Family Goals: not present    Progress made toward(s) clinical / shift goals:    Problem: Pain - Standard  Goal: Alleviation of pain or a reduction in pain to the patient’s comfort goal  Outcome: Progressing     Problem: Knowledge Deficit - Standard  Goal: Patient and family/care givers will demonstrate understanding of plan of care, disease process/condition, diagnostic tests and medications  Outcome: Progressing     Problem: Urinary Elimination  Goal: Establish and maintain regular urinary output  Outcome: Progressing       Patient is not progressing towards the following goals:

## 2024-03-07 NOTE — DISCHARGE PLANNING
Case Management Discharge Planning    Admission Date: 3/4/2024  GMLOS: 3.7  ALOS: 2    6-Clicks ADL Score: 24  6-Clicks Mobility Score: 22      Anticipated Discharge Dispo: Discharge Disposition: Discharged to home/self care (01)  Discharge Contact Phone Number: [phone]    DME Needed: FWW    Action(s) Taken:   RN CM met with patient's spouse, Flory at bedside.  They live in a 2 story house in [place] with 13 steps up to their bedroom.  They have a half bath downstairs.  Flory concerned patient won't be able to get up stairs to bedroom.  She is looking to purchase a recliner between now and dc as that's what patient has used to sleep and rest in after back surgeries.  She anticipates he will need a walker.  Pt works at [employer].  He does not need FMLA.    Medically Clear: No    Next Steps:   Obtain FWW order.    Barriers to Discharge: Medical clearance and DME    Care Transition Team Assessment    Information Source  Orientation Level: Oriented X4  Information Given By: Spouse  Informant's Name: Flory Hu  Who is responsible for making decisions for patient? : Patient    Readmission Evaluation  Is this a readmission?: No    Elopement Risk  Legal Hold: No  Ambulatory or Self Mobile in Wheelchair: Yes  Disoriented: No  Psychiatric Symptoms: None  History of Wandering: No  Elopement this Admit: No  Vocalizing Wanting to Leave: No  Displays Behaviors, Body Language Wanting to Leave: No-Not at Risk for Elopement  Elopement Risk: Not at Risk for Elopement    Interdisciplinary Discharge Planning  Lives with - Patient's Self Care Capacity: Spouse  Support Systems: (Pended) Family Member(s), Spouse / Significant Other  Housing / Facility: [address] [facility]  Prior Services: Home-Independent  Patient Prefers to be Discharged to:: (Pended) Home  Durable Medical Equipment: (Pended) Not Applicable    Discharge Preparedness  What is your plan after discharge?: Home with help  What are your discharge supports?: Spouse,  Child  Prior Functional Level: Ambulatory, Drives Self, Independent with Activities of Daily Living, Independent with Medication Management  Difficulity with ADLs: Walking    Functional Assesment  Prior Functional Level: Ambulatory, Drives Self, Independent with Activities of Daily Living, Independent with Medication Management    Finances  Financial Barriers to Discharge: No  Prescription Coverage: Yes              Advance Directive  Advance Directive?: None    Domestic Abuse  Have you ever been the victim of abuse or violence?: No    Psychological Assessment  History of Substance Abuse: None  History of Psychiatric Problems: No    Discharge Risks or Barriers  Discharge risks or barriers?: No  Patient risk factors: Bariatric, Cognitive / sensory / physical deficit    Anticipated Discharge Information  Discharge Disposition: Discharged to home/self care (01)  Discharge Contact Phone Number: [phone]

## 2024-03-07 NOTE — CARE PLAN
The patient is Stable - Low risk of patient condition declining or worsening    Shift Goals  Clinical Goals: pain control, mobility  Patient Goals: pain control, rest  Family Goals: comfort    Progress made toward(s) clinical / shift goals:    Problem: Pain - Standard  Goal: Alleviation of pain or a reduction in pain to the patient’s comfort goal  Outcome: Progressing     Problem: Knowledge Deficit - Standard  Goal: Patient and family/care givers will demonstrate understanding of plan of care, disease process/condition, diagnostic tests and medications  Outcome: Progressing       Patient is not progressing towards the following goals:

## 2024-03-08 ENCOUNTER — PHARMACY VISIT (OUTPATIENT)
Dept: PHARMACY | Facility: MEDICAL CENTER | Age: 49
End: 2024-03-08
Payer: COMMERCIAL

## 2024-03-08 VITALS
BODY MASS INDEX: 38.65 KG/M2 | DIASTOLIC BLOOD PRESSURE: 88 MMHG | RESPIRATION RATE: 16 BRPM | OXYGEN SATURATION: 96 % | TEMPERATURE: 97.9 F | SYSTOLIC BLOOD PRESSURE: 141 MMHG | HEART RATE: 85 BPM | HEIGHT: 70 IN | WEIGHT: 270 LBS

## 2024-03-08 LAB
ANION GAP SERPL CALC-SCNC: 10 MMOL/L (ref 7–16)
BUN SERPL-MCNC: 12 MG/DL (ref 8–22)
CALCIUM SERPL-MCNC: 8.9 MG/DL (ref 8.5–10.5)
CHLORIDE SERPL-SCNC: 104 MMOL/L (ref 96–112)
CO2 SERPL-SCNC: 28 MMOL/L (ref 20–33)
CREAT SERPL-MCNC: 0.85 MG/DL (ref 0.5–1.4)
ERYTHROCYTE [DISTWIDTH] IN BLOOD BY AUTOMATED COUNT: 39.8 FL (ref 35.9–50)
GFR SERPLBLD CREATININE-BSD FMLA CKD-EPI: 105 ML/MIN/1.73 M 2
GLUCOSE SERPL-MCNC: 108 MG/DL (ref 65–99)
HCT VFR BLD AUTO: 44.8 % (ref 42–52)
HGB BLD-MCNC: 14.7 G/DL (ref 14–18)
MCH RBC QN AUTO: 28.8 PG (ref 27–33)
MCHC RBC AUTO-ENTMCNC: 32.8 G/DL (ref 32.3–36.5)
MCV RBC AUTO: 87.7 FL (ref 81.4–97.8)
PLATELET # BLD AUTO: 212 K/UL (ref 164–446)
PMV BLD AUTO: 10.6 FL (ref 9–12.9)
POTASSIUM SERPL-SCNC: 4.2 MMOL/L (ref 3.6–5.5)
RBC # BLD AUTO: 5.11 M/UL (ref 4.7–6.1)
SODIUM SERPL-SCNC: 142 MMOL/L (ref 135–145)
WBC # BLD AUTO: 8 K/UL (ref 4.8–10.8)

## 2024-03-08 PROCEDURE — 700102 HCHG RX REV CODE 250 W/ 637 OVERRIDE(OP): Performed by: PHYSICIAN ASSISTANT

## 2024-03-08 PROCEDURE — 85027 COMPLETE CBC AUTOMATED: CPT

## 2024-03-08 PROCEDURE — 80048 BASIC METABOLIC PNL TOTAL CA: CPT

## 2024-03-08 PROCEDURE — 700102 HCHG RX REV CODE 250 W/ 637 OVERRIDE(OP): Performed by: INTERNAL MEDICINE

## 2024-03-08 PROCEDURE — A9270 NON-COVERED ITEM OR SERVICE: HCPCS | Performed by: STUDENT IN AN ORGANIZED HEALTH CARE EDUCATION/TRAINING PROGRAM

## 2024-03-08 PROCEDURE — RXMED WILLOW AMBULATORY MEDICATION CHARGE: Performed by: PHYSICIAN ASSISTANT

## 2024-03-08 PROCEDURE — 99232 SBSQ HOSP IP/OBS MODERATE 35: CPT | Performed by: INTERNAL MEDICINE

## 2024-03-08 PROCEDURE — 700102 HCHG RX REV CODE 250 W/ 637 OVERRIDE(OP): Performed by: STUDENT IN AN ORGANIZED HEALTH CARE EDUCATION/TRAINING PROGRAM

## 2024-03-08 PROCEDURE — A9270 NON-COVERED ITEM OR SERVICE: HCPCS | Performed by: INTERNAL MEDICINE

## 2024-03-08 PROCEDURE — A9270 NON-COVERED ITEM OR SERVICE: HCPCS | Performed by: PHYSICIAN ASSISTANT

## 2024-03-08 RX ORDER — METHOCARBAMOL 750 MG/1
750 TABLET, FILM COATED ORAL 4 TIMES DAILY PRN
Qty: 56 TABLET | Refills: 0 | Status: SHIPPED | OUTPATIENT
Start: 2024-03-08 | End: 2024-03-22

## 2024-03-08 RX ORDER — CYCLOBENZAPRINE HCL 10 MG
10 TABLET ORAL 3 TIMES DAILY
Status: DISCONTINUED | OUTPATIENT
Start: 2024-03-08 | End: 2024-03-08 | Stop reason: HOSPADM

## 2024-03-08 RX ORDER — DOXYCYCLINE 100 MG/1
100 CAPSULE ORAL 2 TIMES DAILY
Qty: 10 CAPSULE | Refills: 0 | Status: ACTIVE | OUTPATIENT
Start: 2024-03-08 | End: 2024-03-13

## 2024-03-08 RX ORDER — HYDROCODONE BITARTRATE AND ACETAMINOPHEN 10; 325 MG/1; MG/1
1 TABLET ORAL EVERY 4 HOURS PRN
Qty: 42 TABLET | Refills: 0 | Status: SHIPPED | OUTPATIENT
Start: 2024-03-08 | End: 2024-03-08

## 2024-03-08 RX ORDER — HYDROCODONE BITARTRATE AND ACETAMINOPHEN 5; 325 MG/1; MG/1
2 TABLET ORAL EVERY 4 HOURS PRN
Qty: 70 TABLET | Refills: 0 | Status: SHIPPED | OUTPATIENT
Start: 2024-03-08 | End: 2024-03-15

## 2024-03-08 RX ADMIN — ANTACID TABLETS 500 MG: 500 TABLET, CHEWABLE ORAL at 05:42

## 2024-03-08 RX ADMIN — Medication 1 APPLICATOR: at 05:43

## 2024-03-08 RX ADMIN — GABAPENTIN 400 MG: 400 CAPSULE ORAL at 05:42

## 2024-03-08 RX ADMIN — GABAPENTIN 400 MG: 400 CAPSULE ORAL at 12:14

## 2024-03-08 RX ADMIN — ACETAMINOPHEN 650 MG: 325 TABLET, FILM COATED ORAL at 12:13

## 2024-03-08 RX ADMIN — HYDROCODONE BITARTRATE AND ACETAMINOPHEN 1 TABLET: 5; 325 TABLET ORAL at 05:42

## 2024-03-08 RX ADMIN — CYCLOBENZAPRINE 10 MG: 10 TABLET, FILM COATED ORAL at 08:46

## 2024-03-08 RX ADMIN — DOCUSATE SODIUM 100 MG: 100 CAPSULE, LIQUID FILLED ORAL at 05:42

## 2024-03-08 RX ADMIN — CHOLECALCIFEROL TAB 125 MCG (5000 UNIT) 5000 UNITS: 125 TAB at 05:42

## 2024-03-08 RX ADMIN — HYDROCODONE BITARTRATE AND ACETAMINOPHEN 1 TABLET: 10; 325 TABLET ORAL at 10:25

## 2024-03-08 RX ADMIN — HYDROCODONE BITARTRATE AND ACETAMINOPHEN 1 TABLET: 5; 325 TABLET ORAL at 02:21

## 2024-03-08 ASSESSMENT — PAIN DESCRIPTION - PAIN TYPE
TYPE: ACUTE PAIN;SURGICAL PAIN
TYPE: ACUTE PAIN;SURGICAL PAIN

## 2024-03-08 NOTE — DISCHARGE PLANNING
Received choice form @: 2638  Agency/Facility name: [facility] referral per choice form @: 1242

## 2024-03-08 NOTE — PROGRESS NOTES
Neurosurgery Progress Note    Subjective:  48 year old male presenting with symptoms of cauda equina syndrome.  He was previously seen in clinic and tentatively scheduled for L2/3 XLIF with laminectomy and posterior fusion. He presents with worsening right lower extremity pain and weakness as well as groin numbness and urinary retention. He has hx of solid bony fusion from L3-S1.     POD#3 left L2/3 XLIF with laminectomy and L2-4 fusion  Patient doing great  Tolerable back pain  Right leg pain resolved  Left leg pain much improved  Ambualting, voiding, had BM  Drain with 75cc this morning even on 1/2 compression. Fluid is serosanguinous.   No saddle anesthesia     Exam:  Pleasant and cooperative   Drain in  Dressings clean  Motor 5/5  Sensory intact       BP  Min: 132/84  Max: 146/83  Pulse  Av  Min: 79  Max: 97  Resp  Avg: 15.8  Min: 15  Max: 16  Temp  Av.7 °C (98.1 °F)  Min: 36.6 °C (97.9 °F)  Max: 36.8 °C (98.2 °F)  SpO2  Av.8 %  Min: 94 %  Max: 96 %    No data recorded    Recent Labs     24  0104   WBC 8.0   RBC 5.11   HEMOGLOBIN 14.7   HEMATOCRIT 44.8   MCV 87.7   MCH 28.8   MCHC 32.8   RDW 39.8   PLATELETCT 212   MPV 10.6     Recent Labs     24  0659 24  0104   SODIUM 140 142   POTASSIUM 4.3 4.2   CHLORIDE 104 104   CO2 27 28   GLUCOSE 124* 108*   BUN 10 12   CREATININE 0.91 0.85   CALCIUM 8.6 8.9                 Intake/Output                         24 - 24 0659 24 - 24 0659      Total  Total                 Intake    Total Intake -- -- -- -- -- --       Output    Drains  120  115 235  --  -- --    Output (mL) (Closed/Suction Drain 1 Posterior Back Chadd Bhakta) 120 115 235 -- -- --    Stool  --  -- --  --  -- --    Number of Times Stooled 1 x -- 1 x -- -- --    Total Output 120 115 235 -- -- --       Net I/O     -120 -115 -235 -- -- --              Intake/Output Summary (Last 24 hours) at 3/8/2024  1156  Last data filed at 3/8/2024 0411  Gross per 24 hour   Intake --   Output 165 ml   Net -165 ml             cyclobenzaprine  10 mg TID    Nozin nasal  swab  1 Applicator BID    Pharmacy Consult Request  1 Each PHARMACY TO DOSE    MD ALERT...DO NOT ADMINISTER NSAIDS or ASPIRIN unless ORDERED By Neurosurgery  1 Each PRN    docusate sodium  100 mg BID    senna-docusate  1 Tablet Nightly    senna-docusate  1 Tablet Q24HRS PRN    polyethylene glycol/lytes  1 Packet BID PRN    magnesium hydroxide  30 mL QDAY PRN    bisacodyl  10 mg Q24HRS PRN    sodium phosphate  1 Each Once PRN    NS   Continuous    HYDROcodone-acetaminophen  1 Tablet Q4HRS PRN    HYDROcodone/acetaminophen  1 Tablet Q4HRS PRN    oxyCODONE immediate-release  5 mg Q4HRS PRN    Or    oxyCODONE immediate-release  10 mg Q4HRS PRN    HYDROmorphone  0.5 mg Q3HRS PRN    diphenhydrAMINE  25 mg Q6HRS PRN    Or    diphenhydrAMINE  25 mg Q6HRS PRN    ondansetron  4 mg Q4HRS PRN    ondansetron  4 mg Q4HRS PRN    promethazine  12.5-25 mg Q4HRS PRN    promethazine  12.5-25 mg Q4HRS PRN    prochlorperazine  5-10 mg Q4HRS PRN    benzocaine-menthol  1 Lozenge Q2HRS PRN    calcium carbonate  500 mg BID    vitamin D3  5,000 Units DAILY    methocarbamol  750 mg Q8HRS PRN    albuterol  2 Puff Q4HRS PRN    gabapentin  400 mg TID    acetaminophen  650 mg Q6HRS    Followed by    [START ON 3/9/2024] acetaminophen  650 mg Q6HRS PRN    lidocaine  1 Patch Q24HR    labetalol  10 mg Q4HRS PRN    ipratropium-albuterol  3 mL Q4H PRN (RT)       Assessment and Plan:  Hospital day #5  POD#3 L2/3 XLIF with L2 laminectomy and extension of fusion to L2  D/c home with drain in  Will teach to manage drain  He will f/u in office monday for drain removal. Will send home with keflex.   Answered all questions  Rx to bedside       Chemical prophylactic DVT therapy: No  Start date/time: tomorrow 3/6

## 2024-03-08 NOTE — FACE TO FACE
Face to Face Note  -  Durable Medical Equipment    Buddy Isbell P.A.-C. - NPI: [id number]  I certify that this patient is under my care and that they had a durable medical equipment(DME)face to face encounter by myself that meets the physician DME face-to-face encounter requirements with this patient on:    Date of encounter:   Patient:                    MRN:                       YOB: 2026  Luis Carlos Singh  [id number]  1975     The encounter with the patient was in whole, or in part, for the following medical condition, which is the primary reason for durable medical equipment:  Post-Op Surgery    I certify that, based on my findings, the following durable medical equipment is medically necessary:    Walkers.    My Clinical findings support the need for the above equipment due to:  Abnormal Gait

## 2024-03-08 NOTE — CARE PLAN
The patient is Stable - Low risk of patient condition declining or worsening    Shift Goals  Clinical Goals: pain control, YAZ drain  Patient Goals: pain control, rest  Family Goals: n/a    Progress made toward(s) clinical / shift goals:    Problem: Pain - Standard  Goal: Alleviation of pain or a reduction in pain to the patient’s comfort goal  Outcome: Progressing     Problem: Knowledge Deficit - Standard  Goal: Patient and family/care givers will demonstrate understanding of plan of care, disease process/condition, diagnostic tests and medications  Outcome: Progressing       Patient is not progressing towards the following goals:

## 2024-03-08 NOTE — PROGRESS NOTES
FWW fit to pt and left at bedside. Pt instructed on use and adjustments if necessary after dc. All relevant questions answered at this time.    Contact traction for any questions or concerns regarding this DME.   PROVIDER:[TOKEN:[[id number]]]

## 2024-03-08 NOTE — DISCHARGE SUMMARY
Discharge Summary    CHIEF COMPLAINT ON ADMISSION  Chief Complaint   Patient presents with    Low Back Pain     Chronic 5/10 lower back pain. Degenerative disc problem with multiple surgeries to the lumbar and sacral area.  Last night, severe pain started while sleeping with right leg weakness and pain. Urine incontinence started this morning.        Reason for Admission  Back Pain     Admission Date  3/4/2024    CODE STATUS  Full Code    HPI & HOSPITAL COURSE  Luis Carlos Singh is a 48 y.o. male with history of asthma, chronic back pain for which he follows Dr. Lauren, who presented 3/4/2024 to ER at the referral neurosurgery PA for evaluation of lumbar radiculopathy with worsening right lower extremity pain, weakness and groin numbness, urinary retention with mild episode of urinary incontinence without bowel incontinence, with suspicion for cauda equina syndrome.  On evaluation, WBC was 11,300.  Electrolytes and renal function were normal.  LFTs were normal.  MRI showed severe canal stenosis at L2-3 with cauda equina impingement, with bilateral moderate to severe foraminal narrowing with impingement upon the exiting L2 nerves.  Neurosurgery was called, and recommended surgery for lateral interbody fusion with sagittal balance correction and lumbar laminectomy and fusion.  Patient was started on Decadron, along with analgesics. Patient underwent L2-L3 lateral discectomy and titanium interbody cage placement, lateral interbody/allograft arthrodesis and fusion, and lateral plating fixation on 3/5/2024.  He did well postoperatively.  Presenting symptoms have improved.  He remained hemodynamically stable and afebrile.  He has been weaned off supplemental oxygen.  Electrolytes and renal function remained normal.  He did continue to have not insignificant output in YAZ drain, and neurosurgery recommended keeping it in place until seen in the office.  PT/OT recommended outpatient therapy, and referral was placed.      I have personally seen and examined the patient on the day of discharge. With his clinical improvement, he was deemed ready to discharge from the hospital as he did not have any further hospitalization needs. Patient felt comfortable going home. The discharge plan was discussed with the patient, with which he was agreeable to.     Therefore, he is discharged in good and stable condition to home with close outpatient follow-up.    The patient met 2-midnight criteria for an inpatient stay at the time of discharge.    Discharge Date  3/8/2024      FOLLOW UP ITEMS POST DISCHARGE  -Continue course of prophylactic antibiotics.  He is prescribed as needed opioid analgesics, along with Robaxin.  -Outpatient follow-up with neurosurgery for drain removal in the office.  - counseled to seek immediate medical attention, or return to the ED for recurrent or worsening symptoms.      DISCHARGE DIAGNOSES  Principal Problem:    Cauda equina syndrome (HCC) (POA: Yes)  Active Problems:    Lumbar radiculopathy, acute (POA: Yes)    Hyperkalemia (POA: No)    Asthma-chronic obstructive pulmonary disease overlap syndrome (POA: Yes)    Hypogonadotropic hypogonadism (HCC) (POA: Yes)    Neuropathy (POA: Yes)    Class 2 obesity in adult (POA: Yes)  Resolved Problems:    * No resolved hospital problems. *      FOLLOW UP  No future appointments.  No follow-up provider specified.    MEDICATIONS ON DISCHARGE     Medication List        START taking these medications        Instructions   doxycycline 100 MG capsule  Commonly known as: Monodox   Take 1 Capsule by mouth 2 times a day for 5 days.  Dose: 100 mg     HYDROcodone-acetaminophen 5-325 MG Tabs per tablet  Commonly known as: Norco   Take 1-2 Tablets by mouth every four hours as needed (pain) for up to 7 days. max 10 tablets per day  (Take 2 Tablets by mouth every four hours as needed (pain) for up to 7 days. 1-2 by mouth every 4 hrs for severe pain, 10/day max)  Dose: 2 Tablet    "  methocarbamol 750 MG Tabs  Commonly known as: Robaxin   Take 1 Tablet by mouth 4 times a day as needed (pain/spasm) for up to 14 days.  Dose: 750 mg            CONTINUE taking these medications        Instructions   albuterol 108 (90 Base) MCG/ACT Aers inhalation aerosol   Inhale 2 Puffs every four hours as needed for Shortness of Breath.  Dose: 2 Puff     gabapentin 400 MG Caps  Commonly known as: Neurontin   Take 1 Capsule by mouth 3 times a day.  Dose: 400 mg     NEEDLE (DISP) 18 G 18G X 1-1/2\" Misc   Use one needle weekly for testosterone     testosterone cypionate 200 MG/ML injection  Commonly known as: Depo-Testosterone   140 mg every Wednesday. 0.7 ml = 140 mg  Dose: 140 mg              Allergies  Allergies   Allergen Reactions    Penicillins Rash     > 40 years ago       DIET  Orders Placed This Encounter   Procedures    Diet Order Diet: Regular     Standing Status:   Standing     Number of Occurrences:   1     Order Specific Question:   Diet:     Answer:   Regular [1]       ACTIVITY  As tolerated.  Weight bearing as tolerated    CONSULTATIONS  Neurosurgery    PROCEDURES  As above    LABORATORY  Lab Results   Component Value Date    SODIUM 142 03/08/2024    POTASSIUM 4.2 03/08/2024    CHLORIDE 104 03/08/2024    CO2 28 03/08/2024    GLUCOSE 108 (H) 03/08/2024    BUN 12 03/08/2024    CREATININE 0.85 03/08/2024        Lab Results   Component Value Date    WBC 8.0 03/08/2024    HEMOGLOBIN 14.7 03/08/2024    HEMATOCRIT 44.8 03/08/2024    PLATELETCT 212 03/08/2024        Total time of the discharge process = 52 minutes.  "

## 2024-03-08 NOTE — DISCHARGE PLANNING
Case Management Discharge Planning    Met with pt at bedside to obtain choice for [facility]. Choice form completed and faxed to [facility]

## 2024-03-08 NOTE — PROGRESS NOTES
Hospital Medicine Daily Progress Note    Date of Service  3/8/2024    Chief Complaint  Low back pain    Hospital Course  Luis Carlos Singh is a 48 y.o. male with history of asthma, chronic back pain for which he follows Dr. Lauren, who presented 3/4/2024 to ER at the referral neurosurgery [facility] for evaluation of lumbar radiculopathy with worsening right lower extremity pain, weakness and groin numbness, urinary retention with mild episode of urinary incontinence without bowel incontinence, with suspicion for cauda equina syndrome.  On evaluation, WBC was 11,300.  Electrolytes and renal function were normal.  LFTs were normal.  MRI showed severe canal stenosis at L2-3 with cauda equina impingement, with bilateral moderate to severe foraminal narrowing with impingement upon the exiting L2 nerves.  Neurosurgery was called, and recommended surgery for lateral interbody fusion with sagittal balance correction and lumbar laminectomy and fusion.  Patient was started on Decadron, along with analgesics. Patient underwent L2-L3 lateral discectomy and titanium interbody cage placement, lateral interbody/allograft arthrodesis and fusion, and lateral plating fixation yesterday 3/5/2024.  Presenting symptoms have improved postoperatively.  He remained hemodynamically stable and afebrile.  He has been weaned off supplemental oxygen.  Electrolytes and renal function remained normal.   PT/OT recommended outpatient therapy.      Interval Problem Update  3/8/2024 - I reviewed the patient's chart today. Uneventful night. VSS. Afebrile. Saturating well on RA.  No leukocytosis.  Hemoglobin is stable.  Electrolytes and renal function remain normal.  Had 235 cc of output from the YAZ drain in the last 24 hours, 65 cc of which was from midnight.    > I have personally seen and examined the patient today.  Pain is adequately controlled.  He is able to ambulate without problems.  No nausea or vomiting.  Passing a lot of gas, although no  bowel movements yet.  Has muscle spasms on the back.    I personally reviewed all lab results mentioned above. Prior medical records from this institution and outside facilities were independently reviewed as noted. I also personally reviewed all ER physician and consultant recommendations and plans as documented above. History was independently obtained by myself. I have discussed this patient's plan of care and discharge plan at IDT rounds today with Case Management, Nursing, Nursing leadership, and other members of the IDT team.    Consultants/Specialty  neurosurgery    Code Status  Full Code    Disposition  The patient is not medically cleared for discharge to home or a post-acute facility.      PT/OT recommended outpatient therapy.  I have placed the appropriate orders for post-discharge needs.    Review of Systems  ROS     Pertinent positives/negatives as mentioned above.     A complete review of systems was personally done by me. All other systems were negative.       Physical Exam  Temp:  [36.6 °C (97.9 °F)-36.8 °C (98.2 °F)] 36.6 °C (97.9 °F)  Pulse:  [79-97] 85  Resp:  [15-16] 16  BP: (132-146)/(59-88) 141/88  SpO2:  [94 %-96 %] 96 %    Physical Exam  Vitals reviewed.   Constitutional:       General: He is not in acute distress.     Appearance: Normal appearance. He is obese. He is not toxic-appearing or diaphoretic.      Comments: Body mass index is 38.74 kg/m².   HENT:      Head: Normocephalic and atraumatic.      Right Ear: External ear normal.      Left Ear: External ear normal.      Mouth/Throat:      Mouth: Mucous membranes are moist.      Pharynx: No oropharyngeal exudate.   Eyes:      General: No scleral icterus.     Extraocular Movements: Extraocular movements intact.      Conjunctiva/sclera: Conjunctivae normal.      Pupils: Pupils are equal, round, and reactive to light.   Cardiovascular:      Rate and Rhythm: Normal rate and regular rhythm.      Heart sounds: Normal heart sounds. No murmur  heard.     No gallop.   Pulmonary:      Effort: Pulmonary effort is normal. No respiratory distress.      Breath sounds: Normal breath sounds. No stridor. No wheezing, rhonchi or rales.   Chest:      Chest wall: No tenderness.   Abdominal:      General: Bowel sounds are normal. There is no distension.      Palpations: Abdomen is soft. There is no mass.      Tenderness: There is no abdominal tenderness. There is no guarding or rebound.   Musculoskeletal:         General: No swelling. Normal range of motion.      Cervical back: Normal range of motion and neck supple.      Right lower leg: No edema.      Left lower leg: No edema.      Comments: (+) YAZ drain in place with serosanguineous drainage   Lymphadenopathy:      Cervical: No cervical adenopathy.   Skin:     General: Skin is warm and dry.      Coloration: Skin is not jaundiced.      Findings: No rash.   Neurological:      General: No focal deficit present.      Mental Status: He is alert and oriented to person, place, and time.      Cranial Nerves: No cranial nerve deficit.   Psychiatric:         Mood and Affect: Mood normal.         Behavior: Behavior normal.         Thought Content: Thought content normal.         Judgment: Judgment normal.       I have performed the physical examination today 3/8/2024.  In review of yesterday's note, there are no new changes except as documented above.      Fluids    Intake/Output Summary (Last 24 hours) at 3/8/2024 1056  Last data filed at 3/8/2024 0411  Gross per 24 hour   Intake --   Output 165 ml   Net -165 ml       Laboratory  Recent Labs     03/08/24  0104   WBC 8.0   RBC 5.11   HEMOGLOBIN 14.7   HEMATOCRIT 44.8   MCV 87.7   MCH 28.8   MCHC 32.8   RDW 39.8   PLATELETCT 212   MPV 10.6     Recent Labs     03/06/24  0659 03/08/24  0104   SODIUM 140 142   POTASSIUM 4.3 4.2   CHLORIDE 104 104   CO2 27 28   GLUCOSE 124* 108*   BUN 10 12   CREATININE 0.91 0.85   CALCIUM 8.6 8.9                     Imaging  DX-O-ARM   Final  Result      Portable O-arm utilized for 4 seconds.         INTERPRETING LOCATION: [address], [place], [address]      DX-LUMBAR SPINE-2 OR 3 VIEWS   Final Result      Portable fluoroscopy as described.      DX-PORTABLE FLUORO > 1 HOUR   Final Result      Portable fluoroscopy utilized for 1 minute 40 seconds.         INTERPRETING LOCATION: [address], [place], [address]      MR-LUMBAR SPINE-W/O   Final Result         Severe canal stenosis at L2-3 with cauda equina impingement, unchanged from the prior examination. There is bilateral moderate-to-severe foraminal narrowing with impingement upon the exiting L2 nerves.      Postoperative changes with well decompressed spinal canal at the L4-5, L5-S1 level.      Bilateral moderate foraminal narrowing at L5-S1, stable from previous examination.              Assessment/Plan  * Cauda equina syndrome (HCC)- (present on admission)  Assessment & Plan  - MRI showed severe canal stenosis at L2-3 with cauda equina impingement, with bilateral moderate to severe foraminal narrowing with impingement upon the exiting L2 nerves.    - now s/p L2-L3 lateral discectomy and titanium interbody cage placement, lateral interbody/allograft arthrodesis and fusion, and lateral plating fixation 3/5/2024.  Continues to have significant output from YAZ drain.  Continue to hold prophylactic Lovenox for now.  -Off IV Decadron.  Has muscle spasms, start scheduled Flexeril, along with as needed Robaxin.   Continue as needed oral Norco and IV Dilaudid.  -Continue LSO brace.  -Outpatient skilled therapies per PT/OT recommendations.  Referrals placed.    Hyperkalemia  Assessment & Plan  - Resolved.  Continue to monitor.  BMP in the morning    Lumbar radiculopathy, acute- (present on admission)  Assessment & Plan  - Management as above.    Class 2 obesity in adult- (present on admission)  Assessment & Plan  - Body mass index is 38.74 kg/m²..  -  on weight loss.  - outpatient referral for outpatient  weight management.      Neuropathy- (present on admission)  Assessment & Plan  -Continue gabapentin    Hypogonadotropic hypogonadism (HCC)- (present on admission)  Assessment & Plan  - Per history  -Was on testosterone  -Continue outpatient follow-up    Asthma-chronic obstructive pulmonary disease overlap syndrome- (present on admission)  Assessment & Plan  - Not in acute exacerbation.  Not hypoxic.  No wheezing on exam.  -Continue respiratory support, as needed bronchodilators.  Keep saturations above 88%.         VTE prophylaxis: SCD    My total time spent caring for the patient on the day of the encounter was 45 minutes. This does not include time spent on separately billable procedures/tests.

## 2024-03-08 NOTE — DISCHARGE SUMMARY
DATE OF ADMISSION:  03/04/2024   DATE OF DISCHARGE:  03/08/2024     ADMITTING DOCTOR:  [facility], currently Nikolay Rebollar MD     CONSULTING:  Sandro Lauren III, MD, neurosurgery     PRESENTING DIAGNOSES:  1.  Cauda equina syndrome.  2.  Hyperkalemia.  3.  Lumbar radiculopathy.  4.  Class II obesity in adult.  5.  Neuropathy.  6.  Hypogonadotropic hypogonadism.  7.  Chronic asthma.  8.  Lumbar spondylosis.  9.  Lumbar stenosis.     SURGERY PERFORMED:  The patient underwent left L2-3 XLIF with laminectomy with   stealth L2 through L4 fusion performed on 03/05/2024.     HOSPITAL COURSE:  This is a very pleasant 48-year-old gentleman who has a   history of lumbar spine symptoms.  He was being worked up for a lumbar surgery   though in the meantime, he notes his symptoms got dramatically worse.  He   also developed saddle anesthesia with urinary incontinence.  While in the   hospital he did have a catheter placed with a postvoid residual of 550 mL.  It   was deemed that the patient did need to undergo urgent lumbar surgery to   prevent any progression of symptoms and certainly any nerve damage.     The patient did undergo the above surgery without any complication.  He did   immediately have improvement of his symptoms.  At this point, he is doing   extremely well with minimal back pain.  His previous radicular leg pain is   resolved.  His urinary incontinence/retention is resolved.  At this time, he   is cleared for discharge though he has been waiting as his drain has been   putting out more than comfortable for us to pull.  At this time, it has been   deemed that we will send him home with his drain in place.  He will be taught   how to empty and manage the drain.  We will see him back on Monday for drain   removal.  At this time, he is cleared for discharge home after an uneventful   hospital stay.     DISCHARGE INSTRUCTIONS:  The patient is to not lift, push, pull greater than   10 pounds for  the next 6 weeks.  He is to avoid excessive bending, flexing,   twisting.  He is to walk often.  He will empty and record his drain every 8-12   hours.  He is encouraged to ice the back often.  He can eat a normal diet.    He is to use stool softeners.  He is to not drive for next 2 weeks or anytime   while taking narcotic pain medication.     He does have followup on Monday for drain removal as well as we will make a 2   week visit.  Should he have any questions, to call our office.  Should he have   any emergent changes, he will go to nearest ER.     DISCHARGE MEDICATIONS:  Along with his usual home medications, we will send   him home with the following medicines:  1.  Keflex 500 mg 1 p.o. q.i.d., #20.  2.  Norco 10/325 mg 1 p.o. q. 4 hours p.r.n. severe pain, #42.  3.  Robaxin 750 mg 1 p.o. q.i.d. p.r.n. spasm, #56.     All questions were answered.  At this time, he is cleared for discharge home   after an uneventful hospital stay.        ______________________________  MILLER OLIVA PA-C        ______________________________  MD KIKI Malloy III/SANKET    DD:  03/08/2024 12:07  DT:  03/08/2024 14:20    Job#:  [id number]

## 2024-03-08 NOTE — CARE PLAN
The patient is Stable - Low risk of patient condition declining or worsening    Shift Goals  Clinical Goals: pain control, YAZ drain  Patient Goals: pain control, rest  Family Goals: n/a    Progress made toward(s) clinical / shift goals:    Problem: Pain - Standard  Goal: Alleviation of pain or a reduction in pain to the patient’s comfort goal  Outcome: Met     Problem: Knowledge Deficit - Standard  Goal: Patient and family/care givers will demonstrate understanding of plan of care, disease process/condition, diagnostic tests and medications  Outcome: Met     Problem: Urinary Elimination  Goal: Establish and maintain regular urinary output  Outcome: Met       Patient is not progressing towards the following goals:

## 2024-04-25 ENCOUNTER — APPOINTMENT (OUTPATIENT)
Dept: OCCUPATIONAL THERAPY | Facility: REHABILITATION | Age: 49
End: 2024-04-25
Attending: INTERNAL MEDICINE
Payer: COMMERCIAL

## 2024-04-28 ENCOUNTER — APPOINTMENT (OUTPATIENT)
Dept: RADIOLOGY | Facility: MEDICAL CENTER | Age: 49
End: 2024-04-28
Attending: EMERGENCY MEDICINE
Payer: COMMERCIAL

## 2024-04-28 ENCOUNTER — HOSPITAL ENCOUNTER (EMERGENCY)
Facility: MEDICAL CENTER | Age: 49
End: 2024-04-28
Attending: EMERGENCY MEDICINE
Payer: COMMERCIAL

## 2024-04-28 VITALS
RESPIRATION RATE: 20 BRPM | HEIGHT: 70 IN | WEIGHT: 266.1 LBS | BODY MASS INDEX: 38.09 KG/M2 | HEART RATE: 105 BPM | OXYGEN SATURATION: 93 % | TEMPERATURE: 100 F | SYSTOLIC BLOOD PRESSURE: 111 MMHG | DIASTOLIC BLOOD PRESSURE: 57 MMHG

## 2024-04-28 DIAGNOSIS — N39.0 ACUTE UTI: ICD-10-CM

## 2024-04-28 LAB
ALBUMIN SERPL BCP-MCNC: 4.3 G/DL (ref 3.2–4.9)
ALBUMIN/GLOB SERPL: 1.3 G/DL
ALP SERPL-CCNC: 57 U/L (ref 30–99)
ALT SERPL-CCNC: 13 U/L (ref 2–50)
ANION GAP SERPL CALC-SCNC: 12 MMOL/L (ref 7–16)
APPEARANCE UR: ABNORMAL
AST SERPL-CCNC: 8 U/L (ref 12–45)
BACTERIA #/AREA URNS HPF: ABNORMAL /HPF
BASOPHILS # BLD AUTO: 0.3 % (ref 0–1.8)
BASOPHILS # BLD: 0.05 K/UL (ref 0–0.12)
BILIRUB SERPL-MCNC: 0.8 MG/DL (ref 0.1–1.5)
BILIRUB UR QL STRIP.AUTO: NEGATIVE
BUN SERPL-MCNC: 10 MG/DL (ref 8–22)
CALCIUM ALBUM COR SERPL-MCNC: 9.2 MG/DL (ref 8.5–10.5)
CALCIUM SERPL-MCNC: 9.4 MG/DL (ref 8.5–10.5)
CHLORIDE SERPL-SCNC: 101 MMOL/L (ref 96–112)
CO2 SERPL-SCNC: 25 MMOL/L (ref 20–33)
COLOR UR: YELLOW
CREAT SERPL-MCNC: 0.95 MG/DL (ref 0.5–1.4)
EOSINOPHIL # BLD AUTO: 0.02 K/UL (ref 0–0.51)
EOSINOPHIL NFR BLD: 0.1 % (ref 0–6.9)
EPI CELLS #/AREA URNS HPF: NEGATIVE /HPF
ERYTHROCYTE [DISTWIDTH] IN BLOOD BY AUTOMATED COUNT: 40.3 FL (ref 35.9–50)
GFR SERPLBLD CREATININE-BSD FMLA CKD-EPI: 97 ML/MIN/1.73 M 2
GLOBULIN SER CALC-MCNC: 3.4 G/DL (ref 1.9–3.5)
GLUCOSE SERPL-MCNC: 121 MG/DL (ref 65–99)
GLUCOSE UR STRIP.AUTO-MCNC: NEGATIVE MG/DL
HCT VFR BLD AUTO: 45.4 % (ref 42–52)
HGB BLD-MCNC: 15 G/DL (ref 14–18)
HYALINE CASTS #/AREA URNS LPF: ABNORMAL /LPF
IMM GRANULOCYTES # BLD AUTO: 0.08 K/UL (ref 0–0.11)
IMM GRANULOCYTES NFR BLD AUTO: 0.5 % (ref 0–0.9)
KETONES UR STRIP.AUTO-MCNC: NEGATIVE MG/DL
LACTATE SERPL-SCNC: 0.9 MMOL/L (ref 0.5–2)
LACTATE SERPL-SCNC: 1.5 MMOL/L (ref 0.5–2)
LEUKOCYTE ESTERASE UR QL STRIP.AUTO: ABNORMAL
LYMPHOCYTES # BLD AUTO: 1.83 K/UL (ref 1–4.8)
LYMPHOCYTES NFR BLD: 12.3 % (ref 22–41)
MCH RBC QN AUTO: 28.9 PG (ref 27–33)
MCHC RBC AUTO-ENTMCNC: 33 G/DL (ref 32.3–36.5)
MCV RBC AUTO: 87.5 FL (ref 81.4–97.8)
MICRO URNS: ABNORMAL
MONOCYTES # BLD AUTO: 1.82 K/UL (ref 0–0.85)
MONOCYTES NFR BLD AUTO: 12.2 % (ref 0–13.4)
NEUTROPHILS # BLD AUTO: 11.11 K/UL (ref 1.82–7.42)
NEUTROPHILS NFR BLD: 74.6 % (ref 44–72)
NITRITE UR QL STRIP.AUTO: POSITIVE
NRBC # BLD AUTO: 0 K/UL
NRBC BLD-RTO: 0 /100 WBC (ref 0–0.2)
PH UR STRIP.AUTO: 7.5 [PH] (ref 5–8)
PLATELET # BLD AUTO: 347 K/UL (ref 164–446)
PMV BLD AUTO: 9.9 FL (ref 9–12.9)
POTASSIUM SERPL-SCNC: 4.6 MMOL/L (ref 3.6–5.5)
PROT SERPL-MCNC: 7.7 G/DL (ref 6–8.2)
PROT UR QL STRIP: 30 MG/DL
RBC # BLD AUTO: 5.19 M/UL (ref 4.7–6.1)
RBC # URNS HPF: ABNORMAL /HPF
RBC UR QL AUTO: ABNORMAL
SODIUM SERPL-SCNC: 138 MMOL/L (ref 135–145)
SP GR UR STRIP.AUTO: 1.02
UROBILINOGEN UR STRIP.AUTO-MCNC: 1 MG/DL
WBC # BLD AUTO: 14.9 K/UL (ref 4.8–10.8)
WBC #/AREA URNS HPF: ABNORMAL /HPF

## 2024-04-28 PROCEDURE — 700105 HCHG RX REV CODE 258: Performed by: EMERGENCY MEDICINE

## 2024-04-28 PROCEDURE — 87086 URINE CULTURE/COLONY COUNT: CPT

## 2024-04-28 PROCEDURE — 700102 HCHG RX REV CODE 250 W/ 637 OVERRIDE(OP): Performed by: EMERGENCY MEDICINE

## 2024-04-28 PROCEDURE — 85025 COMPLETE CBC W/AUTO DIFF WBC: CPT

## 2024-04-28 PROCEDURE — 36415 COLL VENOUS BLD VENIPUNCTURE: CPT

## 2024-04-28 PROCEDURE — 83605 ASSAY OF LACTIC ACID: CPT

## 2024-04-28 PROCEDURE — 700111 HCHG RX REV CODE 636 W/ 250 OVERRIDE (IP): Performed by: EMERGENCY MEDICINE

## 2024-04-28 PROCEDURE — 71045 X-RAY EXAM CHEST 1 VIEW: CPT

## 2024-04-28 PROCEDURE — 87077 CULTURE AEROBIC IDENTIFY: CPT

## 2024-04-28 PROCEDURE — 87186 SC STD MICRODIL/AGAR DIL: CPT

## 2024-04-28 PROCEDURE — 96374 THER/PROPH/DIAG INJ IV PUSH: CPT

## 2024-04-28 PROCEDURE — A9270 NON-COVERED ITEM OR SERVICE: HCPCS | Performed by: EMERGENCY MEDICINE

## 2024-04-28 PROCEDURE — 80053 COMPREHEN METABOLIC PANEL: CPT

## 2024-04-28 PROCEDURE — 99284 EMERGENCY DEPT VISIT MOD MDM: CPT

## 2024-04-28 PROCEDURE — 87040 BLOOD CULTURE FOR BACTERIA: CPT | Mod: 91

## 2024-04-28 PROCEDURE — 81001 URINALYSIS AUTO W/SCOPE: CPT

## 2024-04-28 RX ORDER — SODIUM CHLORIDE, SODIUM LACTATE, POTASSIUM CHLORIDE, CALCIUM CHLORIDE 600; 310; 30; 20 MG/100ML; MG/100ML; MG/100ML; MG/100ML
1000 INJECTION, SOLUTION INTRAVENOUS ONCE
Status: COMPLETED | OUTPATIENT
Start: 2024-04-28 | End: 2024-04-28

## 2024-04-28 RX ORDER — ACETAMINOPHEN 325 MG/1
650 TABLET ORAL ONCE
Status: COMPLETED | OUTPATIENT
Start: 2024-04-28 | End: 2024-04-28

## 2024-04-28 RX ORDER — CEFAZOLIN 2 G/1
2 INJECTION, POWDER, FOR SOLUTION INTRAMUSCULAR; INTRAVENOUS ONCE
Status: COMPLETED | OUTPATIENT
Start: 2024-04-28 | End: 2024-04-28

## 2024-04-28 RX ORDER — CEFDINIR 300 MG/1
300 CAPSULE ORAL 2 TIMES DAILY
Qty: 20 CAPSULE | Refills: 0 | Status: ON HOLD | OUTPATIENT
Start: 2024-04-28 | End: 2024-05-15

## 2024-04-28 RX ADMIN — ACETAMINOPHEN 650 MG: 325 TABLET, FILM COATED ORAL at 11:38

## 2024-04-28 RX ADMIN — CEFAZOLIN 2 G: 2 INJECTION, POWDER, FOR SOLUTION INTRAMUSCULAR; INTRAVENOUS at 10:53

## 2024-04-28 RX ADMIN — SODIUM CHLORIDE, POTASSIUM CHLORIDE, SODIUM LACTATE AND CALCIUM CHLORIDE 1000 ML: 600; 310; 30; 20 INJECTION, SOLUTION INTRAVENOUS at 10:58

## 2024-04-28 ASSESSMENT — FIBROSIS 4 INDEX: FIB4 SCORE: 0.81

## 2024-04-28 ASSESSMENT — PAIN DESCRIPTION - PAIN TYPE: TYPE: ACUTE PAIN

## 2024-04-28 NOTE — ED NOTES
Pt states he feels a little better and is requesting to be discharged. ERP aware. Pt amb to br w/out difficulty.

## 2024-04-28 NOTE — ED TRIAGE NOTES
Chief Complaint   Patient presents with    Flank Pain     Bilateral flank pain, noticed it yesterday. R worse than L. Hx pyelonephritis/UTI.     Fever     Up to 103 at home. Tylenol last night.      Pt ambulates to triage for above.    Temp 102 on arrival. Sepsis protocol initiated. Pt in nad at this time, educated on triage process.

## 2024-04-28 NOTE — ED PROVIDER NOTES
"ED Provider Note    CHIEF COMPLAINT  Chief Complaint   Patient presents with    Flank Pain     Bilateral flank pain, noticed it yesterday. R worse than L. Hx pyelonephritis/UTI.     Fever     Up to 103 at home. Tylenol last night.        EXTERNAL RECORDS REVIEWED  Other no urine cultures available    HPI/ROS  LIMITATION TO HISTORY   Select: : None    OUTSIDE HISTORIAN(S):  none    Luis Carlos Singh is a 49 y.o. male who presents for fever for 1 week.      Tmax 102.6 this morning.  He reports urgency and incomplete emptying since his multiple back surgeries.  He \"drips urine\" and wears women's briefs for this issue.    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    Patient denies nausea, vomiting, diarrhea, injections in his back.    PAST MEDICAL HISTORY     Low back pain and multiple back surgeries and urinary retention    Tree of kidney stones    SURGICAL HISTORY   has a past surgical history that includes lumbar fusion anterior; carpal tunnel release (Left); lumbar fusion o-arm (Left, 3/5/2024); and lumbar laminectomy diskectomy (Bilateral, 3/5/2024).    FAMILY HISTORY  No family history on file.    SOCIAL HISTORY  Social History     Tobacco Use    Smoking status: Never    Smokeless tobacco: Never   Vaping Use    Vaping Use: Never used   Substance and Sexual Activity    Alcohol use: Yes     Comment: 12 pack beer/mo    Drug use: Not Currently    Sexual activity: Not on file       CURRENT MEDICATIONS  Home Medications       Reviewed by Nishi Cruz R.N. (Registered Nurse) on 04/28/24 at 0833  Med List Status: Partial     Medication Last Dose Status   albuterol 108 (90 Base) MCG/ACT Aero Soln inhalation aerosol  Active   gabapentin (NEURONTIN) 400 MG Cap  Active   NEEDLE, DISP, 18 G 18G X 1-1/2\" Misc  Active   testosterone cypionate (DEPO-TESTOSTERONE) 200 MG/ML injection  Active                    ALLERGIES  Allergies   Allergen Reactions    Penicillins " "Rash     > 40 years ago       PHYSICAL EXAM  VITAL SIGNS: /57   Pulse (!) 105   Temp 37.8 °C (100 °F) (Oral)   Resp 20   Ht 1.778 m (5' 10\")   Wt 121 kg (266 lb 1.5 oz)   SpO2 93%   BMI 38.18 kg/m²    General:  WD male with elevated BMI, nontoxic appearing in NAD; A+Ox3; V/S as above; febrile, tachycardic  Skin: warm and dry; good color; no rash  HEENT: NCAT; unable to close left eyelid; EOMs intact; PERRL; no scleral icterus   Neck: FROM  Cardiovascular: Tachycardic heart rate and regular rhythm.  No murmurs, rubs, or gallops; pulses 2+ bilaterally radially  Lungs: No respiratory distress or tachypnea; Clear to auscultation with good air movement bilaterally.  No wheezes, rhonchi, or rales.   Abdomen: soft; NTND; no rebound, guarding, or rigidity.  No organomegaly or pulsatile mass; no CVAT   Back: No midline tenderness, no mottling  Extremities: AGUILAR x 4; no e/o trauma; no pedal edema  Neurologic: CNs III-XII grossly intact with exception of inability to close left eye; speech clear  Psychiatric: Appropriate affect, normal mood      EKG/LABS  Results for orders placed or performed during the hospital encounter of 04/28/24   Lactic Acid   Result Value Ref Range    Lactic Acid 1.5 0.5 - 2.0 mmol/L   Lactic Acid   Result Value Ref Range    Lactic Acid 0.9 0.5 - 2.0 mmol/L   CBC with Differential   Result Value Ref Range    WBC 14.9 (H) 4.8 - 10.8 K/uL    RBC 5.19 4.70 - 6.10 M/uL    Hemoglobin 15.0 14.0 - 18.0 g/dL    Hematocrit 45.4 42.0 - 52.0 %    MCV 87.5 81.4 - 97.8 fL    MCH 28.9 27.0 - 33.0 pg    MCHC 33.0 32.3 - 36.5 g/dL    RDW 40.3 35.9 - 50.0 fL    Platelet Count 347 164 - 446 K/uL    MPV 9.9 9.0 - 12.9 fL    Neutrophils-Polys 74.60 (H) 44.00 - 72.00 %    Lymphocytes 12.30 (L) 22.00 - 41.00 %    Monocytes 12.20 0.00 - 13.40 %    Eosinophils 0.10 0.00 - 6.90 %    Basophils 0.30 0.00 - 1.80 %    Immature Granulocytes 0.50 0.00 - 0.90 %    Nucleated RBC 0.00 0.00 - 0.20 /100 WBC    Neutrophils " (Absolute) 11.11 (H) 1.82 - 7.42 K/uL    Lymphs (Absolute) 1.83 1.00 - 4.80 K/uL    Monos (Absolute) 1.82 (H) 0.00 - 0.85 K/uL    Eos (Absolute) 0.02 0.00 - 0.51 K/uL    Baso (Absolute) 0.05 0.00 - 0.12 K/uL    Immature Granulocytes (abs) 0.08 0.00 - 0.11 K/uL    NRBC (Absolute) 0.00 K/uL   Complete Metabolic Panel   Result Value Ref Range    Sodium 138 135 - 145 mmol/L    Potassium 4.6 3.6 - 5.5 mmol/L    Chloride 101 96 - 112 mmol/L    Co2 25 20 - 33 mmol/L    Anion Gap 12.0 7.0 - 16.0    Glucose 121 (H) 65 - 99 mg/dL    Bun 10 8 - 22 mg/dL    Creatinine 0.95 0.50 - 1.40 mg/dL    Calcium 9.4 8.5 - 10.5 mg/dL    Correct Calcium 9.2 8.5 - 10.5 mg/dL    AST(SGOT) 8 (L) 12 - 45 U/L    ALT(SGPT) 13 2 - 50 U/L    Alkaline Phosphatase 57 30 - 99 U/L    Total Bilirubin 0.8 0.1 - 1.5 mg/dL    Albumin 4.3 3.2 - 4.9 g/dL    Total Protein 7.7 6.0 - 8.2 g/dL    Globulin 3.4 1.9 - 3.5 g/dL    A-G Ratio 1.3 g/dL   Urinalysis    Specimen: Urine, Clean Catch   Result Value Ref Range    Color Yellow     Character Cloudy (A)     Specific Gravity 1.017 <1.035    Ph 7.5 5.0 - 8.0    Glucose Negative Negative mg/dL    Ketones Negative Negative mg/dL    Protein 30 (A) Negative mg/dL    Bilirubin Negative Negative    Urobilinogen, Urine 1.0 Negative    Nitrite Positive (A) Negative    Leukocyte Esterase Small (A) Negative    Occult Blood Trace (A) Negative    Micro Urine Req Microscopic    ESTIMATED GFR   Result Value Ref Range    GFR (CKD-EPI) 97 >60 mL/min/1.73 m 2   URINE MICROSCOPIC (W/UA)   Result Value Ref Range    WBC 20-50 (A) /hpf    RBC 5-10 (A) /hpf    Bacteria Many (A) None /hpf    Epithelial Cells Negative /hpf    Hyaline Cast 0-2 /lpf       I have independently interpreted this EKG    RADIOLOGY/PROCEDURES   I have independently interpreted the diagnostic imaging associated with this visit and am waiting the final reading from the radiologist.   My preliminary interpretation is as follows:   No focal  consolidation  Radiologist interpretation:  DX-CHEST-PORTABLE (1 VIEW)   Final Result      No acute process.          COURSE & MEDICAL DECISION MAKING    ASSESSMENT, COURSE AND PLAN  Care Narrative: This is a 49-year-old with difficulty emptying his bladder secondary to multiple back surgeries.  This has been an ongoing issue.  He now has a fever and suspects UTI.  He reports history of same.    Is febrile and tachycardic upon arrival.  Sepsis protocol initiated.  Urinalysis is positive for UTI.  Cultures are pending.  Ancef ordered per sepsis/UTI protocol.  Lactic acid was normal.  Patient was given normal saline bolus and Tylenol.  Patient's tachycardia improved but was still present.  I advised the patient of my recommendation for admission until cultures are back.  He states he must work for [employer] on the sp[employer] project.  He is unable to call in sick.  He declines admission.  He is aware that he may have bacteremia, not be on the correct antibiotic as we make an educated choice on what to start him with (no prior culture results available), and is at increased risk of sepsis which may lead to disability or death.    He opts to sign out AGAINST MEDICAL ADVICE.  I encouraged him to return whenever possible if he is not improving and is afebrile.  If he is unable to keep his antibiotics down, and vomiting, he needs to return sooner.    I do not suspect epidural abscess or cauda equina syndrome.  Suspect pneumonia.      Hydration: Based on the patient's presentation of Tachycardia the patient was given IV fluids. IV Hydration was used because oral hydration was not adequate alone. Upon recheck following hydration, the patient was slightly improved.        DISPOSITION AND DISCUSSIONS  Discussion of management with other [facility] or appropriate source(s): Pharmacy regarding Ancef rather than ceftriaxone for UTI      Escalation of care considered, and ultimately not performed:after discussion with the patient /  family, they have elected to decline an escalation in care      Decision tools and prescription drugs considered including, but not limited to: Antibiotics for UTI .    FINAL DIAGNOSIS  1. Acute UTI    Electronically signed by: Meghna Hastings M.D., 4/28/2024 10:30 AM

## 2024-04-28 NOTE — DISCHARGE INSTRUCTIONS
You have a urinary tract infection.  Your labs show that your body is fighting off a possible kidney infection and this can easily spread to your bloodstream.  You have received antibiotics here in the ER.  You have elected to leave AGAINST MEDICAL ADVICE.  We recommended that you stay for continued IV antibiotics until blood and urine cultures are returned.  You may have sepsis which is a life-threatening infection.  We have recommended that you stay in the hospital.  Please return to the ER for ongoing pain in your back, fever, vomiting, rash, dizziness, or other concerns.    Take cefdinir as prescribed for the urine infection.    Take Tylenol 650 mg every 4 hours as needed for pain and fever

## 2024-04-29 ENCOUNTER — APPOINTMENT (OUTPATIENT)
Dept: OCCUPATIONAL THERAPY | Facility: REHABILITATION | Age: 49
End: 2024-04-29
Attending: INTERNAL MEDICINE
Payer: COMMERCIAL

## 2024-04-29 LAB
BACTERIA BLD CULT: NORMAL
BACTERIA BLD CULT: NORMAL
SIGNIFICANT IND 70042: NORMAL
SIGNIFICANT IND 70042: NORMAL
SITE SITE: NORMAL
SITE SITE: NORMAL
SOURCE SOURCE: NORMAL
SOURCE SOURCE: NORMAL

## 2024-04-30 LAB
BACTERIA UR CULT: ABNORMAL
BACTERIA UR CULT: ABNORMAL
SIGNIFICANT IND 70042: ABNORMAL
SITE SITE: ABNORMAL
SOURCE SOURCE: ABNORMAL

## 2024-04-30 NOTE — ED NOTES
"ED Positive Culture Follow-up/Notification Note:    Date: 4/30/24     Patient seen in the ED on 4/28/2024 for evaluation of fever for 1 week. Per ED provider note, \"Tmax 102.6 this morning.  He reports urgency and incomplete emptying since his multiple back surgeries.  He \"drips urine\" and wears women's briefs for this issue. 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 Patient denies nausea, vomiting, diarrhea, injections in his back.\"  1. Acute UTI       Discharge Medication List as of 4/28/2024  1:16 PM        START taking these medications    Details   cefdinir (OMNICEF) 300 MG Cap Take 1 Capsule by mouth 2 times a day., Disp-20 Capsule, R-0, Normal             Allergies: Penicillins     Vitals:    04/28/24 1050 04/28/24 1103 04/28/24 1303 04/28/24 1309   BP:  122/58 111/57    Pulse: (!) 116 (!) 112 (!) 105    Resp: 16 17 20    Temp:    37.8 °C (100 °F)   TempSrc:    Oral   SpO2: 93% 93% 93%    Weight:       Height:           Final cultures:   Results       Procedure Component Value Units Date/Time    Urine Culture (New) [[id number]]  (Abnormal)  (Susceptibility) Collected: 04/28/24 0900    Order Status: Completed Specimen: Urine, Clean Catch Updated: 04/30/24 0918     Significant Indicator POS     Source UR     Site URINE, CLEAN CATCH     Culture Result Usual urogenital tonja 10-50,000 cfu/mL      Escherichia coli  >100,000 cfu/mL      Narrative:      Indication for culture:->Emergency Room Patient    Susceptibility       Escherichia coli (1)       Antibiotic Interpretation Microscan   Method Status    Amikacin  [*]  Sensitive <=16 mcg/mL GLORIA Final    Ampicillin Resistant >16 mcg/mL GLORIA Final    Amoxicillin/CA Sensitive <=8/4 mcg/mL GLORIA Final    Aztreonam  [*]  Sensitive <=4 mcg/mL GLORIA Final    Ceftolozane+Tazobactam  [*]  Sensitive <=2 mcg/mL GLORIA Final    Ceftriaxone Sensitive <=1 mcg/mL GLORIA Final    Ceftazidime  [*]  Sensitive <=1 mcg/mL GLORIA Final    " Cefazolin Sensitive <=2 mcg/mL GLORIA Final     Breakpoints when Cefazolin is used for therapy of infections  other than uncomplicated UTIs due to Enterobacterales are as  follows:  GLORIA and Interpretation:  <=2 S  4 I  >=8 R         Ciprofloxacin Sensitive <=0.25 mcg/mL GLORIA Final    Cefepime Sensitive <=2 mcg/mL GLORIA Final    Cefuroxime Sensitive <=4 mcg/mL GLORIA Final    Ceftazidime+Avibactam  [*]  Sensitive <=4 mcg/mL GLORIA Final    Ampicillin/sulbactam Resistant >16/8 mcg/mL GLORIA Final    Ertapenem  [*]  Sensitive <=0.5 mcg/mL GLORIA Final    Tobramycin Sensitive <=2 mcg/mL GLORIA Final    Nitrofurantoin Sensitive <=32 mcg/mL GLORIA Final    Gentamicin Sensitive <=2 mcg/mL GLORIA Final    Imipenem  [*]  Sensitive <=1 mcg/mL GLORIA Final    Levofloxacin Sensitive <=0.5 mcg/mL GLORIA Final    Meropenem  [*]  Sensitive <=1 mcg/mL GLORIA Final    Meropenem/Vaborbactam  [*]  Sensitive <=2 mcg/mL GLORIA Final    Minocycline Sensitive <=4 mcg/mL GLORIA Final    Pip/Tazobactam Sensitive <=8 mcg/mL GLORIA Final    Trimeth/Sulfa Resistant >2/38 mcg/mL GLORIA Final    Tetracycline  [*]  Resistant >8 mcg/mL GLORIA Final    Tigecycline Sensitive <=2 mcg/mL GLORIA Final               [*]  Suppressed Antibiotic                   Blood Culture - Draw one from central line and one from peripheral site [[id number]] Collected: 04/28/24 1040    Order Status: Completed Specimen: Blood from Peripheral Updated: 04/29/24 0635     Significant Indicator NEG     Source BLD     Site PERIPHERAL     Culture Result No Growth  Note: Blood cultures are incubated for 5 days and  are monitored continuously.Positive blood cultures  are called to the RN and reported as soon as  they are identified.      Narrative:      Right AC    Blood Culture - Draw one from central line and one from peripheral site [[id number]] Collected: 04/28/24 1048    Order Status: Completed Specimen: Blood from Line Updated: 04/29/24 0635     Significant Indicator NEG     Source BLD     Site Peripheral     Culture Result No  Growth  Note: Blood cultures are incubated for 5 days and  are monitored continuously.Positive blood cultures  are called to the RN and reported as soon as  they are identified.      Narrative:      Left AC    Urinalysis [[id number]]  (Abnormal) Collected: 04/28/24 0900    Order Status: Completed Specimen: Urine, Clean Catch Updated: 04/28/24 0957     Color Yellow     Character Cloudy     Specific Gravity 1.017     Ph 7.5     Glucose Negative mg/dL      Ketones Negative mg/dL      Protein 30 mg/dL      Bilirubin Negative     Urobilinogen, Urine 1.0     Nitrite Positive     Leukocyte Esterase Small     Occult Blood Trace     Micro Urine Req Microscopic            Plan:   Urine culture positive for Escherichia coli susceptible to cefdinir. Patient reports he has better energy, no kidney no pain or pain with urination, and his urine smell is back to normal. Patient reports he takes has been taking 200 mg of Tylenol every 4 hours and has had a couple of temperatures at 100 but other than that he feels much better. Recommended to finish his antibiotics and if he starts to feel poorly like he did or if he has another fever >100.3 he should return to the emergency department and he agrees.    Gregory Wheat, PharmD

## 2024-05-02 ENCOUNTER — APPOINTMENT (OUTPATIENT)
Dept: OCCUPATIONAL THERAPY | Facility: REHABILITATION | Age: 49
End: 2024-05-02
Attending: INTERNAL MEDICINE
Payer: COMMERCIAL

## 2024-05-06 ENCOUNTER — APPOINTMENT (OUTPATIENT)
Dept: OCCUPATIONAL THERAPY | Facility: REHABILITATION | Age: 49
End: 2024-05-06
Attending: INTERNAL MEDICINE
Payer: COMMERCIAL

## 2024-05-09 ENCOUNTER — APPOINTMENT (OUTPATIENT)
Dept: OCCUPATIONAL THERAPY | Facility: REHABILITATION | Age: 49
End: 2024-05-09
Attending: INTERNAL MEDICINE
Payer: COMMERCIAL

## 2024-05-13 ENCOUNTER — HOSPITAL ENCOUNTER (INPATIENT)
Facility: MEDICAL CENTER | Age: 49
LOS: 2 days | DRG: 872 | End: 2024-05-15
Attending: EMERGENCY MEDICINE | Admitting: HOSPITALIST
Payer: COMMERCIAL

## 2024-05-13 ENCOUNTER — APPOINTMENT (OUTPATIENT)
Dept: RADIOLOGY | Facility: MEDICAL CENTER | Age: 49
DRG: 872 | End: 2024-05-13
Attending: EMERGENCY MEDICINE
Payer: COMMERCIAL

## 2024-05-13 ENCOUNTER — APPOINTMENT (OUTPATIENT)
Dept: OCCUPATIONAL THERAPY | Facility: REHABILITATION | Age: 49
End: 2024-05-13
Attending: INTERNAL MEDICINE
Payer: COMMERCIAL

## 2024-05-13 DIAGNOSIS — N39.0 ACUTE UTI: ICD-10-CM

## 2024-05-13 DIAGNOSIS — A41.9 SEPSIS, DUE TO UNSPECIFIED ORGANISM, UNSPECIFIED WHETHER ACUTE ORGAN DYSFUNCTION PRESENT (HCC): ICD-10-CM

## 2024-05-13 PROBLEM — R65.10 SIRS (SYSTEMIC INFLAMMATORY RESPONSE SYNDROME) (HCC): Status: ACTIVE | Noted: 2024-05-13

## 2024-05-13 LAB
ALBUMIN SERPL BCP-MCNC: 4.4 G/DL (ref 3.2–4.9)
ALBUMIN/GLOB SERPL: 1.6 G/DL
ALP SERPL-CCNC: 62 U/L (ref 30–99)
ALT SERPL-CCNC: 29 U/L (ref 2–50)
ANION GAP SERPL CALC-SCNC: 14 MMOL/L (ref 7–16)
APPEARANCE UR: CLEAR
AST SERPL-CCNC: 18 U/L (ref 12–45)
BACTERIA #/AREA URNS HPF: ABNORMAL /HPF
BASOPHILS # BLD AUTO: 0.3 % (ref 0–1.8)
BASOPHILS # BLD: 0.06 K/UL (ref 0–0.12)
BILIRUB SERPL-MCNC: 0.7 MG/DL (ref 0.1–1.5)
BILIRUB UR QL STRIP.AUTO: NEGATIVE
BUN SERPL-MCNC: 12 MG/DL (ref 8–22)
CALCIUM ALBUM COR SERPL-MCNC: 8.9 MG/DL (ref 8.5–10.5)
CALCIUM SERPL-MCNC: 9.2 MG/DL (ref 8.5–10.5)
CHLORIDE SERPL-SCNC: 102 MMOL/L (ref 96–112)
CO2 SERPL-SCNC: 21 MMOL/L (ref 20–33)
COLOR UR: YELLOW
CREAT SERPL-MCNC: 0.89 MG/DL (ref 0.5–1.4)
EKG IMPRESSION: NORMAL
EOSINOPHIL # BLD AUTO: 0.02 K/UL (ref 0–0.51)
EOSINOPHIL NFR BLD: 0.1 % (ref 0–6.9)
EPI CELLS #/AREA URNS HPF: ABNORMAL /HPF
ERYTHROCYTE [DISTWIDTH] IN BLOOD BY AUTOMATED COUNT: 40.5 FL (ref 35.9–50)
GFR SERPLBLD CREATININE-BSD FMLA CKD-EPI: 104 ML/MIN/1.73 M 2
GLOBULIN SER CALC-MCNC: 2.7 G/DL (ref 1.9–3.5)
GLUCOSE SERPL-MCNC: 117 MG/DL (ref 65–99)
GLUCOSE UR STRIP.AUTO-MCNC: NEGATIVE MG/DL
HCT VFR BLD AUTO: 44.6 % (ref 42–52)
HGB BLD-MCNC: 15.1 G/DL (ref 14–18)
HYALINE CASTS #/AREA URNS LPF: ABNORMAL /LPF
IMM GRANULOCYTES # BLD AUTO: 0.1 K/UL (ref 0–0.11)
IMM GRANULOCYTES NFR BLD AUTO: 0.5 % (ref 0–0.9)
INR PPP: 1.13 (ref 0.87–1.13)
KETONES UR STRIP.AUTO-MCNC: ABNORMAL MG/DL
LACTATE SERPL-SCNC: 1 MMOL/L (ref 0.5–2)
LACTATE SERPL-SCNC: 1.5 MMOL/L (ref 0.5–2)
LACTATE SERPL-SCNC: 2.3 MMOL/L (ref 0.5–2)
LEUKOCYTE ESTERASE UR QL STRIP.AUTO: ABNORMAL
LYMPHOCYTES # BLD AUTO: 2.11 K/UL (ref 1–4.8)
LYMPHOCYTES NFR BLD: 10.1 % (ref 22–41)
MCH RBC QN AUTO: 28.4 PG (ref 27–33)
MCHC RBC AUTO-ENTMCNC: 33.9 G/DL (ref 32.3–36.5)
MCV RBC AUTO: 84 FL (ref 81.4–97.8)
MICRO URNS: ABNORMAL
MONOCYTES # BLD AUTO: 1.69 K/UL (ref 0–0.85)
MONOCYTES NFR BLD AUTO: 8.1 % (ref 0–13.4)
NEUTROPHILS # BLD AUTO: 16.9 K/UL (ref 1.82–7.42)
NEUTROPHILS NFR BLD: 80.9 % (ref 44–72)
NITRITE UR QL STRIP.AUTO: POSITIVE
NRBC # BLD AUTO: 0 K/UL
NRBC BLD-RTO: 0 /100 WBC (ref 0–0.2)
PH UR STRIP.AUTO: 6.5 [PH] (ref 5–8)
PLATELET # BLD AUTO: 233 K/UL (ref 164–446)
PMV BLD AUTO: 10.7 FL (ref 9–12.9)
POTASSIUM SERPL-SCNC: 3.9 MMOL/L (ref 3.6–5.5)
PROT SERPL-MCNC: 7.1 G/DL (ref 6–8.2)
PROT UR QL STRIP: 30 MG/DL
PROTHROMBIN TIME: 14.7 SEC (ref 12–14.6)
RBC # BLD AUTO: 5.31 M/UL (ref 4.7–6.1)
RBC # URNS HPF: ABNORMAL /HPF
RBC UR QL AUTO: NEGATIVE
SODIUM SERPL-SCNC: 137 MMOL/L (ref 135–145)
SP GR UR STRIP.AUTO: 1.02
UROBILINOGEN UR STRIP.AUTO-MCNC: 1 MG/DL
WBC # BLD AUTO: 20.9 K/UL (ref 4.8–10.8)
WBC #/AREA URNS HPF: ABNORMAL /HPF

## 2024-05-13 PROCEDURE — 99222 1ST HOSP IP/OBS MODERATE 55: CPT | Performed by: HOSPITALIST

## 2024-05-13 RX ORDER — ACETAMINOPHEN 325 MG/1
650 TABLET ORAL ONCE
Status: COMPLETED | OUTPATIENT
Start: 2024-05-13 | End: 2024-05-13

## 2024-05-13 RX ORDER — POLYETHYLENE GLYCOL 3350 17 G/17G
1 POWDER, FOR SOLUTION ORAL
Status: DISCONTINUED | OUTPATIENT
Start: 2024-05-13 | End: 2024-05-15 | Stop reason: HOSPADM

## 2024-05-13 RX ORDER — CEFTRIAXONE 2 G/1
2000 INJECTION, POWDER, FOR SOLUTION INTRAMUSCULAR; INTRAVENOUS ONCE
Status: COMPLETED | OUTPATIENT
Start: 2024-05-13 | End: 2024-05-13

## 2024-05-13 RX ORDER — SODIUM CHLORIDE 9 MG/ML
500 INJECTION, SOLUTION INTRAVENOUS ONCE
Status: COMPLETED | OUTPATIENT
Start: 2024-05-13 | End: 2024-05-13

## 2024-05-13 RX ORDER — ONDANSETRON 4 MG/1
4 TABLET, ORALLY DISINTEGRATING ORAL EVERY 4 HOURS PRN
Status: DISCONTINUED | OUTPATIENT
Start: 2024-05-13 | End: 2024-05-15 | Stop reason: HOSPADM

## 2024-05-13 RX ORDER — SODIUM CHLORIDE, SODIUM LACTATE, POTASSIUM CHLORIDE, AND CALCIUM CHLORIDE .6; .31; .03; .02 G/100ML; G/100ML; G/100ML; G/100ML
500 INJECTION, SOLUTION INTRAVENOUS
Status: DISCONTINUED | OUTPATIENT
Start: 2024-05-13 | End: 2024-05-15

## 2024-05-13 RX ORDER — IBUPROFEN 200 MG
200-400 TABLET ORAL EVERY 6 HOURS PRN
COMMUNITY

## 2024-05-13 RX ORDER — ACETAMINOPHEN 325 MG/1
650 TABLET ORAL EVERY 6 HOURS PRN
Status: DISCONTINUED | OUTPATIENT
Start: 2024-05-13 | End: 2024-05-14

## 2024-05-13 RX ORDER — AMOXICILLIN 250 MG
2 CAPSULE ORAL 2 TIMES DAILY
Status: DISCONTINUED | OUTPATIENT
Start: 2024-05-13 | End: 2024-05-15 | Stop reason: HOSPADM

## 2024-05-13 RX ORDER — SODIUM CHLORIDE, SODIUM LACTATE, POTASSIUM CHLORIDE, AND CALCIUM CHLORIDE .6; .31; .03; .02 G/100ML; G/100ML; G/100ML; G/100ML
30 INJECTION, SOLUTION INTRAVENOUS ONCE
Status: COMPLETED | OUTPATIENT
Start: 2024-05-13 | End: 2024-05-13

## 2024-05-13 RX ORDER — ONDANSETRON 2 MG/ML
4 INJECTION INTRAMUSCULAR; INTRAVENOUS EVERY 4 HOURS PRN
Status: DISCONTINUED | OUTPATIENT
Start: 2024-05-13 | End: 2024-05-15 | Stop reason: HOSPADM

## 2024-05-13 RX ORDER — PROMETHAZINE HYDROCHLORIDE 25 MG/1
12.5-25 SUPPOSITORY RECTAL EVERY 4 HOURS PRN
Status: DISCONTINUED | OUTPATIENT
Start: 2024-05-13 | End: 2024-05-15 | Stop reason: HOSPADM

## 2024-05-13 RX ORDER — PROMETHAZINE HYDROCHLORIDE 25 MG/1
12.5-25 TABLET ORAL EVERY 4 HOURS PRN
Status: DISCONTINUED | OUTPATIENT
Start: 2024-05-13 | End: 2024-05-15 | Stop reason: HOSPADM

## 2024-05-13 RX ORDER — PROCHLORPERAZINE EDISYLATE 5 MG/ML
5-10 INJECTION INTRAMUSCULAR; INTRAVENOUS EVERY 4 HOURS PRN
Status: DISCONTINUED | OUTPATIENT
Start: 2024-05-13 | End: 2024-05-15 | Stop reason: HOSPADM

## 2024-05-13 RX ADMIN — CEFTRIAXONE SODIUM 2000 MG: 2 INJECTION, POWDER, FOR SOLUTION INTRAMUSCULAR; INTRAVENOUS at 13:27

## 2024-05-13 RX ADMIN — SODIUM CHLORIDE 500 ML: 9 INJECTION, SOLUTION INTRAVENOUS at 18:45

## 2024-05-13 RX ADMIN — ACETAMINOPHEN 650 MG: 325 TABLET, FILM COATED ORAL at 16:00

## 2024-05-13 RX ADMIN — ACETAMINOPHEN 650 MG: 325 TABLET, FILM COATED ORAL at 16:02

## 2024-05-13 RX ADMIN — SODIUM CHLORIDE, POTASSIUM CHLORIDE, SODIUM LACTATE AND CALCIUM CHLORIDE 2190 ML: 600; 310; 30; 20 INJECTION, SOLUTION INTRAVENOUS at 13:28

## 2024-05-13 ASSESSMENT — ENCOUNTER SYMPTOMS
SHORTNESS OF BREATH: 0
WEAKNESS: 0
DIZZINESS: 0
DOUBLE VISION: 0
BRUISES/BLEEDS EASILY: 0
NECK PAIN: 0
PALPITATIONS: 0
NAUSEA: 0
VOMITING: 0
MYALGIAS: 0
DEPRESSION: 0
BLURRED VISION: 0
HEADACHES: 0
SORE THROAT: 0
INSOMNIA: 0
COUGH: 0
FEVER: 0

## 2024-05-13 ASSESSMENT — PATIENT HEALTH QUESTIONNAIRE - PHQ9
1. LITTLE INTEREST OR PLEASURE IN DOING THINGS: NOT AT ALL
2. FEELING DOWN, DEPRESSED, IRRITABLE, OR HOPELESS: NOT AT ALL
1. LITTLE INTEREST OR PLEASURE IN DOING THINGS: NOT AT ALL
SUM OF ALL RESPONSES TO PHQ9 QUESTIONS 1 AND 2: 0
SUM OF ALL RESPONSES TO PHQ9 QUESTIONS 1 AND 2: 0
2. FEELING DOWN, DEPRESSED, IRRITABLE, OR HOPELESS: NOT AT ALL

## 2024-05-13 ASSESSMENT — COGNITIVE AND FUNCTIONAL STATUS - GENERAL
SUGGESTED CMS G CODE MODIFIER MOBILITY: CH
MOBILITY SCORE: 24
DAILY ACTIVITIY SCORE: 24
SUGGESTED CMS G CODE MODIFIER DAILY ACTIVITY: CH

## 2024-05-13 ASSESSMENT — LIFESTYLE VARIABLES
TOTAL SCORE: 0
HAVE PEOPLE ANNOYED YOU BY CRITICIZING YOUR DRINKING: NO
AVERAGE NUMBER OF DAYS PER WEEK YOU HAVE A DRINK CONTAINING ALCOHOL: 0
TOTAL SCORE: 0
ON A TYPICAL DAY WHEN YOU DRINK ALCOHOL HOW MANY DRINKS DO YOU HAVE: 0
HAVE YOU EVER FELT YOU SHOULD CUT DOWN ON YOUR DRINKING: NO
CONSUMPTION TOTAL: NEGATIVE
EVER FELT BAD OR GUILTY ABOUT YOUR DRINKING: NO
TOTAL SCORE: 0
ALCOHOL_USE: NO
EVER HAD A DRINK FIRST THING IN THE MORNING TO STEADY YOUR NERVES TO GET RID OF A HANGOVER: NO
DOES PATIENT WANT TO STOP DRINKING: NO
HOW MANY TIMES IN THE PAST YEAR HAVE YOU HAD 5 OR MORE DRINKS IN A DAY: 0

## 2024-05-13 ASSESSMENT — FIBROSIS 4 INDEX
FIB4 SCORE: 0.73
FIB4 SCORE: 0.33
FIB4 SCORE: 0.73
FIB4 SCORE: 0.73

## 2024-05-13 ASSESSMENT — PAIN DESCRIPTION - PAIN TYPE: TYPE: ACUTE PAIN

## 2024-05-13 NOTE — H&P
Hospital Medicine History & Physical Note    Date of Service  5/13/2024    Primary Care Physician  Jackie Espinosa P.A.-C.    Consultants  None    Code Status  Full Code    Chief Complaint  Chief Complaint   Patient presents with    Fever    Joint Pain     Pt reports started this morning.        History of Presenting Illness  Luis Carlos Singh is a 49 y.o. male, with prior history of cauda equina syndrome status post L2/3 laminectomy with L2-L4 fusion in March 2024, who presented to the emergency department on 5/13/2024 with fever/chills, generalized bodyaches, dysuria and urgency.  Symptoms a started 2 days ago and are getting progressively worse.  He was diagnosed with UTI late April, recommended admission but left AMA with oral antibiotics.  He is once again feeling unwell and came to the ED for further evaluation    I discussed the plan of care with patient and ERP Dr. Mujica .    Review of Systems  Review of Systems   Constitutional:  Negative for fever.   HENT:  Negative for congestion and sore throat.    Eyes:  Negative for blurred vision and double vision.   Respiratory:  Negative for cough and shortness of breath.    Cardiovascular:  Negative for chest pain and palpitations.   Gastrointestinal:  Negative for nausea and vomiting.   Genitourinary:  Positive for dysuria and urgency.   Musculoskeletal:  Negative for myalgias and neck pain.   Skin:  Negative for itching and rash.   Neurological:  Negative for dizziness, weakness and headaches.   Endo/Heme/Allergies:  Does not bruise/bleed easily.   Psychiatric/Behavioral:  Negative for depression. The patient does not have insomnia.        Past Medical History   has no past medical history on file.    Surgical History   has a past surgical history that includes lumbar fusion anterior; carpal tunnel release (Left); lumbar fusion o-arm (Left, 3/5/2024); and lumbar laminectomy diskectomy (Bilateral, 3/5/2024).     Family History  Reviewed and not  "pertinenet  Family history reviewed with patient. There is no family history that is pertinent to the chief complaint.     Social History   reports that he has never smoked. He has never used smokeless tobacco. He reports that he does not currently use alcohol. He reports that he does not currently use drugs.    Allergies  Allergies   Allergen Reactions    Penicillins Rash     Unknown reaction, mother told him > 40 years ago       Medications  Prior to Admission Medications   Prescriptions Last Dose Informant Patient Reported? Taking?   NEEDLE, DISP, 18 G 18G X 1-1/2\" Misc SUPPLIES at SUPPLIES Patient No No   Sig: Use one needle weekly for testosterone   albuterol 108 (90 Base) MCG/ACT Aero Soln inhalation aerosol PRN at ON HAND Patient No No   Sig: Inhale 2 Puffs every four hours as needed for Shortness of Breath.   cefdinir (OMNICEF) 300 MG Cap 2024 at COMPLETED Patient No No   Sig: Take 1 Capsule by mouth 2 times a day.   ibuprofen (MOTRIN) 200 MG Tab 2024 at 1100 Patient Yes Yes   Sig: Take 200-400 mg by mouth every 6 hours as needed for Fever.   testosterone cypionate (DEPO-TESTOSTERONE) 200 MG/ML injection 2024 at OUT Patient Yes No   Si mg every Wednesday. 0.7 ml = 140 mg      Facility-Administered Medications: None       Physical Exam  Temp:  [39.1 °C (102.3 °F)] 39.1 °C (102.3 °F)  Pulse:  [121] 121  Resp:  [18] 18  BP: (107)/(80) 107/80  SpO2:  [94 %] 94 %  Blood Pressure: 107/80   Temperature: (!) 39.1 °C (102.3 °F)   Pulse: (!) 121   Respiration: 18   Pulse Oximetry: 94 %       Physical Exam  Constitutional:       Appearance: Normal appearance.   HENT:      Head: Normocephalic and atraumatic.      Nose: Nose normal.      Mouth/Throat:      Mouth: Mucous membranes are moist.      Pharynx: Oropharynx is clear.   Eyes:      Extraocular Movements: Extraocular movements intact.      Pupils: Pupils are equal, round, and reactive to light.   Cardiovascular:      Rate and Rhythm: Regular " rhythm. Tachycardia present.      Pulses: Normal pulses.      Heart sounds: Normal heart sounds.   Pulmonary:      Effort: Pulmonary effort is normal.      Breath sounds: Normal breath sounds.   Abdominal:      General: Abdomen is flat. Bowel sounds are normal.      Palpations: Abdomen is soft.      Tenderness: There is abdominal tenderness (Mild diffuse).   Musculoskeletal:      Cervical back: Normal range of motion and neck supple.   Skin:     General: Skin is warm and dry.   Neurological:      General: No focal deficit present.      Mental Status: He is alert and oriented to person, place, and time.   Psychiatric:         Mood and Affect: Mood normal.         Behavior: Behavior normal.         Laboratory:  Recent Labs     05/13/24  1203   WBC 20.9*   RBC 5.31   HEMOGLOBIN 15.1   HEMATOCRIT 44.6   MCV 84.0   MCH 28.4   MCHC 33.9   RDW 40.5   PLATELETCT 233   MPV 10.7     Recent Labs     05/13/24  1203   SODIUM 137   POTASSIUM 3.9   CHLORIDE 102   CO2 21   GLUCOSE 117*   BUN 12   CREATININE 0.89   CALCIUM 9.2     Recent Labs     05/13/24  1203   ALTSGPT 29   ASTSGOT 18   ALKPHOSPHAT 62   TBILIRUBIN 0.7   GLUCOSE 117*           Imaging:  DX-CHEST-PORTABLE (1 VIEW)   Final Result      Mild cardiomegaly.          X-Ray:  I have personally reviewed the images and compared with prior images. and My impression is: Chest Xray negative for pneumonia    Assessment/Plan:  Justification for Admission Status  I anticipate this patient will require at least two midnights for appropriate medical management, necessitating inpatient admission because 49-year-old male, UTI and SIRS      * SIRS (systemic inflammatory response syndrome) (HCC)- (present on admission)  Assessment & Plan  -Inpatient status to medical floor  SIRS criteria identified on my evaluation include:  Tachycardia, with heart rate greater than 90 BPM and Leukocytosis, with WBC greater than 12,000  SIRS due to acute UTI  -Patient was started on IV  antibiotics.  -Will monitor lactic acid.  He received full 30 mg/kg IV fluid resuscitation bolus in the ED.      Acute UTI  Assessment & Plan  -prior history of cauda equina syndrome status post L2/3 laminectomy with L2-L4 fusion in March 2024 and diagnosed with UTI late April.  -I suspect he has urinary retention underlying.  He might need to follow-up with urology outpatient.  I had a long conversation with him about this.  -He will be started on IV antibiotics.      Hyperglycemia- (present on admission)  Assessment & Plan  -Initial glucose 117, likely reactive due to above.  -Monitor chemistry panel in the morning, consider checking hemoglobin A1c if remains elevated.        VTE prophylaxis: SCDs/TEDs

## 2024-05-13 NOTE — ED TRIAGE NOTES
Chief Complaint   Patient presents with    Fever    Joint Pain     Pt reports started this morning.      Pt ambulatory to triage for above complaint. Pt was recently seen here for UTI and flank pain where pt was discharged and finished his antibiotics. Pt reports no flank pain or difficulty with urine currently, but has generalized pain throughout and a fever.

## 2024-05-13 NOTE — ED NOTES
Pt medicated per MAR, bladder scan for post void residual 144mls.  IV infusing without redness/swelling.

## 2024-05-13 NOTE — ED NOTES
Pt medicated for flank pain and fever.  Pt provided with PO fluids.  2nd Liter of 2190ml LR bolus infusing without redness/swelling. Family at bedside.

## 2024-05-13 NOTE — ED NOTES
Med rec completed per patient.  Allergies reviewed with patient.   - penicillin allergy unknown, mother told him and his siblings that they were allergic but does not believe he's had penicillin.   Home antibiotics in past 30 days:   - Cefdinir 300mg BID x10d, completed 5/8   Anticoagulants/antiplatelets in past 14 days: none   Kim Melgar, Pharmacy Intern

## 2024-05-13 NOTE — ED PROVIDER NOTES
ED Provider Note    CHIEF COMPLAINT  Chief Complaint   Patient presents with    Fever    Joint Pain     Pt reports started this morning.        EXTERNAL RECORDS REVIEWED  Inpatient Notes patient was admitted to the hospital 3/4/2024 to 3/8/2024 for cauda equina syndrome he underwent L2-3 laminectomy with Stealth L2-4 fusion on 3/5/2024 with neurosurgery he was discharged home to follow-up with Dr. Lauren, neurosurgery    HPI/ROS  LIMITATION TO HISTORY   Select: : None  OUTSIDE HISTORIAN(S):  none    Luis Carlos Singh is a 49 y.o. male who presents complaining of fevers chills diffuse joint pain and bodyaches that started this morning.  Patient has not noticed dysuria frequency or urgency.  He feels very weak and dizzy.  He denies any sore throat cough or cold symptoms.  The patient states that he did have cauda equina syndrome and spinal surgery March of this year and ever since then has had trouble emptying his bladder completely.  He is already been seen once for a bladder infection in April and was treated with antibiotics and refused to stay in the hospital at that time.  He does not self cath.  He has not any blood thinning medications.  Denies any chest pains or shortness of breath.  He is not diabetic.    PAST MEDICAL HISTORY       SURGICAL HISTORY   has a past surgical history that includes lumbar fusion anterior; carpal tunnel release (Left); lumbar fusion o-arm (Left, 3/5/2024); and lumbar laminectomy diskectomy (Bilateral, 3/5/2024).    FAMILY HISTORY  History reviewed. No pertinent family history.    SOCIAL HISTORY  Social History     Tobacco Use    Smoking status: Never    Smokeless tobacco: Never   Vaping Use    Vaping Use: Never used   Substance and Sexual Activity    Alcohol use: Not Currently    Drug use: Not Currently    Sexual activity: Not on file       CURRENT MEDICATIONS  Home Medications       Reviewed by Kim Melgar, Pharmacy Intern (Pharmacy Intern) on 05/13/24 at 7691  Med List  "Status: Complete     Medication Last Dose Status   albuterol 108 (90 Base) MCG/ACT Aero Soln inhalation aerosol PRN Active   cefdinir (OMNICEF) 300 MG Cap 5/8/2024 Active   ibuprofen (MOTRIN) 200 MG Tab 5/13/2024 Active   NEEDLE, DISP, 18 G 18G X 1-1/2\" Misc SUPPLIES Active   testosterone cypionate (DEPO-TESTOSTERONE) 200 MG/ML injection 5/1/2024 Active                    ALLERGIES  Allergies   Allergen Reactions    Penicillins Rash     Unknown reaction, mother told him > 40 years ago       PHYSICAL EXAM  VITAL SIGNS: /80   Pulse (!) 121   Temp (!) 39.1 °C (102.3 °F) (Oral)   Resp 18   Ht 1.778 m (5' 10\")   Wt 121 kg (266 lb 12.1 oz)   SpO2 94%   BMI 38.28 kg/m²      Constitutional: Well developed, Well nourished, No acute distress, Non-toxic appearance.   HEENT: Normocephalic, Atraumatic,  external ears normal, pharynx pink,  Mucous  Membranes moist, No rhinorrhea or mucosal edema  Eyes: PERRL, EOMI, Conjunctiva normal, No discharge.   Neck: Normal range of motion, No tenderness, Supple, No stridor.   Lymphatic: No lymphadenopathy    Cardiovascular: Tachycardic regular rhythm, No murmurs,  rubs, or gallops.   Thorax & Lungs: Lungs clear to auscultation bilaterally, No respiratory distress, No wheezes, rhales or rhonchi, No chest wall tenderness.   Abdomen: Bowel sounds normal, Soft, non tender, non distended,  No pulsatile masses., no rebound guarding or peritoneal signs.   Skin: Warm, Dry, No erythema, No rash,   Back:  No CVA tenderness,  No spinal tenderness, bony crepitance step offs or instability.   Extremities: Equal, intact distal pulses, No cyanosis, clubbing or edema,  No tenderness.   Musculoskeletal: Good range of motion in all major joints. No tenderness to palpation or major deformities noted.   Neurologic: Alert & oriented No focal deficits noted.  Psychiatric: Affect normal, Judgment normal, Mood normal.      EKG/LABS  Results for orders placed or performed during the hospital encounter " of 05/13/24   Lactic Acid   Result Value Ref Range    Lactic Acid 1.5 0.5 - 2.0 mmol/L   CBC with Differential   Result Value Ref Range    WBC 20.9 (H) 4.8 - 10.8 K/uL    RBC 5.31 4.70 - 6.10 M/uL    Hemoglobin 15.1 14.0 - 18.0 g/dL    Hematocrit 44.6 42.0 - 52.0 %    MCV 84.0 81.4 - 97.8 fL    MCH 28.4 27.0 - 33.0 pg    MCHC 33.9 32.3 - 36.5 g/dL    RDW 40.5 35.9 - 50.0 fL    Platelet Count 233 164 - 446 K/uL    MPV 10.7 9.0 - 12.9 fL    Neutrophils-Polys 80.90 (H) 44.00 - 72.00 %    Lymphocytes 10.10 (L) 22.00 - 41.00 %    Monocytes 8.10 0.00 - 13.40 %    Eosinophils 0.10 0.00 - 6.90 %    Basophils 0.30 0.00 - 1.80 %    Immature Granulocytes 0.50 0.00 - 0.90 %    Nucleated RBC 0.00 0.00 - 0.20 /100 WBC    Neutrophils (Absolute) 16.90 (H) 1.82 - 7.42 K/uL    Lymphs (Absolute) 2.11 1.00 - 4.80 K/uL    Monos (Absolute) 1.69 (H) 0.00 - 0.85 K/uL    Eos (Absolute) 0.02 0.00 - 0.51 K/uL    Baso (Absolute) 0.06 0.00 - 0.12 K/uL    Immature Granulocytes (abs) 0.10 0.00 - 0.11 K/uL    NRBC (Absolute) 0.00 K/uL   Complete Metabolic Panel   Result Value Ref Range    Sodium 137 135 - 145 mmol/L    Potassium 3.9 3.6 - 5.5 mmol/L    Chloride 102 96 - 112 mmol/L    Co2 21 20 - 33 mmol/L    Anion Gap 14.0 7.0 - 16.0    Glucose 117 (H) 65 - 99 mg/dL    Bun 12 8 - 22 mg/dL    Creatinine 0.89 0.50 - 1.40 mg/dL    Calcium 9.2 8.5 - 10.5 mg/dL    Correct Calcium 8.9 8.5 - 10.5 mg/dL    AST(SGOT) 18 12 - 45 U/L    ALT(SGPT) 29 2 - 50 U/L    Alkaline Phosphatase 62 30 - 99 U/L    Total Bilirubin 0.7 0.1 - 1.5 mg/dL    Albumin 4.4 3.2 - 4.9 g/dL    Total Protein 7.1 6.0 - 8.2 g/dL    Globulin 2.7 1.9 - 3.5 g/dL    A-G Ratio 1.6 g/dL   Urinalysis    Specimen: Urine   Result Value Ref Range    Color Yellow     Character Clear     Specific Gravity 1.024 <1.035    Ph 6.5 5.0 - 8.0    Glucose Negative Negative mg/dL    Ketones Trace (A) Negative mg/dL    Protein 30 (A) Negative mg/dL    Bilirubin Negative Negative    Urobilinogen, Urine 1.0  Negative    Nitrite Positive (A) Negative    Leukocyte Esterase Small (A) Negative    Occult Blood Negative Negative    Micro Urine Req Microscopic    ESTIMATED GFR   Result Value Ref Range    GFR (CKD-EPI) 104 >60 mL/min/1.73 m 2   URINE MICROSCOPIC (W/UA)   Result Value Ref Range    WBC  (A) /hpf    RBC 0-2 (A) /hpf    Bacteria Many (A) None /hpf    Epithelial Cells Few /hpf    Hyaline Cast 0-2 /lpf   EKG   Result Value Ref Range    Report       [facility] Emergency Dept.    Test Date:  2024  Pt Name:    ALENA SNIGH               Department: ER  MRN:        [id number]                      Room:  Gender:     Male                         Technician: [id number]  :        1975                   Requested By:ER TRIAGE PROTOCOL  Order #:    [id number]                    Reading MD: DALE KOWALSKI MD    Measurements  Intervals                                Axis  Rate:       113                          P:          53  DC:         149                          QRS:        33  QRSD:       90                           T:          26  QT:         316  QTc:        434    Interpretive Statements  Sinus tachycardia  No previous ECG available for comparison  Electronically Signed On 2024 13:08:50 PDT by DALE KOWALSKI MD         I have independently interpreted this EKG    RADIOLOGY/PROCEDURES   I have independently interpreted the diagnostic imaging associated with this visit and am waiting the final reading from the radiologist.   My preliminary interpretation is as follows: cxr no infiltrate or pleural effusion    Radiologist interpretation:  DX-CHEST-PORTABLE (1 VIEW)   Final Result      Mild cardiomegaly.          COURSE & MEDICAL DECISION MAKING    ASSESSMENT, COURSE AND PLAN  Care Narrative: Alena Singh is a 49 y.o. male who presents complaining of fevers chills diffuse joint pain and bodyaches that started this morning.  Patient has not noticed dysuria frequency or  urgency.  He feels very weak and dizzy.  He denies any sore throat cough or cold symptoms.  The patient states that he did have cauda equina syndrome and spinal surgery March of this year and ever since then has had trouble emptying his bladder completely.  He is already been seen once for a bladder infection in April and was treated with antibiotics and refused to stay in the hospital at that time.  He does not self cath.  He has not any blood thinning medications.  Denies any chest pains or shortness of breath.  He is not diabetic.  On physical exam he has no CVA tenderness his abdomen is soft and nontender heart is tachycardic lungs are clear to auscultation bilaterally skin is warm without rashes  Sepsis: Infection was suspected 12:51pm (Time). Sepsis pathway was initiated. 30cc/kg bolus given. Antibiotics were given per protocol.          ADDITIONAL PROBLEMS MANAGED      DISPOSITION AND DISCUSSIONS  IV was started and labs were drawn the patient was given IV fluids and antibiotics per the sepsis protocol.  I ordered Tylenol for his fever.    His white count is elevated at 20.9 hemoglobin 15 platelet count 233 with 80% neutrophils.  Comprehensive metabolic panel is a slight elevated glucose of 117 kidney function is normal with a BUN of 12 creatinine 0.89 liver function tests are normal electrolytes are normal.  Lactic acid is 1.5.  Urinalysis has positive nitrites small leukocyte esterase  white cells and many bacteria.  Postvoid he had 144 cc of urine left in his bladder.    Upon recheck after IV fluids patient's heart rate is improved.  He will be admitted for IV fluids IV antibiotics and further care.  I told him I suspect that he is getting urinary tract infection so frequently because of residual retention of urine since his spine injury.  The patient understands this.  He agrees to be admitted for IV antibiotics and further care.    I have discussed management of the patient with the following  physicians and GIDEON's: Hospitalist Dr. Donis who will admit the patient for IV antibiotics fluids and further care    Discussion of management with other QHP or appropriate source(s): Pharmacy regarding antibiotic choice      Escalation of care considered, and ultimately not performed: none    Barriers to care at this time, including but not limited to:  none.     Decision tools and prescription drugs considered including, but not limited to: Antibiotics rocephin and Pain Medications tylenol .    The patient will be admitted to the hospital in guarded condition.    CRITICAL CARE  The very real possibilty of a deterioration of this patient's condition required the highest level of my preparedness for sudden, emergent intervention.  I provided critical care services, which included medication orders, frequent reevaluations of the patient's condition and response to treatment, ordering and reviewing test results, and discussing the case with various consultants.  The critical care time associated with the care of the patient was 45 minutes. Review chart for interventions. This time is exclusive of any other billable procedures.       FINAL DIAGNOSIS  1. Acute UTI    2. Sepsis, due to unspecified organism, unspecified whether acute organ dysfunction present (HCC)           Electronically signed by: Vanesa Mujica M.D., 5/13/2024 12:51 PM

## 2024-05-14 ENCOUNTER — APPOINTMENT (OUTPATIENT)
Dept: RADIOLOGY | Facility: MEDICAL CENTER | Age: 49
DRG: 872 | End: 2024-05-14
Payer: COMMERCIAL

## 2024-05-14 PROBLEM — R33.9 URINARY RETENTION: Status: ACTIVE | Noted: 2024-05-14

## 2024-05-14 LAB
ANION GAP SERPL CALC-SCNC: 13 MMOL/L (ref 7–16)
BUN SERPL-MCNC: 9 MG/DL (ref 8–22)
CALCIUM SERPL-MCNC: 9.1 MG/DL (ref 8.5–10.5)
CHLORIDE SERPL-SCNC: 104 MMOL/L (ref 96–112)
CO2 SERPL-SCNC: 21 MMOL/L (ref 20–33)
CREAT SERPL-MCNC: 0.75 MG/DL (ref 0.5–1.4)
ERYTHROCYTE [DISTWIDTH] IN BLOOD BY AUTOMATED COUNT: 43.5 FL (ref 35.9–50)
GFR SERPLBLD CREATININE-BSD FMLA CKD-EPI: 109 ML/MIN/1.73 M 2
GLUCOSE SERPL-MCNC: 115 MG/DL (ref 65–99)
HCT VFR BLD AUTO: 47 % (ref 42–52)
HGB BLD-MCNC: 14.9 G/DL (ref 14–18)
LACTATE SERPL-SCNC: 0.8 MMOL/L (ref 0.5–2)
MCH RBC QN AUTO: 28.2 PG (ref 27–33)
MCHC RBC AUTO-ENTMCNC: 31.7 G/DL (ref 32.3–36.5)
MCV RBC AUTO: 88.8 FL (ref 81.4–97.8)
PLATELET # BLD AUTO: 205 K/UL (ref 164–446)
PMV BLD AUTO: 11.1 FL (ref 9–12.9)
POTASSIUM SERPL-SCNC: 4.3 MMOL/L (ref 3.6–5.5)
RBC # BLD AUTO: 5.29 M/UL (ref 4.7–6.1)
SODIUM SERPL-SCNC: 138 MMOL/L (ref 135–145)
WBC # BLD AUTO: 17.7 K/UL (ref 4.8–10.8)

## 2024-05-14 PROCEDURE — 99232 SBSQ HOSP IP/OBS MODERATE 35: CPT | Mod: GC | Performed by: INTERNAL MEDICINE

## 2024-05-14 RX ORDER — ACETAMINOPHEN 500 MG
1000 TABLET ORAL EVERY 6 HOURS PRN
Status: DISCONTINUED | OUTPATIENT
Start: 2024-05-14 | End: 2024-05-15 | Stop reason: HOSPADM

## 2024-05-14 RX ADMIN — ACETAMINOPHEN 1000 MG: 500 TABLET, FILM COATED ORAL at 22:06

## 2024-05-14 RX ADMIN — SENNOSIDES AND DOCUSATE SODIUM 2 TABLET: 50; 8.6 TABLET ORAL at 05:19

## 2024-05-14 RX ADMIN — CEFAZOLIN 2 G: 2 INJECTION, POWDER, FOR SOLUTION INTRAMUSCULAR; INTRAVENOUS at 14:13

## 2024-05-14 RX ADMIN — CEFAZOLIN 2 G: 2 INJECTION, POWDER, FOR SOLUTION INTRAMUSCULAR; INTRAVENOUS at 05:24

## 2024-05-14 RX ADMIN — CEFAZOLIN 2 G: 2 INJECTION, POWDER, FOR SOLUTION INTRAMUSCULAR; INTRAVENOUS at 22:03

## 2024-05-14 RX ADMIN — IOHEXOL 100 ML: 350 INJECTION, SOLUTION INTRAVENOUS at 13:45

## 2024-05-14 ASSESSMENT — PAIN DESCRIPTION - PAIN TYPE
TYPE: ACUTE PAIN;CHRONIC PAIN
TYPE: ACUTE PAIN

## 2024-05-14 ASSESSMENT — ENCOUNTER SYMPTOMS
SPUTUM PRODUCTION: 0
NEUROLOGICAL NEGATIVE: 1
COUGH: 0
DIAPHORESIS: 0
NAUSEA: 0
CONSTIPATION: 0
FEVER: 0
VOMITING: 0
MYALGIAS: 1
SHORTNESS OF BREATH: 0
FLANK PAIN: 1
CHILLS: 0

## 2024-05-14 ASSESSMENT — PATIENT HEALTH QUESTIONNAIRE - PHQ9
1. LITTLE INTEREST OR PLEASURE IN DOING THINGS: NOT AT ALL
2. FEELING DOWN, DEPRESSED, IRRITABLE, OR HOPELESS: NOT AT ALL
SUM OF ALL RESPONSES TO PHQ9 QUESTIONS 1 AND 2: 0

## 2024-05-14 ASSESSMENT — FIBROSIS 4 INDEX: FIB4 SCORE: 0.83

## 2024-05-14 NOTE — ASSESSMENT & PLAN NOTE
In the setting of recent cauda equina, s/p surgery. By the time he had the surgery his PVR were as high as 600ml.   Now PVR are in the 100-150ml.   - Continue bladder scans - PVR  - Will need urology outpatient

## 2024-05-14 NOTE — PROGRESS NOTES
[facility] Internal Medicine Daily Progress Note    Date of Service  5/14/2024    [facility] Team: [facility] Team   Attending: Dr. Berman  Senior Resident: Dr. Navarro  Intern:  Dr. Avila  Contact Number: [phone]    Chief Complaint  Luis Carlos Singh is a 49 y.o. male with fever/chills     Hospital Course  Luis Carlos Singh is a 49 y.o. male, with prior history of cauda equina syndrome status post L2/3 laminectomy with L2-L4 fusion in March 2024, and history of recurrent UTIs and episodic urinary incontinence, who presented to the emergency department on 5/13/2024 with fever/chills, generalized bodyaches, dysuria and urgency for about 2 days. He had finished Cefdinir for UTI on 5/8. He was found in SIRS: tachycardic, WBC 20K. UA with packed WBC, positive nitrites, LE and bacteria. Started on cefazolin.     Interval Problem Update  VS stable, afebrile.   Patient reports improved dysuria, PVR are around 150ml, able to void. No other symptoms. Feeling better.     I have discussed this patient's plan of care and discharge plan at IDT rounds today with Case Management, Nursing, Nursing leadership, and other members of the IDT team.    Consultants/Specialty  None     Code Status  Full Code    Disposition  The patient is not medically cleared for discharge to home or a post-acute facility.      I have placed the appropriate orders for post-discharge needs.    Review of Systems  Review of Systems   Constitutional:  Negative for chills, diaphoresis, fever and malaise/fatigue.   HENT:  Negative for congestion.    Respiratory:  Negative for cough, sputum production and shortness of breath.    Cardiovascular:  Negative for chest pain and leg swelling.   Gastrointestinal:  Negative for constipation, nausea and vomiting.   Genitourinary:  Positive for dysuria, flank pain and urgency. Negative for hematuria.   Musculoskeletal:  Positive for myalgias.   Neurological: Negative.         Physical Exam  Temp:  [35.9 °C (96.6  °F)-38.5 °C (101.3 °F)] 36.5 °C (97.7 °F)  Pulse:  [] 100  Resp:  [16-20] 18  BP: (105-142)/(58-86) 124/79  SpO2:  [92 %-98 %] 98 %    Physical Exam  Vitals and nursing note reviewed.   Constitutional:       Appearance: Normal appearance.   HENT:      Head: Normocephalic and atraumatic.      Mouth/Throat:      Mouth: Mucous membranes are moist.   Eyes:      Extraocular Movements: Extraocular movements intact.      Conjunctiva/sclera: Conjunctivae normal.   Cardiovascular:      Rate and Rhythm: Normal rate and regular rhythm.      Heart sounds: Normal heart sounds.   Pulmonary:      Effort: Pulmonary effort is normal.      Breath sounds: Normal breath sounds.   Abdominal:      General: Abdomen is flat. Bowel sounds are normal.      Palpations: Abdomen is soft.      Comments: No suprapubic pain, CVA tenderness bilaterally.    Musculoskeletal:         General: Normal range of motion.      Cervical back: Normal range of motion.   Skin:     General: Skin is warm.      Capillary Refill: Capillary refill takes less than 2 seconds.   Neurological:      Mental Status: He is alert. Mental status is at baseline.         Fluids    Intake/Output Summary (Last 24 hours) at 5/14/2024 1509  Last data filed at 5/14/2024 1000  Gross per 24 hour   Intake 772.09 ml   Output 1100 ml   Net -327.91 ml       Laboratory  Recent Labs     05/13/24  1203 05/14/24  0238   WBC 20.9* 17.7*   RBC 5.31 5.29   HEMOGLOBIN 15.1 14.9   HEMATOCRIT 44.6 47.0   MCV 84.0 88.8   MCH 28.4 28.2   MCHC 33.9 31.7*   RDW 40.5 43.5   PLATELETCT 233 205   MPV 10.7 11.1     Recent Labs     05/13/24  1203 05/14/24  0238   SODIUM 137 138   POTASSIUM 3.9 4.3   CHLORIDE 102 104   CO2 21 21   GLUCOSE 117* 115*   BUN 12 9   CREATININE 0.89 0.75   CALCIUM 9.2 9.1     Recent Labs     05/13/24  1449   INR 1.13               Imaging  DX-CHEST-PORTABLE (1 VIEW)   Final Result      Mild cardiomegaly.      CT-ABDOMEN-PELVIS WITH    (Results Pending)         Assessment/Plan:    * Acute UTI  Assessment & Plan  Reports recurrent UTIs in the context of prior urinary incontinency.   Recent cauda equina syndrome status post L2/3 laminectomy with L2-L4 fusion in March 2024 and diagnosed with UTI late April. Has had some urinary retention since then likely facilitating urinary tract infections.   Last E. Coli had R to ampicillin and TMP/SMX.  Finished recent treatment on 5/8 with Cefdinir. Possibly failed given bioavailability of this abx or there is a complication from prior UTI.   - Obtain CT abd/pelv with contrast to rule out kidney stones and perinephric abscess  - Continue Cefazolin for now, discussed with ID pharmacy   - Follow blood and urine cultures      SIRS (systemic inflammatory response syndrome) (HCC)- (present on admission)  Assessment & Plan  SIRS on admission:  Tachycardia, with heart rate greater than 90 BPM and Leukocytosis, with WBC greater than 12,000  SIRS due to acute UTI.  He received full 30 mg/kg IV fluid resuscitation bolus in the ED.  - See plan for UTI       Urinary retention  Assessment & Plan  In the setting of recent cauda equina, s/p surgery. By the time he had the surgery his PVR were as high as 600ml.   Now PVR are in the 100-150ml.   - Continue bladder scans - PVR  - Will need urology outpatient      Hyperglycemia- (present on admission)  Assessment & Plan  Reactive in the setting of infection vs. Prediabetes.   - Monitoring FBG         VTE prophylaxis: SCDs/TEDs    I have performed a physical exam and reviewed and updated ROS and Plan today (5/14/2024). In review of yesterday's note (5/13/2024), there are no changes except as documented above.

## 2024-05-14 NOTE — ASSESSMENT & PLAN NOTE
SIRS on admission:  Tachycardia, with heart rate greater than 90 BPM and Leukocytosis, with WBC greater than 12,000  SIRS due to acute UTI.  He received full 30 mg/kg IV fluid resuscitation bolus in the ED.  - See plan for UTI

## 2024-05-14 NOTE — CARE PLAN
The patient is Watcher - Medium risk of patient condition declining or worsening    Shift Goals  Clinical Goals: Monitor for complications of infection  Patient Goals: Rest, Sleep    Progress made toward(s) clinical / shift goals:    Problem: Urinary - Renal Perfusion  Goal: Ability to achieve and maintain adequate renal perfusion and functioning will improve  Description: Target End Date:  Prior to discharge or change in level of care    Document on I/O and Assessment flowsheet    1.  Urine output will remain greater than 0.5ml/Kg/HR  2.  Monitor amount and/or characteristics of urine per order/policy. Specific gravity per order/policy  3.  Assess signs and symptoms of renal dysfunction  Outcome: Progressing  Note: Pt has adequate urinary output, pt is assessed for post void residual. Pt's pos void residual is 120 mL     Problem: Knowledge Deficit - Standard  Goal: Patient and family/care givers will demonstrate understanding of plan of care, disease process/condition, diagnostic tests and medications  Description: Target End Date:  1-3 days or as soon as patient condition allows    Document in Patient Education    1.  Patient and family/caregiver oriented to unit, equipment, visitation policy and means for communicating concern  2.  Complete/review Learning Assessment  3.  Assess knowledge level of disease process/condition, treatment plan, diagnostic tests and medications  4.  Explain disease process/condition, treatment plan, diagnostic tests and medications  Outcome: Progressing  Note: Pt is updated of plan of care, pt's questions and concerns are addressed. Pt's barriers to discharge are addressed.

## 2024-05-14 NOTE — CARE PLAN
The patient is Stable - Low risk of patient condition declining or worsening    Shift Goals  Clinical Goals: SIRS monitoring, pain control,  Patient Goals: Sleep  Family Goals: EDGARD    Progress made toward(s) clinical / shift goals:        Problem: Pain - Standard  Goal: Alleviation of pain or a reduction in pain to the patient’s comfort goal  Flowsheets (Taken 5/14/2024 1430)  Non Verbal Scale:   Calm   Unlabored Breathing  OB Pain Intervention:   Repositioned   Rest  Pain Rating Scale (NPRS): 0  Note: 1. Document pain using the appropriate pain scale per order or unit policy 2. Educate and implement non-pharmacologic comfort measures (i.e. relaxation, distraction, massage, cold/heat therapy, etc.) 3. Pain management medications as ordered 4. Reassess pain after pain med administration per policy 5. If opiods administered assess patient's response to pain medication is appropriate per POSS sedation scale 6. Follow pain management plan developed in collaboration with patient and interdisciplinary team (including palliative care or pain specialists if applicable)      Problem: Urinary - Renal Perfusion  Goal: Ability to achieve and maintain adequate renal perfusion and functioning will improve  Note: 1. Urine output will remain greater than 0.5ml/Kg/HR 2. Monitor amount and/or characteristics of urine per order/policy. Specific gravity per order/policy 3. Assess signs and symptoms of renal dysfunction        Patient is not progressing towards the following goals:  na

## 2024-05-14 NOTE — ASSESSMENT & PLAN NOTE
Reports recurrent UTIs in the context of prior urinary incontinency.   Recent cauda equina syndrome status post L2/3 laminectomy with L2-L4 fusion in March 2024 and diagnosed with UTI late April. Has had some urinary retention since then likely facilitating urinary tract infections.   Last E. Coli had R to ampicillin and TMP/SMX.  Finished recent treatment on 5/8 with Cefdinir. Possibly failed given bioavailability of this abx or there is a complication from prior UTI.   - Obtain CT abd/pelv with contrast to rule out kidney stones and perinephric abscess  - Continue Cefazolin for now, discussed with ID pharmacy   - Follow blood and urine cultures

## 2024-05-14 NOTE — PROGRESS NOTES
4 Eyes Skin Assessment Completed by SIENA Vitale and SIENA Arevalo.    Head WDL  Ears WDL  Nose WDL  Mouth WDL  Neck WDL  Breast/Chest WDL  Shoulder Blades WDL  Spine WDL  (R) Arm/Elbow/Hand WDL  (L) Arm/Elbow/Hand WDL  Abdomen WDL  Groin WDL  Scrotum/Coccyx/Buttocks WDL  (R) Leg WDL  (L) Leg WDL  (R) Heel/Foot/Toe WDL  (L) Heel/Foot/Toe WDL          Devices In Places Tele Box      Interventions In Place N/A    Possible Skin Injury No    Pictures Uploaded Into Epic N/A  Wound Consult Placed N/A  RN Wound Prevention Protocol Ordered No

## 2024-05-14 NOTE — ED NOTES
Pt resting on gurney, pt attached to monitor, respirations are equal and unlabored, call light in reach , bed locked and in lowest position, no needs at this time

## 2024-05-14 NOTE — PROGRESS NOTES
Telemetry Report  Rhythm SR/ST  Heart Rate 96  Ectopy    ID 0.147  QRS 0.088  QT 0.3            Per telemetry room monitor

## 2024-05-14 NOTE — PROGRESS NOTES
Medical Student PROGRESS NOTE    Attending: Jackie Berman M.D.  Medical Student: Abisai Gloria, MS3  PATIENT: Luis Carlos Singh; [id number]; 1975    CC: Sepsis secondary to UTI and pyelonephritis     Consults:  None    OVERVIEW  HPI & Hospital Course:  Pt arrived to the ED with acute onset of symptoms including fever, chills, body aches, weakness, and dizziness. He denies dysuria and polyuria. He reports a recent visit to the ED on April 28 where he was diagnosed with a UTI with E.coli on urine culture. During that visit, he got 1 dose of cefazolin 2g IV and was sent home with cefdinir 300mg bid for 10 days, which he finished on 5/8. He reports having these urinary problems chronically and has had several past episodes of recurrent UTIs due to spinal chord issues, which has more recently started again since a recent lumbar spine surgery in March 2024 with Dr. Lauren for cauda equina syndrome and saddle anesthesia.     In the ED, the sepsis protocol was initiated due to tachycardia, fever, and leukocytosis (WBC = 20.9), and the pt received an LR bolus 30 mL/kg (2190mL total) and 500mL of NS, and he also received a dose of ceftriaxone 1000mg IM and acetaminophen 650mg, which relieved his fever. His bladder was scanned and showed a post-void residual of 144mL. An extensive lab and imaging workup was conducted, which was significant for a UA showing many bacteria with nitrates and proteins. Urine culture and blood cultures were collected, and he was admitted to the [facility] floor.     Interval updates:  -Pt admitted overnight and reports improving   -No acute events overnight     SUBJECTIVE  Pt reports feeling much better this morning. He denies HA, dizziness, CP, SOB, N/V/D/C, and abdominal pain. He thinks this recurrent UTI could be from his long term bladder issues caused by lumbar spine problems and surgeries.     ROS: All other systems were reviewed and otherwise negative aside from mentioned  "above    OBJECTIVE  Vital Signs: Last Filed Vitals Signs: 24 Hour Range  /79   Pulse 90   Temp 37 °C (98.6 °F) (Temporal)   Resp 18   Ht 1.778 m (5' 10\")   Wt 121 kg (265 lb 14 oz)   SpO2 94%     Physical Examination:  General: A&Ox4, NAD, non-toxic  HEENT: EOMI, no conjunctival injection  Neck: No Cervical LAD, No JVD, trachea is midline  Cardiovascular: RRR, no murmurs, no chest wall tenderness  Respiratory: CTAB, no wheezes, no accessory muscle use  Gastrointestinal: Soft, NT, ND, BS+, no hepatomegaly  Extremities:  no ALMA, no joint deformities in RLE or LLE  Skin: No rashes in visible areas, warm and dry  Psychiatric: Mood/affect non-depressed, no delirium  Neurologic: Cranial nerves are intact, sensation is symmetrical bilaterally    Laboratory Studies:  Recent Labs     05/13/24  1203 05/14/24  0238   WBC 20.9* 17.7*   RBC 5.31 5.29   HEMOGLOBIN 15.1 14.9   HEMATOCRIT 44.6 47.0   MCV 84.0 88.8   MCH 28.4 28.2   RDW 40.5 43.5   PLATELETCT 233 205   MPV 10.7 11.1   NEUTSPOLYS 80.90*  --    LYMPHOCYTES 10.10*  --    MONOCYTES 8.10  --    EOSINOPHILS 0.10  --    BASOPHILS 0.30  --      Recent Labs     05/13/24  1203 05/14/24  0238   SODIUM 137 138   POTASSIUM 3.9 4.3   CHLORIDE 102 104   CO2 21 21   GLUCOSE 117* 115*   BUN 12 9   CREATININE 0.89 0.75     DX-CHEST-PORTABLE (1 VIEW)   Final Result      Mild cardiomegaly.      CT-ABDOMEN-PELVIS WITH & W/O    (Results Pending)     Medications:  Medications were reviewed and updated     Assessment and Plan:  Luis Carlos Singh is a 49 y.o. male w/ PMHx of lumbar spinal stenosis s/p multiple lumbar surgeries was admitted 5/13/2024 for urosepsis with possible pyelonephritis.     Patient was discussed with bedside RN/SW      ### Sepsis   ### Persistent UTI with pyelonephritis   ### Possible urinary retention s/p lumbar spine surgery   Pt met sepsis criteria on admission based on leukocytosis, tachycardia, and fever with urine being the source of infection. " His most recent visit to the hospital on 4/28 for this issue had a urine culture positive for E. coli with no growth on blood cultures and a WBC = 14.9. He started having symptoms again within less than a week after completing his abx (cefdinir 300mg bid for 10 days) from that hospitalization, and his WBC = 20.9 in the ED, higher than his previous visit. His urine and blood cultures are pending. He is receiving abx empirically, and his WBC is trending down with the morning value at 17.7. Due to the recurrent nature of his infection, further exploration is warranted to rule out nephrolithiasis, abscess, or structural defect associated with persistent infection, including imaging.     -Continue Cefazolin 2g IVPB 100mL IVPB q8h   -Contacted [facility] for med recs   -Trend leukocytosis on CBC qday  -Monitor vitals for signs of sepsis and infection (tachycardia, labile BP, fever)  -CT-AP with contrast pending  -Acetaminophen 650mg q6h prn for temperature and fever   -Ondansetron 4mg q4h IV prn for nausea   -Prochlorperazine 5-10mg q4h IV prn for nausea  -Prochlorperazine 12.5-25mg q4h tablet or suppository prn for nausea unchanged by ondansetron when not tolerating po intake       ### Chronic condition management  This patient has an extensive history of chronic lumbar spine issues s/p multiple lumbar surgeries. He denies taking any medications at home for any medical conditions.          Diet: Full diet, no restrictions at this time     Fluids: None    PPx: OOB, pulmonary hygiene     Bowel: Polyethylene glycol, senna-docusate      PTOT: None    Lines/[place]: PIV    Code: Full    Dispo:Pt            Abisai Gloria, MS3  [employer]

## 2024-05-14 NOTE — ED NOTES
Bedside report received from off going RN/tech: Kiarra, assumed care of patient.  POC discussed with patient. Call light within reach, all needs addressed at this time.       Fall risk interventions in place: Move the patient closer to the nurse's station, Patient's personal possessions are with in their safe reach, Place socks on patient, Place fall risk sign on patient's door, Give patient urinal if applicable, Keep floor surfaces clean and dry, Accompanied to restroom, and Bed Alarm in use (all applicable per [place] Fall risk assessment)   Continuous monitoring: Cardiac Leads, Pulse Ox, or Blood Pressure  IVF/IV medications: NS bolus  Oxygen: Room Air  Bedside sitter: Not Applicable   Isolation: Not Applicable

## 2024-05-15 ENCOUNTER — PHARMACY VISIT (OUTPATIENT)
Dept: PHARMACY | Facility: MEDICAL CENTER | Age: 49
End: 2024-05-15
Payer: MEDICARE

## 2024-05-15 VITALS
RESPIRATION RATE: 18 BRPM | OXYGEN SATURATION: 96 % | HEIGHT: 70 IN | DIASTOLIC BLOOD PRESSURE: 75 MMHG | WEIGHT: 256.39 LBS | HEART RATE: 98 BPM | SYSTOLIC BLOOD PRESSURE: 122 MMHG | BODY MASS INDEX: 36.71 KG/M2 | TEMPERATURE: 98.1 F

## 2024-05-15 LAB
ANION GAP SERPL CALC-SCNC: 12 MMOL/L (ref 7–16)
BACTERIA UR CULT: ABNORMAL
BACTERIA UR CULT: ABNORMAL
BASOPHILS # BLD AUTO: 0.6 % (ref 0–1.8)
BASOPHILS # BLD: 0.06 K/UL (ref 0–0.12)
BUN SERPL-MCNC: 10 MG/DL (ref 8–22)
CALCIUM SERPL-MCNC: 9.7 MG/DL (ref 8.5–10.5)
CHLORIDE SERPL-SCNC: 102 MMOL/L (ref 96–112)
CO2 SERPL-SCNC: 25 MMOL/L (ref 20–33)
CREAT SERPL-MCNC: 0.95 MG/DL (ref 0.5–1.4)
EOSINOPHIL # BLD AUTO: 0.16 K/UL (ref 0–0.51)
EOSINOPHIL NFR BLD: 1.6 % (ref 0–6.9)
ERYTHROCYTE [DISTWIDTH] IN BLOOD BY AUTOMATED COUNT: 41.1 FL (ref 35.9–50)
GFR SERPLBLD CREATININE-BSD FMLA CKD-EPI: 97 ML/MIN/1.73 M 2
GLUCOSE SERPL-MCNC: 115 MG/DL (ref 65–99)
HCT VFR BLD AUTO: 47 % (ref 42–52)
HGB BLD-MCNC: 15.4 G/DL (ref 14–18)
IMM GRANULOCYTES # BLD AUTO: 0.03 K/UL (ref 0–0.11)
IMM GRANULOCYTES NFR BLD AUTO: 0.3 % (ref 0–0.9)
LYMPHOCYTES # BLD AUTO: 2.61 K/UL (ref 1–4.8)
LYMPHOCYTES NFR BLD: 26.4 % (ref 22–41)
MCH RBC QN AUTO: 28.2 PG (ref 27–33)
MCHC RBC AUTO-ENTMCNC: 32.8 G/DL (ref 32.3–36.5)
MCV RBC AUTO: 85.9 FL (ref 81.4–97.8)
MONOCYTES # BLD AUTO: 1.09 K/UL (ref 0–0.85)
MONOCYTES NFR BLD AUTO: 11 % (ref 0–13.4)
NEUTROPHILS # BLD AUTO: 5.94 K/UL (ref 1.82–7.42)
NEUTROPHILS NFR BLD: 60.1 % (ref 44–72)
NRBC # BLD AUTO: 0 K/UL
NRBC BLD-RTO: 0 /100 WBC (ref 0–0.2)
PLATELET # BLD AUTO: 229 K/UL (ref 164–446)
PMV BLD AUTO: 10.8 FL (ref 9–12.9)
POTASSIUM SERPL-SCNC: 4.3 MMOL/L (ref 3.6–5.5)
RBC # BLD AUTO: 5.47 M/UL (ref 4.7–6.1)
SIGNIFICANT IND 70042: ABNORMAL
SITE SITE: ABNORMAL
SODIUM SERPL-SCNC: 139 MMOL/L (ref 135–145)
SOURCE SOURCE: ABNORMAL
WBC # BLD AUTO: 9.9 K/UL (ref 4.8–10.8)

## 2024-05-15 PROCEDURE — 99239 HOSP IP/OBS DSCHRG MGMT >30: CPT | Mod: GC | Performed by: INTERNAL MEDICINE

## 2024-05-15 PROCEDURE — RXMED WILLOW AMBULATORY MEDICATION CHARGE

## 2024-05-15 RX ORDER — AMOXICILLIN AND CLAVULANATE POTASSIUM 875; 125 MG/1; MG/1
1 TABLET, FILM COATED ORAL EVERY 12 HOURS
Qty: 14 TABLET | Refills: 0 | Status: DISCONTINUED | OUTPATIENT
Start: 2024-05-15 | End: 2024-05-15 | Stop reason: HOSPADM

## 2024-05-15 RX ORDER — AMOXICILLIN AND CLAVULANATE POTASSIUM 875; 125 MG/1; MG/1
1 TABLET, FILM COATED ORAL 2 TIMES DAILY
Qty: 14 TABLET | Refills: 0 | Status: ACTIVE | OUTPATIENT
Start: 2024-05-15 | End: 2024-05-22

## 2024-05-15 RX ADMIN — AMOXICILLIN AND CLAVULANATE POTASSIUM 1 TABLET: 875; 125 TABLET, FILM COATED ORAL at 13:05

## 2024-05-15 RX ADMIN — CEFAZOLIN 2 G: 2 INJECTION, POWDER, FOR SOLUTION INTRAMUSCULAR; INTRAVENOUS at 05:23

## 2024-05-15 ASSESSMENT — FIBROSIS 4 INDEX: FIB4 SCORE: 0.74

## 2024-05-15 NOTE — PROGRESS NOTES
Telemetry Report  Rhythm SR  Heart Rate 96  Ectopy    DC 0.15  QRS 0.12  QT 0.32            Per telemetry room monitor

## 2024-05-15 NOTE — DISCHARGE SUMMARY
[facility] Internal Medicine Discharge Summary    Attending: Jackie Berman M.d.  Senior Resident: Dr. Navarro  Intern:  Dr. Avila  Contact Number: [phone]    CHIEF COMPLAINT ON ADMISSION  Chief Complaint   Patient presents with    Fever    Joint Pain     Pt reports started this morning.        Reason for Admission  UTI    Admission Date  5/13/2024    CODE STATUS  Full Code    HPI & HOSPITAL COURSE  Luis Carlos Singh is a 49 y.o. male, with prior history of cauda equina syndrome status post L2/3 laminectomy with L2-L4 fusion in March 2024, and history of recurrent UTIs and episodic urinary incontinence, who presented to the emergency department on 5/13/2024 with fever/chills, generalized bodyaches, dysuria and urgency for about 2 days. He had finished Cefdinir for UTI on 5/8.     He was found in SIRS: tachycardic, WBC 20K. UA with packed WBC, positive nitrites, LE and bacteria. Received 1 dose of ceftriaxone and 2 days of cefazolin. CT abdomen/pelvis with contras showed no stones or abscesses. Urine culture positive for E. Coli R to unasyn and TMP/SMX. Patient's symptoms improved and SIRS resolved. Patient has history of penicillin allergy with a mild rash 30 years ago, a penicillin challenge was proposed and patient verbalized understanding risks and benefits, Augmentin was administered without reactions or issues, patient remained stable and asymptomatic.     Therefore, he is discharged in fair and stable condition to home with close outpatient follow-up. Transitioned to oral antibiotics, Amoxicillin-Clavulanate until 5/22.    The patient met 2-midnight criteria for an inpatient stay at the time of discharge.    Discharge Date  5/15/2024    Physical Exam on Day of Discharge  Physical Exam  Vitals and nursing note reviewed.   Constitutional:       General: He is not in acute distress.     Appearance: Normal appearance. He is not ill-appearing or toxic-appearing.   HENT:      Head: Normocephalic and atraumatic.       Mouth/Throat:      Mouth: Mucous membranes are moist.   Eyes:      Extraocular Movements: Extraocular movements intact.      Conjunctiva/sclera: Conjunctivae normal.   Cardiovascular:      Rate and Rhythm: Normal rate and regular rhythm.      Pulses: Normal pulses.      Heart sounds: Normal heart sounds.   Pulmonary:      Effort: Pulmonary effort is normal.      Breath sounds: Normal breath sounds.   Abdominal:      General: Abdomen is flat. Bowel sounds are normal.      Palpations: Abdomen is soft.      Comments: No suprapubic pain.    Musculoskeletal:         General: No swelling. Normal range of motion.      Cervical back: Normal range of motion.   Skin:     General: Skin is warm.      Capillary Refill: Capillary refill takes less than 2 seconds.   Neurological:      General: No focal deficit present.      Mental Status: He is alert. Mental status is at baseline.      Comments: Know oculomotor palsy present   Psychiatric:         Mood and Affect: Mood normal.         FOLLOW UP ITEMS POST DISCHARGE  5/15/2024    DISCHARGE DIAGNOSES  Principal Problem:    Acute UTI (POA: Unknown)  Active Problems:    SIRS (systemic inflammatory response syndrome) (HCC) (POA: Yes)    Hyperglycemia (POA: Yes)    Urinary retention (POA: Unknown)  Resolved Problems:    * No resolved hospital problems. *      FOLLOW UP  Future Appointments   Date Time Provider Department Center   6/10/2024  3:00 PM Astrida Entiat, PT, DPT [address] [place] [address]   6/13/2024  3:00 PM Astrida Entiat, PT, DPT [facility] [place] [address]   6/17/2024  3:00 PM Astrida Cherelle, PT, DPT [facility] [place] [address]   6/20/2024  3:00 PM Astrida Cherelle, PT, DPT [facility] [place] [address]   6/24/2024  3:00 PM Astrida Cherelle, PT, DPT [facility] [place] [address]   6/27/2024  3:00 PM Astrida Cherelle, PT, DPT [address] [place] [address]   7/1/2024  3:00 PM Astrida Entiat, PT, DPT [facility] [place] [address]   7/8/2024  3:00 PM Astrida Cherelle, PT, DPT [facility] [place] [address]   7/11/2024  3:00 PM Astrida Entiat, PT, DPT  "[facility] [place] [address]     Jackie Espinosa P.A.-C.  [address] [id number] [phone]    Follow up in 1 week(s)        MEDICATIONS ON DISCHARGE     Medication List        START taking these medications        Instructions   amoxicillin-clavulanate 875-125 MG Tabs  Commonly known as: Augmentin   Take 1 Tablet by mouth 2 times a day for 7 days.  Dose: 1 Tablet            CONTINUE taking these medications        Instructions   albuterol 108 (90 Base) MCG/ACT Aers inhalation aerosol   Inhale 2 Puffs every four hours as needed for Shortness of Breath.  Dose: 2 Puff     ibuprofen 200 MG Tabs  Commonly known as: Motrin   Take 200-400 mg by mouth every 6 hours as needed for Fever.  Dose: 200-400 mg     NEEDLE (DISP) 18 G 18G X 1-1/2\" Misc   Use one needle weekly for testosterone     testosterone cypionate 200 MG/ML injection  Commonly known as: Depo-Testosterone   140 mg every Wednesday. 0.7 ml = 140 mg  Dose: 140 mg            STOP taking these medications      cefdinir 300 MG Caps  Commonly known as: Omnicef              Allergies  Allergies   Allergen Reactions    Penicillins Rash     Unknown reaction, mother told him > 40 years ago       DIET  Orders Placed This Encounter   Procedures    Diet Order Diet: Regular     Standing Status:   Standing     Number of Occurrences:   1     Order Specific Question:   Diet:     Answer:   Regular [1]       ACTIVITY  As tolerated.  Weight bearing as tolerated    CONSULTATIONS  None    PROCEDURES  None    LABORATORY  Lab Results   Component Value Date    SODIUM 139 05/15/2024    POTASSIUM 4.3 05/15/2024    CHLORIDE 102 05/15/2024    CO2 25 05/15/2024    GLUCOSE 115 (H) 05/15/2024    BUN 10 05/15/2024    CREATININE 0.95 05/15/2024        Lab Results   Component Value Date    WBC 9.9 05/15/2024    HEMOGLOBIN 15.4 05/15/2024    HEMATOCRIT 47.0 05/15/2024    PLATELETCT 229 05/15/2024        Total time of the discharge process exceeds 49 minutes.  "

## 2024-05-15 NOTE — CARE PLAN
The patient is Stable - Low risk of patient condition declining or worsening    Shift Goals  Clinical Goals: Abx, limit interuptions  Patient Goals: rest, sleep  Family Goals: EDG[place]    Progress made toward(s) clinical / shift goals:    Problem: Physical Regulation  Goal: Diagnostic test results will improve  Description: Target End Date:  Prior to discharge or change in level of care    1.  Monitor lactic acid levels  2.  Monitor ABG's  3.  Monitor diagnostic test results  Outcome: Progressing  Note: Pt's lab levels are monitored, pt's VS are monitored, pt's s/s of infection are monitored and addressed.     Problem: Knowledge Deficit - Standard  Goal: Patient and family/care givers will demonstrate understanding of plan of care, disease process/condition, diagnostic tests and medications  Description: Target End Date:  1-3 days or as soon as patient condition allows    Document in Patient Education    1.  Patient and family/caregiver oriented to unit, equipment, visitation policy and means for communicating concern  2.  Complete/review Learning Assessment  3.  Assess knowledge level of disease process/condition, treatment plan, diagnostic tests and medications  4.  Explain disease process/condition, treatment plan, diagnostic tests and medications  Outcome: Progressing  Note: Pt is updated of plan of care at bedside shift report, pts questions and concerns are addressed. Pt's barriers to discharge are addressed.

## 2024-05-15 NOTE — DISCHARGE INSTRUCTIONS
Please finish taking the antibiotics as prescribed.   As you know, if you are retaining urine and notice unable to void, if you have any fevers or pain with urination again, you'll have to come back.       FOLLOW UP ITEMS POST DISCHARGE  5/15/2024     DISCHARGE DIAGNOSES  Principal Problem:    Acute UTI (POA: Unknown)  Active Problems:    SIRS (systemic inflammatory response syndrome) (HCC) (POA: Yes)    Hyperglycemia (POA: Yes)    Urinary retention (POA: Unknown)  Resolved Problems:    * No resolved hospital problems. *        FOLLOW UP         Future Appointments   Date Time Provider Department Center   6/10/2024  3:00 PM Astrida Hysham, PT, DPT [facility] [place] [address]   6/13/2024  3:00 PM Astrida Hysham, PT, DPT [facility] [place] [address]   6/17/2024  3:00 PM Astrida Cherelle, PT, DPT [facility] [place] [address]   6/20/2024  3:00 PM Astrida Hysham, PT, DPT [facility] [place] [address]   6/24/2024  3:00 PM Astrida Hysham, PT, DPT [address] [place] [address]   6/27/2024  3:00 PM Astrida Cherelle, PT, DPT [address] [place] [address]   7/1/2024  3:00 PM Astrida Cherelle, PT, DPT [address] [place] [address]   7/8/2024  3:00 PM Astrida Cherelle, PT, DPT [address] [place] [address]   7/11/2024  3:00 PM Astrida Cherelle, PT, DPT [address] [place] [address]      BRIDGET LindaA.-IVONE.  [address] [id number]  [phone]     Follow up in 1 week(s)

## 2024-05-15 NOTE — PROGRESS NOTES
Pt is alert and oriented x4. Pt has no complaints of pain, pt mentions feeling feverish but does not have a fever. Pt is given tylenol and an ice pack for a minor headache. Pt is independent and continent. Pt is refusing tele monitor, said it was discussed with day shift provider that it could be d/c'd. Pt's VSS, call light in reach.

## 2024-05-15 NOTE — PROGRESS NOTES
Medical Student PROGRESS NOTE    Attending: Jackie Berman M.D.  Medical Student: Abisai Gloria, MS3  PATIENT: Luis Carlos Singh; [id number]; 1975      CC: Sepsis secondary to UTI and pyelonephritis     Consults:  None    OVERVIEW  HPI & Hospital Course:  Pt arrived to the ED with acute onset of symptoms including fever, chills, body aches, weakness, and dizziness. He denies dysuria and polyuria. He reports a recent visit to the ED on April 28 where he was diagnosed with a UTI with E.coli on urine culture. During that visit, he got 1 dose of cefazolin 2g IV and was sent home with cefdinir 300mg bid for 10 days, which he finished on 5/8. He reports having these urinary problems chronically and has had several past episodes of recurrent UTIs due to spinal chord issues, which has more recently started again since a recent lumbar spine surgery in March 2024 with Dr. Lauren for cauda equina syndrome and saddle anesthesia.      In the ED, the sepsis protocol was initiated due to tachycardia, fever, and leukocytosis (WBC = 20.9), and the pt received an LR bolus 30 mL/kg (2190mL total) and 500mL of NS, and he also received a dose of ceftriaxone 1000mg IM and acetaminophen 650mg, which relieved his fever. His bladder was scanned and showed a post-void residual of 144mL. An extensive lab and imaging workup was conducted, which was significant for a UA showing many bacteria with nitrates and proteins. Urine culture and blood cultures were collected, and he was admitted to the [facility] floor.      Interval updates:  -No acute events overnight       SUBJECTIVE  Pt reports feeling great this morning and states he is not having any more symptoms. He denies weakness, dizziness, HA, SOB, CP, N/V/D/C, abdominal pain, and flank pain. He states he is ready to go whenever he is cleared for discharge.     ROS: All other systems were reviewed and otherwise negative aside from mentioned above    OBJECTIVE  Vital Signs: Last Filed Vitals  "Signs: 24 Hour Range  /75   Pulse 98   Temp 36.7 °C (98.1 °F) (Temporal)   Resp 18   Ht 1.778 m (5' 10\")   Wt 116 kg (256 lb 6.3 oz)   SpO2 96%     Physical Examination:  General: A&Ox4, NAD, non-toxic  HEENT: EOMI, no conjunctival injection  Neck: No Cervical LAD, No JVD, trachea is midline  Cardiovascular: RRR, no murmurs, no chest wall tenderness  Respiratory: CTAB, no wheezes, no accessory muscle use  Gastrointestinal: Soft, NT, ND, BS+, no hepatomegaly  Extremities:  no ALMA, no joint deformities in RLE or LLE  Skin: No rashes in visible areas, warm and dry  Psychiatric: Mood/affect non-depressed, no delirium  Neurologic: Cranial nerves are intact, sensation is symmetrical bilaterally    Laboratory Studies:  Recent Labs     05/13/24  1203 05/14/24  0238 05/15/24  0101   WBC 20.9* 17.7* 9.9   RBC 5.31 5.29 5.47   HEMOGLOBIN 15.1 14.9 15.4   HEMATOCRIT 44.6 47.0 47.0   MCV 84.0 88.8 85.9   MCH 28.4 28.2 28.2   RDW 40.5 43.5 41.1   PLATELETCT 233 205 229   MPV 10.7 11.1 10.8   NEUTSPOLYS 80.90*  --  60.10   LYMPHOCYTES 10.10*  --  26.40   MONOCYTES 8.10  --  11.00   EOSINOPHILS 0.10  --  1.60   BASOPHILS 0.30  --  0.60     Recent Labs     05/13/24  1203 05/14/24  0238 05/15/24  0101   SODIUM 137 138 139   POTASSIUM 3.9 4.3 4.3   CHLORIDE 102 104 102   CO2 21 21 25   GLUCOSE 117* 115* 115*   BUN 12 9 10   CREATININE 0.89 0.75 0.95     CT-ABDOMEN-PELVIS WITH   Final Result      1.  No acute inflammatory abnormality in the abdomen and pelvis.   2.  Hepatic steatosis.   3.  Small fat-containing umbilical hernia.      DX-CHEST-PORTABLE (1 VIEW)   Final Result      Mild cardiomegaly.        Medications:  Medications were reviewed and updated     Assessment and Plan:  Luis Carlos Singh is a 49 y.o. male w/ PMHx of lumbar spinal stenosis s/p multiple lumbar surgeries was admitted 5/13/2024 for urosepsis with possible pyelonephritis.      Patient was discussed with bedside RN/SW        ### Sepsis   ### " Persistent UTI with pyelonephritis   ### Possible urinary retention s/p lumbar spine surgery   Pt met sepsis criteria on admission based on leukocytosis, tachycardia, and fever with urine being the source of infection. His most recent visit to the hospital on 4/28 for this issue had a urine culture positive for E. coli with no growth on blood cultures and a WBC = 14.9. He started having symptoms again within less than a week after completing his abx (cefdinir 300mg bid for 10 days) from that hospitalization, and his WBC = 20.9 in the ED, higher than his previous visit. His urine and blood cultures are pending. He is receiving abx empirically, and his WBC is trended down to normal with the morning value at 9.9. CT-AP from yesterday did not show any signs of abscess or infectious source. The pt reported a possible penicillin allergy from childhood, but he does not know the response.      -Continue Cefazolin 2g IVPB 100mL IVPB q8h while inpatient   -Penicillin challenge to determine whether the pt has an allergy  -Outpatient meds will depend on penicillin challenge, if he does not have an allergy, then we can send him home on Augmentin   -Monitor vitals for signs of sepsis and infection (tachycardia, labile BP, fever)  -Acetaminophen 650mg q6h prn for temperature and fever   -Ondansetron 4mg q4h IV prn for nausea   -Prochlorperazine 5-10mg q4h IV prn for nausea  -Prochlorperazine 12.5-25mg q4h tablet or suppository prn for nausea unchanged by ondansetron when not tolerating po intake         ### Chronic condition management  This patient has an extensive history of chronic lumbar spine issues s/p multiple lumbar surgeries. He denies taking any medications at home for any medical conditions.              Diet: Full diet, no restrictions at this time      Fluids: None     PPx: OOB, pulmonary hygiene      Bowel: Polyethylene glycol, senna-docusate       PTOT: None     Lines/[place]: PIV     Code: Full     Dispo:Pt is  cleared for discharge today pending choice of outpatient abx.                  Abisai Gloria, MS3  [employer] [employer]

## 2024-05-16 ENCOUNTER — APPOINTMENT (OUTPATIENT)
Dept: OCCUPATIONAL THERAPY | Facility: REHABILITATION | Age: 49
End: 2024-05-16
Attending: INTERNAL MEDICINE
Payer: COMMERCIAL

## 2024-05-20 ENCOUNTER — APPOINTMENT (OUTPATIENT)
Dept: OCCUPATIONAL THERAPY | Facility: REHABILITATION | Age: 49
End: 2024-05-20
Attending: INTERNAL MEDICINE
Payer: COMMERCIAL

## 2024-05-23 ENCOUNTER — APPOINTMENT (OUTPATIENT)
Dept: OCCUPATIONAL THERAPY | Facility: REHABILITATION | Age: 49
End: 2024-05-23
Attending: INTERNAL MEDICINE
Payer: COMMERCIAL

## 2024-06-10 ENCOUNTER — OFFICE VISIT (OUTPATIENT)
Dept: MEDICAL GROUP | Facility: CLINIC | Age: 49
End: 2024-06-10
Payer: COMMERCIAL

## 2024-06-10 ENCOUNTER — HOSPITAL ENCOUNTER (OUTPATIENT)
Facility: MEDICAL CENTER | Age: 49
End: 2024-06-10
Attending: PHYSICIAN ASSISTANT
Payer: COMMERCIAL

## 2024-06-10 ENCOUNTER — APPOINTMENT (OUTPATIENT)
Dept: PHYSICAL THERAPY | Facility: REHABILITATION | Age: 49
End: 2024-06-10
Attending: INTERNAL MEDICINE
Payer: COMMERCIAL

## 2024-06-10 VITALS
TEMPERATURE: 97.9 F | BODY MASS INDEX: 38.57 KG/M2 | DIASTOLIC BLOOD PRESSURE: 68 MMHG | SYSTOLIC BLOOD PRESSURE: 112 MMHG | HEART RATE: 87 BPM | WEIGHT: 269.4 LBS | OXYGEN SATURATION: 95 % | RESPIRATION RATE: 18 BRPM | HEIGHT: 70 IN

## 2024-06-10 DIAGNOSIS — N39.0 CHRONIC UTI: ICD-10-CM

## 2024-06-10 LAB
APPEARANCE UR: NORMAL
COLOR UR AUTO: NORMAL
GLUCOSE UR STRIP.AUTO-MCNC: NORMAL MG/DL
KETONES UR STRIP.AUTO-MCNC: NORMAL MG/DL
LEUKOCYTE ESTERASE UR QL STRIP.AUTO: NORMAL
NITRITE UR QL STRIP.AUTO: POSITIVE
PH UR STRIP.AUTO: 6 [PH] (ref 5–8)
PROT UR QL STRIP: NEGATIVE MG/DL
RBC UR QL AUTO: NORMAL
SP GR UR STRIP.AUTO: 1.02

## 2024-06-10 PROCEDURE — 99213 OFFICE O/P EST LOW 20 MIN: CPT | Performed by: PHYSICIAN ASSISTANT

## 2024-06-10 PROCEDURE — 87077 CULTURE AEROBIC IDENTIFY: CPT

## 2024-06-10 PROCEDURE — 82043 UR ALBUMIN QUANTITATIVE: CPT

## 2024-06-10 PROCEDURE — 87086 URINE CULTURE/COLONY COUNT: CPT

## 2024-06-10 PROCEDURE — 87186 SC STD MICRODIL/AGAR DIL: CPT

## 2024-06-10 PROCEDURE — 3078F DIAST BP <80 MM HG: CPT | Performed by: PHYSICIAN ASSISTANT

## 2024-06-10 PROCEDURE — 3074F SYST BP LT 130 MM HG: CPT | Performed by: PHYSICIAN ASSISTANT

## 2024-06-10 PROCEDURE — 82570 ASSAY OF URINE CREATININE: CPT

## 2024-06-10 RX ORDER — CIPROFLOXACIN 500 MG/1
500 TABLET, FILM COATED ORAL 2 TIMES DAILY
Qty: 20 TABLET | Refills: 1 | Status: SHIPPED | OUTPATIENT
Start: 2024-06-10 | End: 2024-06-20

## 2024-06-10 RX ORDER — TAMSULOSIN HYDROCHLORIDE 0.4 MG/1
0.4 CAPSULE ORAL
COMMUNITY
Start: 2024-05-21

## 2024-06-10 ASSESSMENT — FIBROSIS 4 INDEX: FIB4 SCORE: 0.74

## 2024-06-10 NOTE — PROGRESS NOTES
cc:  uti symptoms    Subjective:     Luis Carlos Singh is a 49 y.o. male presenting for uti symptoms        History of Present Illness  The patient is a 49-year-old male, prefers pronouncing him, here to follow up from previous ER visits. He was seen on 04/20/2024 and 05/13/2024 and 05/24/2024 and was diagnosed with UTIs. Both visits, UTIs were attributed to cauda equina syndrome in the 04/28/2024 visit.    The patient is experiencing severe frustration with his urinary condition, characterized by bilateral kidney pain, which initiated on the right side on Saturday or Sunday. As of today, he has mild discomfort in both kidneys. Despite not having a fever, his most recent recorded temperature was 103.7. He expresses concern about sepsis and reports a malodorous urine. He completed a 7-day course of amoxicillin twice daily on 04/13/2024, which alleviated his symptoms. He expresses dissatisfaction with the treatment prescribed by urology, who recommended Flomax. He has been hospitalized twice, both for UTIs. He seeks a second opinion from urology. He has been diagnosed with a hernia for approximately 2 years, which occasionally retracts when he coughs excessively. The ER doctor informed him that his prostate is growing normally, but it is not a cause for concern. He has been experiencing this complaint since his back surgery. Each back surgery typically results in UTIs. Initially, catheterization was used for catheterization, but this recent episode did not necessitate catheterization. He experienced temporary relief from catheterization, but currently, he is unable to evacuate his bladder. His urine has tested positive for E. coli. His blood has been cultured, and he is concerned about sepsis. His symptoms peaked at 2 during the first few visits, but increased to 4 during the second visit. Both times in the ER, he was administered cephalexin, which alleviated his kidney pain within 4 to 6 hours. He typically  "receives a maintenance 7 to 10-day prescription, which provides him with significant relief. The last prescription provided relief for 4 days from the time he discontinued his hospital visit. Amoxicillin provided relief for 7 days. A CHEM panel and kidney function were performed, which were normal. He currently denies kidney pain. He reported the ER doctors that his kidneys were palpable. He did not experienced a reaction after receiving a dose of PENICILLIN in the hospital. His symptoms returned 12 days post-discontinuation. Cefdinir was ineffective for him.    Supplemental Information  His COVID-19 rash has returned. He was given a medication for that, which got rid of all those for almost a year.   He is allergic to PENICILLIN.       Review of systems:  See above.   Denies any symptoms unless previously indicated.        Current Outpatient Medications:     tamsulosin (FLOMAX) 0.4 MG capsule, Take 0.4 mg by mouth every day., Disp: , Rfl:     ibuprofen (MOTRIN) 200 MG Tab, Take 200-400 mg by mouth every 6 hours as needed for Fever., Disp: , Rfl:     testosterone cypionate (DEPO-TESTOSTERONE) 200 MG/ML injection, 140 mg every Wednesday. 0.7 ml = 140 mg, Disp: , Rfl:     NEEDLE, DISP, 18 G 18G X 1-1/2\" Misc, Use one needle weekly for testosterone, Disp: 12 Each, Rfl: 3    albuterol 108 (90 Base) MCG/ACT Aero Soln inhalation aerosol, Inhale 2 Puffs every four hours as needed for Shortness of Breath., Disp: 18 g, Rfl: 2    Allergies, past medical history, past surgical history, family history, social history reviewed and updated    Objective:     Vitals: /68 (BP Location: Left arm, Patient Position: Sitting, BP Cuff Size: Large adult)   Pulse 87   Temp 36.6 °C (97.9 °F) (Temporal)   Resp 18   Ht 1.778 m (5' 10\")   Wt 122 kg (269 lb 6.4 oz)   SpO2 95%   BMI 38.66 kg/m²   General: Alert, pleasant, NAD  EYES:   PERRL, EOMI, no icterus or pallor.  Conjunctivae and lids normal.   HENT:  Normocephalic.  External " ears normal.   Neck supple.   Respiratory: Normal respiratory effort.   Abdomen: obese  Skin: Warm, dry, no rashes.  Musculoskeletal: Gait is normal.  Moves all extremities well.    Extremities: normal range of motion all extremities.   Neurological: No tremors, sensation grossly intact,  CN2-12 intact.  Psych:  Affect/mood is normal, judgement is good, memory is intact, grooming is appropriate.    Results  Laboratory Studies  Urine culture showed E. coli. Lactic acid was resistant. Kidney function was normal.    Imaging  CT scan showed a hernia.       Assessment/Plan:     There are no diagnoses linked to this encounter.    Assessment & Plan  1. Chronic Urinary Tract Infection (UTI).  Upon reviewing the patient's chart, previous antibiotics, and the culture results on file, there is a concern that the patient's symptoms may necessitate another ER trip. In light of the patient's susceptibility to Cipro, we will initiate treatment with Cipro for a duration of 10 days, with one refill. Additionally, the urology referral will be resubmitted due to the patient's concern about the severity of the patient's UTI.    Follow-up  The patient is scheduled for a follow-up visit in 1 week. He has been advised to contact the clinic sooner should any issues or concerns arise.    No follow-ups on file.    Please note that this dictation was created using voice recognition software. I have made every reasonable attempt to correct obvious errors, but expect that there are errors of grammar and possible content that I did not discover before finalizing note.

## 2024-06-11 LAB
CREAT UR-MCNC: 128.17 MG/DL
MICROALBUMIN UR-MCNC: 1.5 MG/DL
MICROALBUMIN/CREAT UR: 12 MG/G (ref 0–30)

## 2024-06-13 ENCOUNTER — APPOINTMENT (OUTPATIENT)
Dept: PHYSICAL THERAPY | Facility: REHABILITATION | Age: 49
End: 2024-06-13
Attending: INTERNAL MEDICINE
Payer: COMMERCIAL

## 2024-06-17 ENCOUNTER — APPOINTMENT (OUTPATIENT)
Dept: PHYSICAL THERAPY | Facility: REHABILITATION | Age: 49
End: 2024-06-17
Attending: INTERNAL MEDICINE
Payer: COMMERCIAL

## 2024-06-20 ENCOUNTER — APPOINTMENT (OUTPATIENT)
Dept: PHYSICAL THERAPY | Facility: REHABILITATION | Age: 49
End: 2024-06-20
Attending: INTERNAL MEDICINE
Payer: COMMERCIAL

## 2024-06-24 ENCOUNTER — APPOINTMENT (OUTPATIENT)
Dept: PHYSICAL THERAPY | Facility: REHABILITATION | Age: 49
End: 2024-06-24
Attending: INTERNAL MEDICINE
Payer: COMMERCIAL

## 2024-06-27 ENCOUNTER — APPOINTMENT (OUTPATIENT)
Dept: PHYSICAL THERAPY | Facility: REHABILITATION | Age: 49
End: 2024-06-27
Attending: INTERNAL MEDICINE
Payer: COMMERCIAL

## 2024-07-01 ENCOUNTER — APPOINTMENT (OUTPATIENT)
Dept: PHYSICAL THERAPY | Facility: REHABILITATION | Age: 49
End: 2024-07-01
Attending: INTERNAL MEDICINE
Payer: COMMERCIAL

## 2024-07-08 ENCOUNTER — APPOINTMENT (OUTPATIENT)
Dept: PHYSICAL THERAPY | Facility: REHABILITATION | Age: 49
End: 2024-07-08
Attending: INTERNAL MEDICINE
Payer: COMMERCIAL

## 2024-07-11 ENCOUNTER — APPOINTMENT (OUTPATIENT)
Dept: PHYSICAL THERAPY | Facility: REHABILITATION | Age: 49
End: 2024-07-11
Attending: INTERNAL MEDICINE
Payer: COMMERCIAL

## 2024-07-15 ENCOUNTER — HOSPITAL ENCOUNTER (OUTPATIENT)
Dept: LAB | Facility: MEDICAL CENTER | Age: 49
End: 2024-07-15
Attending: PHYSICIAN ASSISTANT
Payer: COMMERCIAL

## 2024-07-15 LAB
ESTRADIOL SERPL-MCNC: 39.2 PG/ML
HCT VFR BLD AUTO: 48.4 % (ref 42–52)
HGB BLD-MCNC: 16 G/DL (ref 14–18)
TESTOST SERPL-MCNC: 602 NG/DL (ref 175–781)

## 2024-07-15 PROCEDURE — 82670 ASSAY OF TOTAL ESTRADIOL: CPT

## 2024-07-15 PROCEDURE — 84403 ASSAY OF TOTAL TESTOSTERONE: CPT

## 2024-07-15 PROCEDURE — 85018 HEMOGLOBIN: CPT

## 2024-07-15 PROCEDURE — 36415 COLL VENOUS BLD VENIPUNCTURE: CPT

## 2024-07-15 PROCEDURE — 85014 HEMATOCRIT: CPT

## 2024-09-30 ENCOUNTER — HOSPITAL ENCOUNTER (OUTPATIENT)
Facility: MEDICAL CENTER | Age: 49
End: 2024-09-30
Attending: UROLOGY
Payer: COMMERCIAL

## 2024-09-30 LAB — AMBIGUOUS DTTM AMBI4: NORMAL

## 2024-09-30 PROCEDURE — 87186 SC STD MICRODIL/AGAR DIL: CPT

## 2024-09-30 PROCEDURE — 87086 URINE CULTURE/COLONY COUNT: CPT

## 2024-09-30 PROCEDURE — 87077 CULTURE AEROBIC IDENTIFY: CPT

## 2024-10-29 ENCOUNTER — APPOINTMENT (OUTPATIENT)
Dept: MEDICAL GROUP | Facility: CLINIC | Age: 49
End: 2024-10-29
Payer: COMMERCIAL

## 2024-11-06 ENCOUNTER — OFFICE VISIT (OUTPATIENT)
Dept: MEDICAL GROUP | Facility: CLINIC | Age: 49
End: 2024-11-06
Payer: COMMERCIAL

## 2024-11-06 ENCOUNTER — PATIENT MESSAGE (OUTPATIENT)
Dept: MEDICAL GROUP | Facility: CLINIC | Age: 49
End: 2024-11-06

## 2024-11-06 VITALS
TEMPERATURE: 97.2 F | WEIGHT: 282.19 LBS | RESPIRATION RATE: 98 BRPM | HEIGHT: 70 IN | HEART RATE: 76 BPM | BODY MASS INDEX: 40.4 KG/M2 | DIASTOLIC BLOOD PRESSURE: 78 MMHG | SYSTOLIC BLOOD PRESSURE: 124 MMHG

## 2024-11-06 DIAGNOSIS — J44.89 ASTHMA-CHRONIC OBSTRUCTIVE PULMONARY DISEASE OVERLAP SYNDROME (HCC): ICD-10-CM

## 2024-11-06 DIAGNOSIS — N39.0 CHRONIC UTI: ICD-10-CM

## 2024-11-06 DIAGNOSIS — B36.0 TINEA VERSICOLOR: ICD-10-CM

## 2024-11-06 PROCEDURE — 3078F DIAST BP <80 MM HG: CPT | Performed by: PHYSICIAN ASSISTANT

## 2024-11-06 PROCEDURE — 99214 OFFICE O/P EST MOD 30 MIN: CPT | Performed by: PHYSICIAN ASSISTANT

## 2024-11-06 PROCEDURE — 3074F SYST BP LT 130 MM HG: CPT | Performed by: PHYSICIAN ASSISTANT

## 2024-11-06 RX ORDER — ALBUTEROL SULFATE 90 UG/1
2 INHALANT RESPIRATORY (INHALATION) EVERY 4 HOURS PRN
Qty: 8.5 G | Refills: 2 | Status: SHIPPED | OUTPATIENT
Start: 2024-11-06

## 2024-11-06 RX ORDER — FLUCONAZOLE 200 MG/1
200 TABLET ORAL DAILY
Qty: 14 TABLET | Refills: 0 | Status: SHIPPED | OUTPATIENT
Start: 2024-11-06 | End: 2024-11-20

## 2024-11-06 RX ORDER — NITROFURANTOIN MACROCRYSTALS 50 MG/1
CAPSULE ORAL
COMMUNITY
Start: 2024-10-07

## 2024-11-06 ASSESSMENT — FIBROSIS 4 INDEX: FIB4 SCORE: 0.74

## 2024-11-06 NOTE — PROGRESS NOTES
cc:  rash    Subjective:     Luis Carlos Singh is a 49 y.o. male presenting for rash        History of Present Illness  The patient is a 49-year-old male who presents to the office today for a rash.    He has been experiencing a widespread rash on his back, sides, and crotch area. Initially, the rash was not itchy, but it has now developed into a constant itch. He recalls a similar rash that lasted for about a year and was treated with an oral medication. He also mentions that he was given a soap at [place], which did not alleviate the rash. He believes he was prescribed Diflucan 400 mg once, which seemed to help. He is considering taking Diflucan 800 mg twice daily.    He is currently on a 90-day course of nitrofurantoin for a urinary tract infection (UTI). He reports a strong bacterial smell that subsides after a few days. He has not experienced any burning sensation for approximately 2 weeks.    He mentions that his insurance does not cover testosterone. He has been using albuterol for about 2 to 3 years without any follow-up since the initial prescription. He has not undergone a pulmonary function test.       Review of systems:  See above.   Denies any symptoms unless previously indicated.        Current Outpatient Medications:     nitrofurantoin (MACRODANTIN) 50 MG Cap, Take 1 capsule every day by oral route as directed for 90 days, for UTI prevention., Disp: , Rfl:     fluconazole (DIFLUCAN) 200 MG Tab, Take 1 Tablet by mouth every day for 14 days., Disp: 14 Tablet, Rfl: 0    albuterol 108 (90 Base) MCG/ACT Aero Soln inhalation aerosol, Inhale 2 Puffs every four hours as needed for Shortness of Breath., Disp: 8.5 g, Rfl: 2    tamsulosin (FLOMAX) 0.4 MG capsule, Take 0.4 mg by mouth every day. (Patient not taking: Reported on 11/6/2024), Disp: , Rfl:     ibuprofen (MOTRIN) 200 MG Tab, Take 200-400 mg by mouth every 6 hours as needed for Fever. (Patient not taking: Reported on 11/6/2024), Disp: , Rfl:      "testosterone cypionate (DEPO-TESTOSTERONE) 200 MG/ML injection, 140 mg every Wednesday. 0.7 ml = 140 mg (Patient not taking: Reported on 11/6/2024), Disp: , Rfl:     NEEDLE, DISP, 18 G 18G X 1-1/2\" Misc, Use one needle weekly for testosterone (Patient not taking: Reported on 11/6/2024), Disp: 12 Each, Rfl: 3    Allergies, past medical history, past surgical history, family history, social history reviewed and updated    Objective:     Vitals: /78 (BP Location: Left arm, Patient Position: Sitting, BP Cuff Size: Large adult)   Pulse 76   Temp 36.2 °C (97.2 °F) (Temporal)   Resp (!) 98   Ht 1.778 m (5' 10\")   Wt (!) 128 kg (282 lb 3 oz)   BMI 40.49 kg/m²   General: Alert, pleasant, NAD  EYES:   PERRL, EOMI, no icterus or pallor.  Conjunctivae and lids normal.   HENT:  Normocephalic.  External ears normal.   Neck supple.    Respiratory: Normal respiratory effort.    Abdomen: obese  Skin: Warm, dry,tinea versicolor rash on trunk and upper extremities.  widespread  Musculoskeletal: Gait is normal.  Moves all extremities well.    Extremities: normal range of motion all extremities.   Neurological: No tremors, sensation grossly intact, CN2-12 intact.  Psych:  Affect/mood is normal, judgement is good, memory is intact, grooming is appropriate.      Results  Laboratory Studies  Liver function was okay in May.       Assessment/Plan:     Luis Carlos was seen today for rash and medication refill.    Diagnoses and all orders for this visit:    Tinea versicolor  -     fluconazole (DIFLUCAN) 200 MG Tab; Take 1 Tablet by mouth every day for 14 days.  -     HEPATIC FUNCTION PANEL; Future    Asthma-chronic obstructive pulmonary disease overlap syndrome (HCC)  -     PULMONARY FUNCTION TESTS -Test requested: Spirometry with-out & with Bronchodilator; Future  -     albuterol 108 (90 Base) MCG/ACT Aero Soln inhalation aerosol; Inhale 2 Puffs every four hours as needed for Shortness of Breath.    Chronic UTI        Assessment & " Plan  1. Tinea versicolor.  The condition remains uncontrolled, with a notably diffuse rash. A regimen of Diflucan 200 mg daily for 2 weeks has been initiated. It is understood that this is an off-label use. If there are any problems with the medication, he should notify the office.     2. Asthma/COPD.  Given the insurance requirement for a pulmonary function test to continue albuterol use, such testing has been ordered. An 8.5 mg albuterol inhaler will be prescribed. If the test confirms COPD, the insurance should cover the albuterol.    3. Chronic UTI.  He is currently stable and under the care of a urologist. Nitrofurantoin is being administered.    Follow-up  Return in 4 weeks for reevaluation.    Return in about 4 weeks (around 12/4/2024), or if symptoms worsen or fail to improve, for follow up rash.    Please note that this dictation was created using voice recognition software. I have made every reasonable attempt to correct obvious errors, but expect that there are errors of grammar and possible content that I did not discover before finalizing note.

## 2024-11-07 ENCOUNTER — TELEPHONE (OUTPATIENT)
Dept: MEDICAL GROUP | Facility: CLINIC | Age: 49
End: 2024-11-07
Payer: COMMERCIAL

## 2024-11-07 NOTE — TELEPHONE ENCOUNTER
DOCUMENTATION OF PAR STATUS:    1. Name of Medication & Dose: Albuterol      2. Name of Prescription Coverage Company & phone #: [employer]     3. Date Prior Auth Submitted: 11/7/24    4. What information was given to obtain insurance decision? Dx code     5. Prior Auth Status? Denied    6. Patient Notified: N\A

## 2024-11-08 RX ORDER — LEVALBUTEROL TARTRATE 45 UG/1
1-2 AEROSOL, METERED ORAL EVERY 4 HOURS PRN
Qty: 1 EACH | Refills: 3 | Status: SHIPPED | OUTPATIENT
Start: 2024-11-08

## 2024-11-18 ENCOUNTER — HOSPITAL ENCOUNTER (OUTPATIENT)
Facility: MEDICAL CENTER | Age: 49
End: 2024-11-18
Attending: UROLOGY
Payer: COMMERCIAL

## 2024-11-18 PROCEDURE — 87086 URINE CULTURE/COLONY COUNT: CPT

## 2024-11-18 PROCEDURE — 87077 CULTURE AEROBIC IDENTIFY: CPT

## 2024-11-18 PROCEDURE — 87186 SC STD MICRODIL/AGAR DIL: CPT

## 2024-11-27 ENCOUNTER — OFFICE VISIT (OUTPATIENT)
Dept: URGENT CARE | Facility: PHYSICIAN GROUP | Age: 49
End: 2024-11-27
Payer: COMMERCIAL

## 2024-11-27 VITALS
HEIGHT: 70 IN | HEART RATE: 94 BPM | RESPIRATION RATE: 20 BRPM | OXYGEN SATURATION: 98 % | SYSTOLIC BLOOD PRESSURE: 138 MMHG | DIASTOLIC BLOOD PRESSURE: 76 MMHG | BODY MASS INDEX: 39.86 KG/M2 | WEIGHT: 278.4 LBS | TEMPERATURE: 97.2 F

## 2024-11-27 DIAGNOSIS — J44.1 COPD EXACERBATION (HCC): ICD-10-CM

## 2024-11-27 PROCEDURE — 3078F DIAST BP <80 MM HG: CPT | Performed by: FAMILY MEDICINE

## 2024-11-27 PROCEDURE — 99214 OFFICE O/P EST MOD 30 MIN: CPT | Performed by: FAMILY MEDICINE

## 2024-11-27 PROCEDURE — 3075F SYST BP GE 130 - 139MM HG: CPT | Performed by: FAMILY MEDICINE

## 2024-11-27 RX ORDER — CEPHALEXIN 500 MG/1
CAPSULE ORAL
COMMUNITY

## 2024-11-27 RX ORDER — DOXYCYCLINE HYCLATE 100 MG
100 TABLET ORAL 2 TIMES DAILY
Qty: 14 TABLET | Refills: 0 | Status: SHIPPED | OUTPATIENT
Start: 2024-11-27 | End: 2024-12-04

## 2024-11-27 RX ORDER — METHYLPREDNISOLONE 4 MG/1
TABLET ORAL
Qty: 21 TABLET | Refills: 0 | Status: SHIPPED | OUTPATIENT
Start: 2024-11-27

## 2024-11-27 RX ORDER — METHOCARBAMOL 750 MG/1
1 TABLET, FILM COATED ORAL 3 TIMES DAILY PRN
COMMUNITY

## 2024-11-27 RX ORDER — BENZONATATE 200 MG/1
200 CAPSULE ORAL 3 TIMES DAILY PRN
Qty: 45 CAPSULE | Refills: 0 | Status: SHIPPED | OUTPATIENT
Start: 2024-11-27

## 2024-11-27 ASSESSMENT — FIBROSIS 4 INDEX: FIB4 SCORE: 0.74

## 2024-11-27 NOTE — PROGRESS NOTES
"     Cc:   cough    Cough  This is a new problem. The current episode started 1 week ago. The problem has been gradually worsening. The problem occurs constantly. The cough is productive of sputum. Associated symptoms include: low grade fever, wheezing. Pertinent negatives include no   headaches, sweats, weight loss. Nothing aggravates the symptoms.  Patient has tried albuterol, atrovent for the symptoms - minor improvement.   Has hx of COPD         Social History     Tobacco Use    Smoking status: Never    Smokeless tobacco: Never   Vaping Use    Vaping status: Never Used   Substance Use Topics    Alcohol use: Not Currently    Drug use: Not Currently           No past medical history on file.      No family history on file.        Review of Systems   Constitutional: Negative for fever and weight loss.   HENT: negative for ear pain.    Cardiovascular - denies chest pain or dyspnea  Respiratory: Positive for cough.  Cough is productive.  Negative for wheezing.    Neurological: Negative for headaches.   GI - denies nausea, vomiting or diarrhea  Neuro - denies numbness or tingling.            Objective:     /76   Pulse 94   Temp 36.2 °C (97.2 °F) (Temporal)   Resp 20   Ht 1.778 m (5' 10\")   Wt (!) 126 kg (278 lb 6.4 oz)   SpO2 98%       Physical Exam   Constitutional: patient is oriented to person, place, and time. Patient appears well-developed and well-nourished. No distress.   HENT:   Head: Normocephalic and atraumatic.   Right Ear: External ear normal.   Left Ear: External ear normal.   Nose: Mucosal edema and rhinorrhea present. Right sinus exhibits no maxillary sinus tenderness. Left sinus exhibits no maxillary sinus tenderness.   Mouth/Throat: Mucous membranes are normal. No oral lesions. Posterior oropharyngeal erythema present. No oropharyngeal exudate or posterior oropharyngeal edema.   Eyes: Conjunctivae and EOM are normal. Pupils are equal, round, and reactive to light. Right eye exhibits no " discharge. Left eye exhibits no discharge. No scleral icterus.   Neck: Normal range of motion. Neck supple. No tracheal deviation present.   Cardiovascular: Normal rate, regular rhythm and normal heart sounds.  Exam reveals no friction rub.    Pulmonary/Chest: Effort normal. No respiratory distress.  Patient has rhonchi and wheezing. Patient has no rales.    Musculoskeletal:  exhibits no edema.    .   Neurological: patient is alert and oriented to person, place, and time.   Skin: Skin is warm and dry. No rash noted. No erythema.   Psychiatric: patient  has a normal mood and affect.  behavior is normal.   Nursing note and vitals reviewed.              Assessment/Plan:          1. COPD exacerbation (HCC)     - doxycycline (VIBRAMYCIN) 100 MG Tab; Take 1 Tablet by mouth 2 times a day for 7 days.  Dispense: 14 Tablet; Refill: 0  - methylPREDNISolone (MEDROL DOSEPAK) 4 MG Tablet Therapy Pack; Follow schedule on package instructions.  Dispense: 21 Tablet; Refill: 0  - benzonatate (TESSALON) 200 MG capsule; Take 1 Capsule by mouth 3 times a day as needed for Cough.  Dispense: 45 Capsule; Refill: 0    Differential diagnosis, natural history, supportive care, and indications for immediate follow-up discussed. All questions answered. Patient agrees with the plan of care.     Follow-up as needed if symptoms worsen or fail to improve to PCP, Urgent care or Emergency Room.     I have personally reviewed prior external notes and test results pertinent to today's visit.  I have independently reviewed and interpreted all diagnostics ordered during this urgent care acute visit.          Supportive care, differential diagnoses, and indications for immediate follow-up discussed with patient.   Pathogenesis of diagnosis discussed including typical length and natural progression.   Instructed to return to clinic or nearest emergency department for any change in condition, further concerns, or worsening of symptoms.  Patient states  understanding of the plan of care and discharge instructions.

## 2024-12-04 ENCOUNTER — OFFICE VISIT (OUTPATIENT)
Dept: MEDICAL GROUP | Facility: CLINIC | Age: 49
End: 2024-12-04
Payer: COMMERCIAL

## 2024-12-04 VITALS
DIASTOLIC BLOOD PRESSURE: 62 MMHG | SYSTOLIC BLOOD PRESSURE: 102 MMHG | BODY MASS INDEX: 39.77 KG/M2 | RESPIRATION RATE: 18 BRPM | HEART RATE: 76 BPM | OXYGEN SATURATION: 96 % | HEIGHT: 70 IN | WEIGHT: 277.78 LBS | TEMPERATURE: 97.6 F

## 2024-12-04 DIAGNOSIS — J44.89 ASTHMA-CHRONIC OBSTRUCTIVE PULMONARY DISEASE OVERLAP SYNDROME (HCC): ICD-10-CM

## 2024-12-04 DIAGNOSIS — B36.0 TINEA VERSICOLOR: ICD-10-CM

## 2024-12-04 PROCEDURE — 3074F SYST BP LT 130 MM HG: CPT | Performed by: PHYSICIAN ASSISTANT

## 2024-12-04 PROCEDURE — 99213 OFFICE O/P EST LOW 20 MIN: CPT | Performed by: PHYSICIAN ASSISTANT

## 2024-12-04 PROCEDURE — 3078F DIAST BP <80 MM HG: CPT | Performed by: PHYSICIAN ASSISTANT

## 2024-12-04 RX ORDER — TIOTROPIUM BROMIDE 18 UG/1
18 CAPSULE ORAL; RESPIRATORY (INHALATION) DAILY
Qty: 30 CAPSULE | Refills: 3 | Status: SHIPPED | OUTPATIENT
Start: 2024-12-04

## 2024-12-04 ASSESSMENT — FIBROSIS 4 INDEX: FIB4 SCORE: 0.74

## 2024-12-04 NOTE — PROGRESS NOTES
cc:  tinea versicolor    Subjective:     Luis Carlos Singh is a 49 y.o. male presenting for tinea versicolor        History of Present Illness  The patient is a 49-year-old male who presents to the office today for a follow-up regarding tinea versicolor.     He reports that his rash is improving, with only a few small spots remaining. He believes it is on the verge of disappearing.    He also has COPD and has been using Xopenex frequently. He recalls having a chest cold before Thanksgiving, which he has not fully recovered from. He has been using levalbuterol more frequently than albuterol. He was prescribed doxycycline for phlegm and prednisone for an infection, both of which have been beneficial. His condition has improved since his visit to urgent care, but his cough has worsened since his last visit here. He used albuterol approximately 2 hours ago.     He lives in a two-story house and finds himself exhausted after climbing the stairs three times. He has previously used Advair and Spiriva, but did not find them particularly helpful. He has an appointment with a pulmonologist on 12/19/2024 to determine if his symptoms are more indicative of COPD or asthma. He uses rescue inhalers when he experiences shortness of breath or severe wheezing, which usually alleviates his symptoms within a few minutes.       Review of systems:  See above.   Denies any symptoms unless previously indicated.        Current Outpatient Medications:     tiotropium (SPIRIVA HANDIHALER) 18 MCG Cap, Place 1 Capsule into inhaler and inhale every day., Disp: 30 Capsule, Rfl: 3    methocarbamol (ROBAXIN) 750 MG Tab, Take 1 Tablet by mouth 3 times a day as needed., Disp: , Rfl:     benzonatate (TESSALON) 200 MG capsule, Take 1 Capsule by mouth 3 times a day as needed for Cough., Disp: 45 Capsule, Rfl: 0    levalbuterol (XOPENEX HFA) 45 MCG/ACT inhaler, Inhale 1-2 Puffs every four hours as needed for Shortness of Breath., Disp: 1 Each, Rfl:  "3    albuterol 108 (90 Base) MCG/ACT Aero Soln inhalation aerosol, Inhale 2 Puffs every four hours as needed for Shortness of Breath., Disp: 8.5 g, Rfl: 2    testosterone cypionate (DEPO-TESTOSTERONE) 200 MG/ML injection, 140 mg every Wednesday. 0.7 ml = 140 mg, Disp: , Rfl:     NEEDLE, DISP, 18 G 18G X 1-1/2\" Misc, Use one needle weekly for testosterone, Disp: 12 Each, Rfl: 3    nitrofurantoin (MACRODANTIN) 50 MG Cap, Take 1 capsule every day by oral route as directed for 90 days, for UTI prevention. (Patient not taking: Reported on 12/4/2024), Disp: , Rfl:     tamsulosin (FLOMAX) 0.4 MG capsule, Take 0.4 mg by mouth every day. (Patient not taking: Reported on 11/6/2024), Disp: , Rfl:     Allergies, past medical history, past surgical history, family history, social history reviewed and updated    Objective:     Vitals: /62 (BP Location: Left arm, Patient Position: Sitting, BP Cuff Size: Large adult)   Pulse 76   Temp 36.4 °C (97.6 °F) (Temporal)   Resp 18   Ht 1.778 m (5' 10\")   Wt (!) 126 kg (277 lb 12.5 oz)   SpO2 96%   BMI 39.86 kg/m²   General: Alert, pleasant, NAD  EYES:   PERRL, EOMI, no icterus or pallor.  Conjunctivae and lids normal.   HENT:  Normocephalic.  External ears normal.  Neck supple.   Heart: Regular rate and rhythm.  S1 and S2 normal.  No murmurs appreciated.  Respiratory: Normal respiratory effort.  Clear to auscultation bilaterally.  Decreased breath sounds all lung fields  Abdomen: obese  Skin: Warm, dry, no rashes.  Musculoskeletal: Gait is normal.  Moves all extremities well.    Extremities: normal range of motion all extremities.   Neurological: No tremors, sensation grossly intact,  CN2-12 intact.  Psych:  Affect/mood is normal, judgement is good, memory is intact, grooming is appropriate.      Assessment/Plan:     Luis Carlos was seen today for rash.    Diagnoses and all orders for this visit:    Tinea versicolor    Asthma-chronic obstructive pulmonary disease overlap syndrome " (HCC)  -     tiotropium (SPIRIVA HANDIHALER) 18 MCG Cap; Place 1 Capsule into inhaler and inhale every day.        Assessment & Plan  1. Tinea Versicolor.  The rash has significantly improved with very few spots remaining. No new treatment is necessary at this time. If the rash returns in about 3 months, treatment will be administered again.    2. Chronic Obstructive Pulmonary Disease (COPD)-Asthma.  He has been experiencing increased coughing and shortness of breath. A prescription for Spiriva has been provided, to be taken once daily for a duration of 30 days. He is advised to continue using Xopenex as needed. If the pulmonologist advises against Spiriva, he should follow their recommendations. He has a pulmonary appointment on 12/19/2024 to further evaluate his condition and determine if it is more related to COPD or asthma.    Follow-up  Return in 8 weeks for follow up.    Return in about 8 weeks (around 1/29/2025).    Please note that this dictation was created using voice recognition software. I have made every reasonable attempt to correct obvious errors, but expect that there are errors of grammar and possible content that I did not discover before finalizing note.

## 2024-12-14 ENCOUNTER — HOSPITAL ENCOUNTER (OUTPATIENT)
Facility: MEDICAL CENTER | Age: 49
End: 2024-12-14
Attending: STUDENT IN AN ORGANIZED HEALTH CARE EDUCATION/TRAINING PROGRAM
Payer: COMMERCIAL

## 2024-12-14 ENCOUNTER — OFFICE VISIT (OUTPATIENT)
Dept: URGENT CARE | Facility: PHYSICIAN GROUP | Age: 49
End: 2024-12-14
Payer: COMMERCIAL

## 2024-12-14 VITALS
DIASTOLIC BLOOD PRESSURE: 70 MMHG | RESPIRATION RATE: 20 BRPM | TEMPERATURE: 97.8 F | WEIGHT: 279 LBS | OXYGEN SATURATION: 96 % | SYSTOLIC BLOOD PRESSURE: 126 MMHG | HEIGHT: 70 IN | BODY MASS INDEX: 39.94 KG/M2 | HEART RATE: 94 BPM

## 2024-12-14 DIAGNOSIS — N39.0 CHRONIC UTI: ICD-10-CM

## 2024-12-14 DIAGNOSIS — R33.9 URINARY RETENTION: ICD-10-CM

## 2024-12-14 DIAGNOSIS — R39.9 LOWER URINARY TRACT SYMPTOMS (LUTS): ICD-10-CM

## 2024-12-14 PROCEDURE — 3078F DIAST BP <80 MM HG: CPT | Performed by: STUDENT IN AN ORGANIZED HEALTH CARE EDUCATION/TRAINING PROGRAM

## 2024-12-14 PROCEDURE — 87086 URINE CULTURE/COLONY COUNT: CPT

## 2024-12-14 PROCEDURE — 3074F SYST BP LT 130 MM HG: CPT | Performed by: STUDENT IN AN ORGANIZED HEALTH CARE EDUCATION/TRAINING PROGRAM

## 2024-12-14 PROCEDURE — 99214 OFFICE O/P EST MOD 30 MIN: CPT | Performed by: STUDENT IN AN ORGANIZED HEALTH CARE EDUCATION/TRAINING PROGRAM

## 2024-12-14 RX ORDER — CEFDINIR 300 MG/1
300 CAPSULE ORAL 2 TIMES DAILY
Qty: 20 CAPSULE | Refills: 0 | Status: SHIPPED | OUTPATIENT
Start: 2024-12-14 | End: 2024-12-24

## 2024-12-14 ASSESSMENT — FIBROSIS 4 INDEX: FIB4 SCORE: 0.74

## 2024-12-15 NOTE — PROGRESS NOTES
Subjective:   CHIEF COMPLAINT  Chief Complaint   Patient presents with    Low Back Pain     Kidney area is painful, x Thursday     Urinary Frequency     Since Thursday        HPI    History of Present Illness  Luis Carlos Singh is a 49 y.o. male who presents or evaluation of a suspected urinary tract infection (UTI).    Patient complains of bilateral flank pain and malodorous urine which developed over the last couple of days.  States this is typically how he presents when developing bladder infections.  Most recent infection was approximately 3 weeks ago and treated by his urologist.  He is not experiencing any dysuria.  States states he typically does not experience dysuria with UTIs.  No gross hematuria.  No fevers, vomiting or systemic symptoms.    He has a history of 9 UTIs since March 2024, characterized by back and kidney pain and malodorous urine. He is under the care of [facility] and is scheduled for a surgical procedure in January 2025. He retains approximately 200 mL of urine due to an anatomical variation.  He does not experience dysuria but reports urinary frequency, necessitating urination every 20 minutes since Thursday.     He has undergone 3 back surgeries, resulting in nerve damage and loss of sensation. He describes the pain as more pronounced on one side, with discomfort on the other.     MEDICATIONS  Current: tamsulosin, nitrofurantoin  Past: cephalexin, ciprofloxacin        REVIEW OF SYSTEMS  General: no fever or chills  GI: no nausea or vomiting  See HPI for further details.    PAST MEDICAL HISTORY  Patient Active Problem List    Diagnosis Date Noted    Urinary retention 05/14/2024    SIRS (systemic inflammatory response syndrome) (HCC) 05/13/2024    Chronic UTI 05/13/2024    Hyperkalemia 03/05/2024    Lumbar radiculopathy, acute 03/04/2024    Cauda equina syndrome (HCC) 03/04/2024    Neuropathy 03/04/2024    Class 2 obesity in adult 03/04/2024    Mixed hyperlipidemia 08/16/2023     "Hyperglycemia 08/16/2023    Prediabetes 08/16/2023    Vitamin D deficiency 08/16/2023    Tinea versicolor 07/17/2023    Asthma-chronic obstructive pulmonary disease overlap syndrome (HCC) 04/15/2021    Hypogonadotropic hypogonadism (HCC) 03/05/2020    Low testosterone 06/06/2018    Spinal stenosis of lumbar region 06/20/2016    Degeneration of intervertebral disc of lumbosacral region 05/27/2016    Adjustment disorder 05/17/2016    Chronic low back pain 03/04/2016    Low back pain 03/04/2016    Facial nerve palsy 01/07/2016       SURGICAL HISTORY   has a past surgical history that includes lumbar fusion anterior; carpal tunnel release (Left); lumbar fusion o-arm (Left, 3/5/2024); and lumbar laminectomy diskectomy (Bilateral, 3/5/2024).    ALLERGIES  No Known Allergies    CURRENT MEDICATIONS  Home Medications       Reviewed by Shayne Quiros D.O. (Physician) on 12/14/24 at 1609  Med List Status: <None>     Medication Last Dose Status   albuterol 108 (90 Base) MCG/ACT Aero Soln inhalation aerosol PRN Active   benzonatate (TESSALON) 200 MG capsule  Active   levalbuterol (XOPENEX HFA) 45 MCG/ACT inhaler PRN Active   methocarbamol (ROBAXIN) 750 MG Tab Not Taking Active   NEEDLE, DISP, 18 G 18G X 1-1/2\" Misc Taking Active   nitrofurantoin (MACRODANTIN) 50 MG Cap Not Taking Active   tamsulosin (FLOMAX) 0.4 MG capsule  Active   testosterone cypionate (DEPO-TESTOSTERONE) 200 MG/ML injection Taking Active   tiotropium (SPIRIVA HANDIHALER) 18 MCG Cap PRN Active                    SOCIAL HISTORY  Social History     Tobacco Use    Smoking status: Never    Smokeless tobacco: Never   Vaping Use    Vaping status: Never Used   Substance and Sexual Activity    Alcohol use: Not Currently    Drug use: Not Currently    Sexual activity: Not on file       FAMILY HISTORY  History reviewed. No pertinent family history.       Objective:   PHYSICAL EXAM  VITAL SIGNS: /70   Pulse 94   Temp 36.6 °C (97.8 °F) (Temporal)   Resp 20   " "Ht 1.778 m (5' 10\")   Wt (!) 127 kg (279 lb)   SpO2 96%   BMI 40.03 kg/m²     Gen: no acute distress, normal voice  Skin: dry, intact, moist mucosal membranes  Eyes: No conjunctival injection b/l  Neck: Normal range of motion. No meningeal signs.   Lungs: No increased work of breathing.  CTAB w/ symmetric expansion  CV: RRR w/o murmurs or clicks  : Mild CVAT bilaterally  Psych: normal affect, normal judgement, alert, awake    Assessment/Plan:     1. Lower urinary tract symptoms (LUTS)  POCT Urinalysis    URINE CULTURE(NEW)    cefdinir (OMNICEF) 300 MG Cap      History of recurrent UTIs in the absence of dysuria and unremarkable UA.  Follows with urology and has plans for surgical intervention next month.  UA is currently unremarkable.  Patient is afebrile without any current systemic symptoms.  -Ordered urine culture  -Ordered cefdinir.  I encouraged the patient to refrain from beginning the antibiotics until the results of the urine culture has returned.  However if develop any dysuria or worsening symptoms at that time it would be recommended to begin the antibiotics.  -2 L H2O daily  -Return to urgent care any new/worsening symptoms or further questions or concerns.  Patient understood everything discussed.  All questions were answered.      Chronic condition with acute exacerbation, ordered medication    Please note that this dictation was created using voice recognition software. I have made a reasonable attempt to correct obvious errors, but I expect that there are errors of grammar and possibly content that I did not discover before finalizing the note.         "

## 2024-12-16 DIAGNOSIS — R39.9 LOWER URINARY TRACT SYMPTOMS (LUTS): ICD-10-CM

## 2024-12-18 LAB
BACTERIA UR CULT: NORMAL
SIGNIFICANT IND 70042: NORMAL
SITE SITE: NORMAL
SOURCE SOURCE: NORMAL

## 2024-12-19 ENCOUNTER — HOSPITAL ENCOUNTER (OUTPATIENT)
Dept: PULMONOLOGY | Facility: MEDICAL CENTER | Age: 49
End: 2024-12-19
Attending: PHYSICIAN ASSISTANT
Payer: COMMERCIAL

## 2024-12-19 DIAGNOSIS — J44.89 ASTHMA-CHRONIC OBSTRUCTIVE PULMONARY DISEASE OVERLAP SYNDROME (HCC): ICD-10-CM

## 2024-12-19 PROCEDURE — 94060 EVALUATION OF WHEEZING: CPT | Mod: 26 | Performed by: INTERNAL MEDICINE

## 2024-12-19 PROCEDURE — 94060 EVALUATION OF WHEEZING: CPT

## 2024-12-19 RX ORDER — ALBUTEROL SULFATE 5 MG/ML
2.5 SOLUTION RESPIRATORY (INHALATION)
Status: DISCONTINUED | OUTPATIENT
Start: 2024-12-19 | End: 2024-12-20 | Stop reason: HOSPADM

## 2024-12-19 RX ADMIN — ALBUTEROL SULFATE 2.5 MG: 5 SOLUTION RESPIRATORY (INHALATION) at 08:00

## 2024-12-22 NOTE — PROCEDURES
DATE OF SERVICE:  12/19/2024     PULMONARY FUNCTION TEST INTERPRETATION     IMPRESSION:   1.  There is mild obstruction.  2.  There is a significant bronchodilator response.   3.  Pre-bronchodilator FEV1 is 2.4 liters/70%, post-bronchodilator FEV1 is   3.32 liters/85%.     These PFTs are consistent with a diagnosis of COPD/asthma overlap syndrome.        ______________________________  DO JACQUELYN Villalobos/YOBANY    DD:  12/21/2024 15:22  DT:  12/21/2024 17:09    Job#:  [id number]

## 2024-12-25 DIAGNOSIS — J44.89 ASTHMA-CHRONIC OBSTRUCTIVE PULMONARY DISEASE OVERLAP SYNDROME (HCC): ICD-10-CM

## 2024-12-26 RX ORDER — ALBUTEROL SULFATE 90 UG/1
2 INHALANT RESPIRATORY (INHALATION) EVERY 4 HOURS PRN
Qty: 54 G | Refills: 1 | Status: SHIPPED | OUTPATIENT
Start: 2024-12-26

## 2024-12-26 NOTE — TELEPHONE ENCOUNTER
Received request via: Patient    Was the patient seen in the last year in this department? Yes    Does the patient have an active prescription (recently filled or refills available) for medication(s) requested? yes    Pharmacy Name: [facility] in [place]    Does the patient have group home Plus and need 100-day supply? (This applies to ALL medications) Patient does not have SCP

## 2025-01-08 ENCOUNTER — HOSPITAL ENCOUNTER (OUTPATIENT)
Facility: MEDICAL CENTER | Age: 50
End: 2025-01-08
Payer: COMMERCIAL

## 2025-01-08 PROCEDURE — 87186 SC STD MICRODIL/AGAR DIL: CPT

## 2025-01-08 PROCEDURE — 87086 URINE CULTURE/COLONY COUNT: CPT

## 2025-01-08 PROCEDURE — 87077 CULTURE AEROBIC IDENTIFY: CPT

## 2025-01-10 DIAGNOSIS — J44.89 ASTHMA-CHRONIC OBSTRUCTIVE PULMONARY DISEASE OVERLAP SYNDROME (HCC): ICD-10-CM

## 2025-01-10 NOTE — TELEPHONE ENCOUNTER
Requested Prescriptions     Pending Prescriptions Disp Refills    levalbuterol (XOPENEX HFA) 45 MCG/ACT inhaler [Pharmacy Med Name: LEVALBUTEROL HFA INH (200PF) 15GM] 15 g      Sig: INHALE 1 TO 2 PUFFS BY MOUTH EVERY 4 HOURS AS NEEDED FOR SHORTNESS OF BREATH     Last office visit: 12/4/24  Last lab: 7/15/24

## 2025-01-14 RX ORDER — LEVALBUTEROL TARTRATE 45 UG/1
1-2 AEROSOL, METERED ORAL EVERY 4 HOURS PRN
Qty: 15 G | Refills: 4 | Status: SHIPPED | OUTPATIENT
Start: 2025-01-14

## 2025-01-28 DIAGNOSIS — J44.89 ASTHMA-CHRONIC OBSTRUCTIVE PULMONARY DISEASE OVERLAP SYNDROME (HCC): ICD-10-CM

## 2025-01-28 RX ORDER — ALBUTEROL SULFATE 90 UG/1
2 INHALANT RESPIRATORY (INHALATION) EVERY 4 HOURS PRN
Qty: 18 G | Refills: 2 | Status: SHIPPED | OUTPATIENT
Start: 2025-01-28

## 2025-01-28 NOTE — TELEPHONE ENCOUNTER
Received request via: Patient    Was the patient seen in the last year in this department? Yes    Does the patient have an active prescription (recently filled or refills available) for medication(s) requested?  yes    Pharmacy Name: [facility] in [place]    Does the patient have snf Plus and need 100-day supply? (This applies to ALL medications) Patient does not have SCP

## 2025-03-10 DIAGNOSIS — J44.89 ASTHMA-CHRONIC OBSTRUCTIVE PULMONARY DISEASE OVERLAP SYNDROME (HCC): ICD-10-CM

## 2025-03-10 NOTE — TELEPHONE ENCOUNTER
Received request via: Patient    Was the patient seen in the last year in this department? Yes    Does the patient have an active prescription (recently filled or refills available) for medication(s) requested?  yes    Pharmacy Name: [facility] in [place]    Does the patient have [facility]PC Plus and need 100-day supply? (This applies to ALL medications) Patient does not have SCP    Last Office Visit: 12/4/24  Last Labs: 7/15/24

## 2025-03-11 RX ORDER — TIOTROPIUM BROMIDE 18 UG/1
18 CAPSULE ORAL; RESPIRATORY (INHALATION) DAILY
Qty: 30 CAPSULE | Refills: 3 | Status: SHIPPED | OUTPATIENT
Start: 2025-03-11 | End: 2025-03-20 | Stop reason: SDUPTHER

## 2025-03-20 DIAGNOSIS — J44.89 ASTHMA-CHRONIC OBSTRUCTIVE PULMONARY DISEASE OVERLAP SYNDROME (HCC): ICD-10-CM

## 2025-03-20 RX ORDER — TIOTROPIUM BROMIDE 18 UG/1
18 CAPSULE ORAL; RESPIRATORY (INHALATION) DAILY
Qty: 90 CAPSULE | Refills: 3 | Status: SHIPPED | OUTPATIENT
Start: 2025-03-20

## 2025-03-23 ENCOUNTER — OFFICE VISIT (OUTPATIENT)
Dept: URGENT CARE | Facility: PHYSICIAN GROUP | Age: 50
End: 2025-03-23
Payer: COMMERCIAL

## 2025-03-23 VITALS
WEIGHT: 280.6 LBS | TEMPERATURE: 97.6 F | HEIGHT: 70 IN | HEART RATE: 100 BPM | DIASTOLIC BLOOD PRESSURE: 62 MMHG | BODY MASS INDEX: 40.17 KG/M2 | SYSTOLIC BLOOD PRESSURE: 118 MMHG | OXYGEN SATURATION: 95 % | RESPIRATION RATE: 20 BRPM

## 2025-03-23 DIAGNOSIS — H66.002 NON-RECURRENT ACUTE SUPPURATIVE OTITIS MEDIA OF LEFT EAR WITHOUT SPONTANEOUS RUPTURE OF TYMPANIC MEMBRANE: ICD-10-CM

## 2025-03-23 PROCEDURE — 3074F SYST BP LT 130 MM HG: CPT

## 2025-03-23 PROCEDURE — 3078F DIAST BP <80 MM HG: CPT

## 2025-03-23 PROCEDURE — 99213 OFFICE O/P EST LOW 20 MIN: CPT

## 2025-03-23 ASSESSMENT — ENCOUNTER SYMPTOMS
HEADACHES: 0
DIARRHEA: 0
COUGH: 0
WHEEZING: 0
VOMITING: 0
CHILLS: 0
SORE THROAT: 1
FEVER: 0
WEAKNESS: 0
DIZZINESS: 0
ABDOMINAL PAIN: 0
NAUSEA: 0
SHORTNESS OF BREATH: 0
PALPITATIONS: 0

## 2025-03-23 ASSESSMENT — FIBROSIS 4 INDEX: FIB4 SCORE: 0.74

## 2025-03-23 ASSESSMENT — PAIN SCALES - GENERAL: PAINLEVEL_OUTOF10: 4=SLIGHT-MODERATE PAIN

## 2025-03-23 NOTE — PROGRESS NOTES
"Subjective     Luis Carlos Singh is a 49 y.o. male who presents with Cerumen Impaction (Per patient: 1.5 weeks, left ear, fullness, and pain, sinus congestion at night. )            Otalgia   There is pain in the left ear. This is a new problem. The current episode started 1 to 4 weeks ago. The problem occurs constantly. The problem has been gradually worsening. There has been no fever. The pain is mild. Associated symptoms include hearing loss and a sore throat. Pertinent negatives include no abdominal pain, coughing, diarrhea, ear discharge, headaches or vomiting. Treatments tried: hydrogen peroxide. The treatment provided no relief. There is no history of a chronic ear infection or hearing loss.       Review of Systems   Constitutional:  Negative for chills and fever.   HENT:  Positive for ear pain, hearing loss and sore throat. Negative for ear discharge.    Respiratory:  Negative for cough, shortness of breath and wheezing.    Cardiovascular:  Negative for chest pain and palpitations.   Gastrointestinal:  Negative for abdominal pain, diarrhea, nausea and vomiting.   Neurological:  Negative for dizziness, weakness and headaches.              Objective     /62 (BP Location: Right arm, Patient Position: Sitting, BP Cuff Size: Adult)   Pulse 100   Temp 36.4 °C (97.6 °F) (Temporal)   Resp 20   Ht 1.778 m (5' 10\")   Wt (!) 127 kg (280 lb 9.6 oz)   SpO2 95%   BMI 40.26 kg/m²      Physical Exam  Constitutional:       General: He is not in acute distress.     Appearance: Normal appearance. He is normal weight. He is not ill-appearing.   HENT:      Head: Normocephalic and atraumatic.      Right Ear: Hearing, tympanic membrane, ear canal and external ear normal.      Left Ear: Decreased hearing noted. Tympanic membrane is erythematous and bulging. Tympanic membrane is not perforated.      Nose: Congestion and rhinorrhea present.      Mouth/Throat:      Mouth: Mucous membranes are moist.      Pharynx: " Oropharynx is clear. Uvula midline. No pharyngeal swelling, oropharyngeal exudate or posterior oropharyngeal erythema.      Tonsils: No tonsillar exudate or tonsillar abscesses. 1+ on the right. 1+ on the left.   Eyes:      Extraocular Movements: Extraocular movements intact.      Pupils: Pupils are equal, round, and reactive to light.   Cardiovascular:      Rate and Rhythm: Normal rate and regular rhythm.   Pulmonary:      Effort: Pulmonary effort is normal. No respiratory distress.      Breath sounds: Normal breath sounds. No stridor. No wheezing or rhonchi.   Musculoskeletal:      Cervical back: Normal range of motion and neck supple. No rigidity.   Lymphadenopathy:      Cervical: No cervical adenopathy.   Skin:     General: Skin is warm and dry.   Neurological:      General: No focal deficit present.      Mental Status: He is alert and oriented to person, place, and time. Mental status is at baseline.                       Assessment & Plan  Non-recurrent acute suppurative otitis media of left ear without spontaneous rupture of tympanic membrane    Orders:    amoxicillin-clavulanate (AUGMENTIN) 875-125 MG Tab; Take 1 Tablet by mouth 2 times a day for 7 days.       This is an acute condition.  Previous visits, pmhx, medication, and VS reviewed.  Patient not acutely ill appearing or in acute distress. VSS.     Lung sounds clear, no wheezing or rhonchi.  No c/f sinusitis or pharyngitis at this time.  Provided patient with information on the etiology & pathogenesis of otitis media. Instructed to take antibiotics as prescribed.   Take antibiotic with food and probiotic.  May give Tylenol/Motrin prn discomfort.  May apply warm compress to the ear for prn discomfort.   Increase fluids.  Refrain from earbud or headphone use until symptoms resolve.   OTC earwax removal drops prn   Follow-up in clinic in 4 days if symptoms or not improving or sooner if symptoms acutely worsen.  Informed patient of more severe symptoms  that warrant ED workup.     Patient understands and is agreeable to treatment plan.  Denies further questions.

## 2025-03-30 ENCOUNTER — OFFICE VISIT (OUTPATIENT)
Dept: URGENT CARE | Facility: PHYSICIAN GROUP | Age: 50
End: 2025-03-30
Payer: COMMERCIAL

## 2025-03-30 VITALS
BODY MASS INDEX: 39.65 KG/M2 | TEMPERATURE: 96.5 F | RESPIRATION RATE: 12 BRPM | WEIGHT: 277 LBS | OXYGEN SATURATION: 97 % | SYSTOLIC BLOOD PRESSURE: 118 MMHG | DIASTOLIC BLOOD PRESSURE: 90 MMHG | HEIGHT: 70 IN | HEART RATE: 110 BPM

## 2025-03-30 DIAGNOSIS — H69.93 DYSFUNCTION OF BOTH EUSTACHIAN TUBES: ICD-10-CM

## 2025-03-30 RX ORDER — FEXOFENADINE HCL AND PSEUDOEPHEDRINE HCI 60; 120 MG/1; MG/1
1 TABLET, EXTENDED RELEASE ORAL 2 TIMES DAILY
Qty: 30 TABLET | Refills: 0 | Status: SHIPPED | OUTPATIENT
Start: 2025-03-30

## 2025-03-30 RX ORDER — FLUTICASONE PROPIONATE 50 MCG
1 SPRAY, SUSPENSION (ML) NASAL 2 TIMES DAILY
Qty: 16 G | Refills: 0 | Status: SHIPPED | OUTPATIENT
Start: 2025-03-30 | End: 2025-04-06

## 2025-03-30 ASSESSMENT — FIBROSIS 4 INDEX: FIB4 SCORE: 0.74

## 2025-03-30 NOTE — PROGRESS NOTES
"Subjective:      Chief Complaint   Patient presents with    Otalgia     Left ear, some right ear pain X 7 days                 Bilateral ear pain  This is a new problem. The current episode started 3 wk ago.    He was seen for left otitis media and completed one wk Augmentin - no improvement          States ears feel congested      Social History     Tobacco Use    Smoking status: Never    Smokeless tobacco: Never   Vaping Use    Vaping status: Never Used   Substance Use Topics    Alcohol use: Not Currently    Drug use: Not Currently         No past medical history on file.      Current Outpatient Medications on File Prior to Visit   Medication Sig Dispense Refill    tiotropium (SPIRIVA HANDIHALER) 18 MCG Cap Place 1 Capsule into inhaler and inhale every day. 90 Capsule 3    albuterol 108 (90 Base) MCG/ACT Aero Soln inhalation aerosol INHALE 2 PUFFS BY MOUTH EVERY 4 HOURS AS NEEDED FOR SHORTNESS OF BREATH 18 g 2    levalbuterol (XOPENEX HFA) 45 MCG/ACT inhaler INHALE 1 TO 2 PUFFS BY MOUTH EVERY 4 HOURS AS NEEDED FOR SHORTNESS OF BREATH 15 g 4    methocarbamol (ROBAXIN) 750 MG Tab Take 1 Tablet by mouth 3 times a day as needed.      testosterone cypionate (DEPO-TESTOSTERONE) 200 MG/ML injection 140 mg every Wednesday. 0.7 ml = 140 mg      NEEDLE, DISP, 18 G 18G X 1-1/2\" Misc Use one needle weekly for testosterone 12 Each 3    amoxicillin-clavulanate (AUGMENTIN) 875-125 MG Tab Take 1 Tablet by mouth 2 times a day for 7 days. (Patient not taking: Reported on 3/30/2025) 14 Tablet 0     No current facility-administered medications on file prior to visit.           Review of Systems   Constitutional: Negative for fever and chills.   HENT: Positive for congestion and ear pain. Negative for hearing loss and tinnitus.    Respiratory:   Negative for hemoptysis, shortness of breath and wheezing.    Cardiovascular: Negative for chest pain, palpitations and leg swelling.   Gastrointestinal: Negative for nausea and abdominal " "pain.   Musculoskeletal: Negative for myalgias, joint swelling and neck pain.   Skin: Negative for rash.   Neurological: Negative for headaches.   All other systems reviewed and are negative.         Objective:     BP (!) 118/90   Pulse (!) 110   Temp 35.8 °C (96.5 °F) (Temporal)   Resp 12   Ht 1.778 m (5' 10\")   Wt (!) 126 kg (277 lb)   SpO2 97%       Physical Exam   Constitutional: Vital signs are normal. Pt is active. No distress.   HENT:   Head: There is normal jaw occlusion.   Right Ear: External ear normal. Tympanic membrane is normal. No middle ear effusion.   Left Ear: External ear normal. Tympanic membrane is normal. No middle ear effusion.   Nose:   congestion present. No nasal discharge.   Mouth/Throat: Mucous membranes are moist. No oral lesions.  No oropharyngeal exudate, erythema, pharynx swelling or pharynx petechiae. Tonsils are 0 on the right. Tonsils are 0 on the left. No tonsillar exudate.   Eyes: Conjunctivae and EOM are normal. Pupils are equal, round, and reactive to light. Right eye exhibits no discharge. Left eye exhibits no discharge.   Neck: Normal range of motion. Neck supple.  No cervical LAD   Cardiovascular: Normal rate and regular rhythm.  Pulses are palpable.    No murmur heard.  Pulmonary/Chest: Effort normal and breath sounds normal. There is normal air entry. No respiratory distress. no wheezes, rhonchi,  retraction.   Musculoskeletal:   no edema.   Neurological: A/O x 3.   CN 2-12 intact   Skin: Skin is warm. Capillary refill takes less than 3 seconds. No purpura and no rash noted. Patient is not diaphoretic. No jaundice or pallor.   Nursing note and vitals reviewed.              Assessment/Plan:     1. ETD (eustachian tube dysfunction), bilateral     - fexofenadine-pseudoephedrine (ALLEGRA-D)  MG per tablet; Take 1 Tab by mouth 2 times a day.  Dispense: 30 Tab; Refill: 0  - fluticasone (FLONASE) 50 MCG/ACT nasal spray; Spray 1 Spray in nose every day.  Dispense: 1 " Bottle; Refill: 0      Differential diagnosis, natural history, supportive care, and indications for immediate follow-up discussed. All questions answered. Patient agrees with the plan of care.     Follow-up as needed if symptoms worsen or fail to improve to PCP, Urgent care or Emergency Room.     I have personally reviewed prior external notes and test results pertinent to today's visit.  I have independently reviewed and interpreted all diagnostics ordered during this urgent care acute visit.

## 2025-04-26 DIAGNOSIS — H69.93 DYSFUNCTION OF BOTH EUSTACHIAN TUBES: ICD-10-CM

## 2025-04-27 RX ORDER — FLUTICASONE PROPIONATE 50 MCG
SPRAY, SUSPENSION (ML) NASAL
Qty: 16 G | Refills: 0 | OUTPATIENT
Start: 2025-04-27

## (undated) DEVICE — PROBE SAFEOP STIMULATING STERILE

## (undated) DEVICE — LIGHT FIBER OPTIC ILLUMINATION SYSTEM STERILE

## (undated) DEVICE — GLOVE BIOGEL INDICATOR SZ 6.5 SURGICAL PF LTX - (50PR/BX 4BX/CA)

## (undated) DEVICE — ARMREST CRADLE FOAM - (2PR/PK 12PR/CA)

## (undated) DEVICE — Device

## (undated) DEVICE — GLOVE BIOGEL SZ 7.5 SURGICAL PF LTX - (50PR/BX 4BX/CA)

## (undated) DEVICE — MIDAS LUBRICATOR DIFFUSER PACK (4EA/CA)

## (undated) DEVICE — SUCTION INSTRUMENT YANKAUER BULBOUS TIP W/O VENT (50EA/CA)

## (undated) DEVICE — GLOVE BIOGEL INDICATOR SZ 8 SURGICAL PF LTX - (50/BX 4BX/CA)

## (undated) DEVICE — PIN PERCUTANEOUS STERILE 150MM

## (undated) DEVICE — SET LEADWIRE 5 LEAD BEDSIDE DISPOSABLE ECG (1SET OF 5/EA)

## (undated) DEVICE — GLOVE BIOGEL SZ 7 SURGICAL PF LTX - (50PR/BX 4BX/CA)

## (undated) DEVICE — ELECTRODE DUAL RETURN W/ CORD - (50/PK)

## (undated) DEVICE — COVER MAYO STAND X-LG - (22EA/CA)

## (undated) DEVICE — SUTURE GENERAL

## (undated) DEVICE — DRESSING POST OP BORDER 4INX6IN AG (70/CA)

## (undated) DEVICE — SUTURE 0 VICRYL PLUS CT-1 - 8 X 18 INCH (12/BX)

## (undated) DEVICE — SHIM

## (undated) DEVICE — TUBE CONNECT SUCTION CLEAR 120 X 1/4" (50EA/CA)"

## (undated) DEVICE — BLADE SURGICAL CLIPPER - (50EA/CA)

## (undated) DEVICE — SLEEVE, VASO, THIGH, MED

## (undated) DEVICE — GLOVE SZ 7.5 BIOGEL PI MICRO - PF LF (50PR/BX)

## (undated) DEVICE — SYRINGE 30 ML LL (56/BX)

## (undated) DEVICE — SPHERE NAVIGATION STEALTH (5EA/TY 12TY/PK)

## (undated) DEVICE — GOWN WARMING STANDARD FLEX - (30/CA)

## (undated) DEVICE — DRAPE LARGE 3 QUARTER - (20/CA)

## (undated) DEVICE — SYSTEM 60MM BLOOD AND BONE MARROW ASPIRATION

## (undated) DEVICE — SUCTION TUBE FRAZIER 12FR STERILE (40EA/CA)

## (undated) DEVICE — DRAPE SRG LG BCK TBL DISP - 10/CA

## (undated) DEVICE — TUBING CLEARLINK DUO-VENT - C-FLO (48EA/CA)

## (undated) DEVICE — CHLORAPREP 26 ML APPLICATOR - ORANGE TINT(25/CA)

## (undated) DEVICE — DRAIN J-VAC 7MM FLAT - (10EA/CA)

## (undated) DEVICE — SET EXTENSION WITH 2 PORTS (48EA/CA) ***PART #2C8610 IS A SUBSTITUTE*****

## (undated) DEVICE — CONTAINER SPECIMEN BAG OR - STERILE 4 OZ W/LID (100EA/CA)

## (undated) DEVICE — GOWN SURGEONS X-LARGE - DISP. (30/CA)

## (undated) DEVICE — DRAPE LAPAROTOMY T SHEET - (12EA/CA)

## (undated) DEVICE — SENSOR OXIMETER ADULT SPO2 RD SET (20EA/BX)

## (undated) DEVICE — BONE WAX (12PK/BX)

## (undated) DEVICE — TRAY CATHETER FOLEY URINE METER W/STATLOCK 350ML (10EA/CA)

## (undated) DEVICE — BOVIE BLADE COATED &INSULATED - 25/PK

## (undated) DEVICE — GLOVE BIOGEL SZ 8 SURGICAL PF LTX - (50PR/BX 4BX/CA)

## (undated) DEVICE — DRAPE SURG STERI-DRAPE 7X11OD - (40EA/CA)

## (undated) DEVICE — DRAPE MAYO STAND - (30/CA)

## (undated) DEVICE — GLOVE BIOGEL SZ 6.5 SURGICAL PF LTX (50PR/BX 4BX/CA)

## (undated) DEVICE — KIT SURGIFLO W/OUT THROMBIN - (6EA/CA)

## (undated) DEVICE — PEN SKIN MARKER W/RULER - (50EA/BX)

## (undated) DEVICE — DRAPE STRLE REG TOWEL 18X24 - (10/BX 4BX/CA)"

## (undated) DEVICE — FIBRILLAR SURGICEL 4X4 - 10/CA

## (undated) DEVICE — GLOVE BIOGEL PI ORTHO SZ 8 PF LF (40PR/BX)

## (undated) DEVICE — HEADREST PRONEVIEW LARGE - (10/CA)

## (undated) DEVICE — COVER LIGHT HANDLE ALC PLUS DISP (18EA/BX)

## (undated) DEVICE — KNIFE

## (undated) DEVICE — DRAPE 36X28IN RAD CARM BND BG - (25/CA) O

## (undated) DEVICE — TOWELS CLOTH SURGICAL - (4/PK 20PK/CA)

## (undated) DEVICE — DRESSING TRANSPARENT FILM TEGADERM 2.375 X 2.75"  (100EA/BX)"

## (undated) DEVICE — PACK NEURO - (2EA/CA)

## (undated) DEVICE — TUBING C&T SET FLYING LEADS DRAIN TUBING (10EA/BX)

## (undated) DEVICE — SUTURE 2-0 VICRYL PLUS CT-1 - 8 X 18 INCH(12/BX)

## (undated) DEVICE — CANISTER SUCTION 3000ML MECHANICAL FILTER AUTO SHUTOFF MEDI-VAC NONSTERILE LF DISP  (40EA/CA)

## (undated) DEVICE — RESERVOIR SUCTION 100 CC - SILICONE (20EA/CA)

## (undated) DEVICE — SODIUM CHL IRRIGATION 0.9% 1000ML (12EA/CA)

## (undated) DEVICE — GLOVE SZ 7 BIOGEL PI MICRO - PF LF (50PR/BX 4BX/CA)

## (undated) DEVICE — GOWN SURGEONS LARGE - (32/CA)

## (undated) DEVICE — DRESSING TRANSPARENT FILM TEGADERM 4 X 4.75" (50EA/BX)"

## (undated) DEVICE — SPONGE GAUZESTER 4 X 4 4PLY - (128PK/CA)

## (undated) DEVICE — LACTATED RINGERS INJ. 500 ML - (24EA/CA)

## (undated) DEVICE — ADHESIVE MASTISOL - (48/BX)

## (undated) DEVICE — LACTATED RINGERS INJ 1000 ML - (14EA/CA 60CA/PF)

## (undated) DEVICE — TOOL MR8 14CM MATCH HD SYM-TRI 3MM DIAMETER (1/EA)